# Patient Record
Sex: FEMALE | Race: WHITE | NOT HISPANIC OR LATINO | Employment: UNEMPLOYED | URBAN - METROPOLITAN AREA
[De-identification: names, ages, dates, MRNs, and addresses within clinical notes are randomized per-mention and may not be internally consistent; named-entity substitution may affect disease eponyms.]

---

## 2024-09-12 ENCOUNTER — CONSULT (OUTPATIENT)
Age: 24
End: 2024-09-12
Payer: MEDICARE

## 2024-09-12 VITALS
HEIGHT: 69 IN | WEIGHT: 228.8 LBS | BODY MASS INDEX: 33.89 KG/M2 | SYSTOLIC BLOOD PRESSURE: 136 MMHG | DIASTOLIC BLOOD PRESSURE: 84 MMHG

## 2024-09-12 DIAGNOSIS — N80.9 ENDOMETRIOSIS DETERMINED BY LAPAROSCOPY: Primary | ICD-10-CM

## 2024-09-12 DIAGNOSIS — Z30.09 STERILIZATION CONSULT: ICD-10-CM

## 2024-09-12 PROCEDURE — 99203 OFFICE O/P NEW LOW 30 MIN: CPT | Performed by: OBSTETRICS & GYNECOLOGY

## 2024-09-12 RX ORDER — NORTRIPTYLINE HYDROCHLORIDE 75 MG/1
75 CAPSULE ORAL DAILY
COMMUNITY
Start: 2024-07-31

## 2024-09-12 RX ORDER — ACETAMINOPHEN AND CODEINE PHOSPHATE 120; 12 MG/5ML; MG/5ML
0.35 SOLUTION ORAL DAILY
COMMUNITY
Start: 2024-06-05 | End: 2025-06-05

## 2024-09-12 RX ORDER — UBROGEPANT 100 MG/1
100 TABLET ORAL DAILY PRN
COMMUNITY

## 2024-09-12 RX ORDER — ONDANSETRON 4 MG/1
4 TABLET, ORALLY DISINTEGRATING ORAL
COMMUNITY

## 2024-09-13 PROBLEM — F41.9 ANXIETY: Status: ACTIVE | Noted: 2017-10-06

## 2024-09-13 PROBLEM — F42.9 OCD (OBSESSIVE COMPULSIVE DISORDER): Status: ACTIVE | Noted: 2022-10-04

## 2024-09-13 PROBLEM — G43.009 MIGRAINE WITHOUT AURA AND WITHOUT STATUS MIGRAINOSUS, NOT INTRACTABLE: Status: ACTIVE | Noted: 2020-10-28

## 2024-09-13 PROBLEM — G40.89 OTHER SEIZURES (HCC): Status: ACTIVE | Noted: 2020-10-28

## 2024-09-13 PROBLEM — F32.9 MAJOR DEPRESSIVE DISORDER: Status: ACTIVE | Noted: 2021-12-05

## 2024-09-13 PROBLEM — F43.10 POSTTRAUMATIC STRESS DISORDER: Status: ACTIVE | Noted: 2021-12-05

## 2024-09-13 PROBLEM — F95.2 GILLES DE LA TOURETTE'S SYNDROME: Status: ACTIVE | Noted: 2017-10-06

## 2024-09-13 PROBLEM — N80.9 ENDOMETRIOSIS DETERMINED BY LAPAROSCOPY: Status: ACTIVE | Noted: 2024-06-05

## 2024-09-13 PROBLEM — F44.5 PSYCHOGENIC NONEPILEPTIC SEIZURE: Status: ACTIVE | Noted: 2022-10-04
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
## 2024-09-17 ENCOUNTER — HOSPITAL ENCOUNTER (OUTPATIENT)
Dept: ULTRASOUND IMAGING | Facility: HOSPITAL | Age: 24
Discharge: HOME | End: 2024-09-17
Attending: OBSTETRICS & GYNECOLOGY
Payer: MEDICARE

## 2024-09-17 DIAGNOSIS — N80.9 ENDOMETRIOSIS DETERMINED BY LAPAROSCOPY: ICD-10-CM

## 2024-09-17 PROCEDURE — 76830 TRANSVAGINAL US NON-OB: CPT

## 2024-09-17 PROCEDURE — 76856 US EXAM PELVIC COMPLETE: CPT

## 2024-09-26 ENCOUNTER — TELEPHONE (OUTPATIENT)
Age: 24
End: 2024-09-26

## 2024-09-26 NOTE — TELEPHONE ENCOUNTER
----- Message from Pallavi Yun MD sent at 2024 11:42 AM EDT -----  Regarding: Surgery  [facility] GYN Department  Surgery Scheduling Sheet    Patient Name: Alexis Jules  : 2000    Provider: Pallavi Yun MD     Needed: no; Language: N/A    Procedure: exam under anesthesia, total laparoscopic hysterectomy, bilateral salpingectomy, and possible bilateral oophorectomy    Diagnosis: Endometriosis, desire for sterility, pelvic pain    Special Needs or Equipment: none    Anesthesia: General anesthesia    Length of stay: outpatient  Does patient have comorbid conditions that will require close perioperative monitoring prior to safe discharge: no    The patient has comorbid conditions that will require close perioperative monitoring prior to safe discharge, including N/A.   This may require acute care beyond the usual and routine recovery period. As such, inpatient admission post-operatively is expected and appropriate, and anticipated hospital length of stay will be >2 midnights.    Pre-Admission Testing Needed: no   Labs that should be ordered: cbc, type and screen, and urine pregnancy test    Order PAT that is recommended in prep for procedure?: Yes, need preop anesthesia consult due to h/o substance abuse disorder    Medical Clearance Needed: no; Provider: N/A    MA Form Signed (tubals/hysterectomy): Yes    Surgical Drink Given: no; returning for full preop physical    How many days out of work: 6 week(s)     How many days no drivin day(s)       Is pre op appt needed?  scheduled  Interval for post op appt: 2 week(s)     For Surgical Scheduler:     Surgery Scheduled On:  [place]:     Pre-op Appt:   Post op Appt:  Consult/Medical clearance appt:

## 2024-10-01 ENCOUNTER — ANNUAL EXAM (OUTPATIENT)
Age: 24
End: 2024-10-01
Payer: MEDICARE

## 2024-10-01 VITALS
HEIGHT: 69 IN | SYSTOLIC BLOOD PRESSURE: 112 MMHG | WEIGHT: 234.6 LBS | DIASTOLIC BLOOD PRESSURE: 62 MMHG | BODY MASS INDEX: 34.75 KG/M2

## 2024-10-01 DIAGNOSIS — N80.9 ENDOMETRIOSIS DETERMINED BY LAPAROSCOPY: Primary | ICD-10-CM

## 2024-10-01 DIAGNOSIS — Z30.2 REQUEST FOR STERILIZATION: ICD-10-CM

## 2024-10-01 PROCEDURE — 99213 OFFICE O/P EST LOW 20 MIN: CPT | Performed by: OBSTETRICS & GYNECOLOGY

## 2024-10-01 RX ORDER — ONDANSETRON 4 MG/1
TABLET, FILM COATED ORAL EVERY 24 HOURS
COMMUNITY

## 2024-10-02 NOTE — PROGRESS NOTES
GYN Follow Up Visit    HPI:  Patient presents with her significant other to discuss TLH/BS.  Pelvic pain continues.  No VB.    PMH, PSH, Meds, Allergies, SocHx, FamHx reviewed and no changes noted.    ROS:  pertinent findings in HPI    Vitals:    10/01/24 1535   BP: 112/62       Physical Exam  Constitutional:       Appearance: Normal appearance.   HENT:      Head: Normocephalic.   Cardiovascular:      Rate and Rhythm: Normal rate and regular rhythm.   Pulmonary:      Effort: Pulmonary effort is normal.   Abdominal:      General: There is no distension.   Musculoskeletal:         General: No swelling.   Neurological:      General: No focal deficit present.      Mental Status: She is alert and oriented to person, place, and time.   Skin:     General: Skin is warm and dry.   Psychiatric:         Mood and Affect: Mood normal.         Behavior: Behavior normal.   Vitals reviewed.      :  patient refuses exam until under anesthesia    Impression/Plan:    1. Endometriosis determined by laparoscopy  2. Request for sterilization    - patient wishes to proceed with surgery  - consents previously signed  - preop wash and drink given  - reviewed risks of bleeding, infection, trauma to surrounding organs/vessels, anesthesia, VTE  - all questions answered

## 2024-10-21 ENCOUNTER — TELEPHONE (OUTPATIENT)
Age: 24
End: 2024-10-21

## 2024-10-21 NOTE — TELEPHONE ENCOUNTER
Patient called and asked if she could schedule surgery, patient is in a lot of pain and would like to schedule as soon as she can. Please advise.

## 2024-12-10 ENCOUNTER — APPOINTMENT (OUTPATIENT)
Age: 24
End: 2024-12-10
Payer: MEDICARE

## 2024-12-10 DIAGNOSIS — R10.2 PELVIC PAIN IN FEMALE: ICD-10-CM

## 2024-12-10 DIAGNOSIS — Z30.2 ENCOUNTER FOR STERILIZATION: ICD-10-CM

## 2024-12-10 DIAGNOSIS — N80.9 ENDOMETRIOSIS: ICD-10-CM

## 2024-12-10 LAB
ABO GROUP BLD: NORMAL
BASOPHILS # BLD AUTO: 0.09 THOUSANDS/ÂΜL (ref 0–0.1)
BASOPHILS NFR BLD AUTO: 1 % (ref 0–1)
BLD GP AB SCN SERPL QL: NEGATIVE
EOSINOPHIL # BLD AUTO: 0.3 THOUSAND/ÂΜL (ref 0–0.61)
EOSINOPHIL NFR BLD AUTO: 2 % (ref 0–6)
ERYTHROCYTE [DISTWIDTH] IN BLOOD BY AUTOMATED COUNT: 13.2 % (ref 11.6–15.1)
HCT VFR BLD AUTO: 46.7 % (ref 34.8–46.1)
HGB BLD-MCNC: 15.2 G/DL (ref 11.5–15.4)
IMM GRANULOCYTES # BLD AUTO: 0.05 THOUSAND/UL (ref 0–0.2)
IMM GRANULOCYTES NFR BLD AUTO: 0 % (ref 0–2)
LYMPHOCYTES # BLD AUTO: 3.31 THOUSANDS/ÂΜL (ref 0.6–4.47)
LYMPHOCYTES NFR BLD AUTO: 26 % (ref 14–44)
MCH RBC QN AUTO: 28.3 PG (ref 26.8–34.3)
MCHC RBC AUTO-ENTMCNC: 32.5 G/DL (ref 31.4–37.4)
MCV RBC AUTO: 87 FL (ref 82–98)
MONOCYTES # BLD AUTO: 0.46 THOUSAND/ÂΜL (ref 0.17–1.22)
MONOCYTES NFR BLD AUTO: 4 % (ref 4–12)
NEUTROPHILS # BLD AUTO: 8.42 THOUSANDS/ÂΜL (ref 1.85–7.62)
NEUTS SEG NFR BLD AUTO: 67 % (ref 43–75)
NRBC BLD AUTO-RTO: 0 /100 WBCS
PLATELET # BLD AUTO: 335 THOUSANDS/UL (ref 149–390)
PMV BLD AUTO: 9.9 FL (ref 8.9–12.7)
RBC # BLD AUTO: 5.38 MILLION/UL (ref 3.81–5.12)
RH BLD: POSITIVE
SPECIMEN EXPIRATION DATE: NORMAL
WBC # BLD AUTO: 12.63 THOUSAND/UL (ref 4.31–10.16)

## 2024-12-10 PROCEDURE — 86900 BLOOD TYPING SEROLOGIC ABO: CPT

## 2024-12-10 PROCEDURE — 36415 COLL VENOUS BLD VENIPUNCTURE: CPT

## 2024-12-10 PROCEDURE — 86901 BLOOD TYPING SEROLOGIC RH(D): CPT

## 2024-12-10 PROCEDURE — 86850 RBC ANTIBODY SCREEN: CPT

## 2024-12-10 PROCEDURE — 85025 COMPLETE CBC W/AUTO DIFF WBC: CPT

## 2024-12-12 DIAGNOSIS — Z00.6 ENCOUNTER FOR EXAMINATION FOR NORMAL COMPARISON OR CONTROL IN CLINICAL RESEARCH PROGRAM: ICD-10-CM

## 2024-12-19 LAB
ABO GROUP BLD: NORMAL
BLD GP AB SCN SERPL QL: NEGATIVE
RH BLD: POSITIVE
SPECIMEN EXPIRATION DATE: NORMAL

## 2025-01-02 ENCOUNTER — ANESTHESIA EVENT (OUTPATIENT)
Dept: PERIOP | Facility: HOSPITAL | Age: 25
End: 2025-01-02
Payer: MEDICARE

## 2025-01-02 PROCEDURE — NC001 PR NO CHARGE: Performed by: OBSTETRICS & GYNECOLOGY

## 2025-01-02 NOTE — ANESTHESIA PREPROCEDURE EVALUATION
Procedure:  TOTAL LAPAROSCOPIC HYSTERECTOMY, BILATERAL SALPINGECTOMY POSSIBLE BILATERAL OOPHORECTOMY, EXAM UNDER ANESTHESIA (Abdomen)    Relevant Problems   ANESTHESIA (within normal limits)      CARDIO (within normal limits)   (+) Migraine without aura and without status migrainosus, not intractable      ENDO (within normal limits)      GI/HEPATIC   (+) Liver cirrhosis (HCC)      /RENAL (within normal limits)      GYN (within normal limits)      HEMATOLOGY (within normal limits)      MUSCULOSKELETAL (within normal limits)      NEURO/PSYCH   (+) Anxiety   (+) Major depressive disorder   (+) Migraine without aura and without status migrainosus, not intractable   (+) Other seizures (HCC)   (+) Posttraumatic stress disorder   (+) Psychogenic nonepileptic seizure      PULMONARY (within normal limits)      Behavioral Health   (+) Seymour de la Tourette's syndrome      Patient reported emergence delirium previously  Non-epileptic epilepsy, 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   Hx of benzo abuse in remission, and pt requested to stay away from benzo if possible.       Physical Exam    Airway    Mallampati score: II  TM Distance: >3 FB  Neck ROM: full     Dental        Cardiovascular  Rhythm: regular, Rate: normal, Cardiovascular exam normal    Pulmonary  Pulmonary exam normal Breath sounds clear to auscultation    Other Findings  post-pubertal.      Anesthesia Plan  ASA Score- 2     Anesthesia Type- general with ASA Monitors.         Additional Monitors:     Airway Plan: ETT.           Plan Factors-Exercise tolerance (METS): >4 METS.    Chart reviewed.  Imaging results reviewed. Existing labs reviewed. Patient summary reviewed.                  Induction- intravenous.    Postoperative Plan- Plan for postoperative opioid use. Planned trial extubation        Informed Consent- Anesthetic plan and risks discussed with patient and sibling.  I personally reviewed this patient with the CRNA. Discussed and agreed on the Anesthesia  Plan with the CRNA..
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
## 2025-01-03 ENCOUNTER — HOSPITAL ENCOUNTER (OUTPATIENT)
Facility: HOSPITAL | Age: 25
Setting detail: OUTPATIENT SURGERY
Discharge: HOME | End: 2025-01-03
Attending: OBSTETRICS & GYNECOLOGY | Admitting: OBSTETRICS & GYNECOLOGY
Payer: MEDICARE

## 2025-01-03 ENCOUNTER — ANESTHESIA (OUTPATIENT)
Dept: PERIOP | Facility: HOSPITAL | Age: 25
End: 2025-01-03
Payer: MEDICARE

## 2025-01-03 ENCOUNTER — DOCUMENTATION (OUTPATIENT)
Age: 25
End: 2025-01-03

## 2025-01-03 VITALS
TEMPERATURE: 97.7 F | OXYGEN SATURATION: 98 % | DIASTOLIC BLOOD PRESSURE: 61 MMHG | BODY MASS INDEX: 34.21 KG/M2 | RESPIRATION RATE: 17 BRPM | HEART RATE: 48 BPM | SYSTOLIC BLOOD PRESSURE: 99 MMHG | WEIGHT: 230 LBS

## 2025-01-03 DIAGNOSIS — R10.2 PELVIC PAIN IN FEMALE: ICD-10-CM

## 2025-01-03 DIAGNOSIS — G89.18 POST-OP PAIN: ICD-10-CM

## 2025-01-03 DIAGNOSIS — R11.0 POSTOPERATIVE NAUSEA: Primary | ICD-10-CM

## 2025-01-03 DIAGNOSIS — Z98.890 POSTOPERATIVE NAUSEA: Primary | ICD-10-CM

## 2025-01-03 DIAGNOSIS — Z30.2 ENCOUNTER FOR STERILIZATION: ICD-10-CM

## 2025-01-03 DIAGNOSIS — Z90.710 STATUS POST LAPAROSCOPIC HYSTERECTOMY: Primary | ICD-10-CM

## 2025-01-03 DIAGNOSIS — N80.9 ENDOMETRIOSIS: ICD-10-CM

## 2025-01-03 PROBLEM — K74.60 LIVER CIRRHOSIS (HCC): Status: ACTIVE | Noted: 2025-01-03

## 2025-01-03 LAB
EXT PREGNANCY TEST URINE: NEGATIVE
EXT. CONTROL: NORMAL

## 2025-01-03 PROCEDURE — 88307 TISSUE EXAM BY PATHOLOGIST: CPT | Performed by: SPECIALIST

## 2025-01-03 PROCEDURE — NC001 PR NO CHARGE: Performed by: OBSTETRICS & GYNECOLOGY

## 2025-01-03 PROCEDURE — 81025 URINE PREGNANCY TEST: CPT | Performed by: OBSTETRICS & GYNECOLOGY

## 2025-01-03 PROCEDURE — 58571 TLH W/T/O 250 G OR LESS: CPT | Performed by: OBSTETRICS & GYNECOLOGY

## 2025-01-03 RX ORDER — ONDANSETRON 4 MG/1
4 TABLET, ORALLY DISINTEGRATING ORAL EVERY 8 HOURS PRN
Qty: 20 TABLET | Refills: 0 | Status: SHIPPED | OUTPATIENT
Start: 2025-01-03

## 2025-01-03 RX ORDER — LIDOCAINE HYDROCHLORIDE 10 MG/ML
INJECTION, SOLUTION EPIDURAL; INFILTRATION; INTRACAUDAL; PERINEURAL AS NEEDED
Status: DISCONTINUED | OUTPATIENT
Start: 2025-01-03 | End: 2025-01-03

## 2025-01-03 RX ORDER — DEXAMETHASONE SODIUM PHOSPHATE 10 MG/ML
INJECTION, SOLUTION INTRAMUSCULAR; INTRAVENOUS AS NEEDED
Status: DISCONTINUED | OUTPATIENT
Start: 2025-01-03 | End: 2025-01-03

## 2025-01-03 RX ORDER — FENTANYL CITRATE/PF 50 MCG/ML
25 SYRINGE (ML) INJECTION
Status: DISCONTINUED | OUTPATIENT
Start: 2025-01-03 | End: 2025-01-03 | Stop reason: HOSPADM

## 2025-01-03 RX ORDER — CEFAZOLIN SODIUM 2 G/50ML
2000 SOLUTION INTRAVENOUS ONCE
Status: COMPLETED | OUTPATIENT
Start: 2025-01-03 | End: 2025-01-03

## 2025-01-03 RX ORDER — GLYCOPYRROLATE 0.2 MG/ML
INJECTION INTRAMUSCULAR; INTRAVENOUS AS NEEDED
Status: DISCONTINUED | OUTPATIENT
Start: 2025-01-03 | End: 2025-01-03

## 2025-01-03 RX ORDER — ONDANSETRON 2 MG/ML
4 INJECTION INTRAMUSCULAR; INTRAVENOUS EVERY 6 HOURS PRN
Status: DISCONTINUED | OUTPATIENT
Start: 2025-01-03 | End: 2025-01-03 | Stop reason: HOSPADM

## 2025-01-03 RX ORDER — FENTANYL CITRATE 50 UG/ML
INJECTION, SOLUTION INTRAMUSCULAR; INTRAVENOUS AS NEEDED
Status: DISCONTINUED | OUTPATIENT
Start: 2025-01-03 | End: 2025-01-03

## 2025-01-03 RX ORDER — KETAMINE HCL IN NACL, ISO-OSM 100MG/10ML
SYRINGE (ML) INJECTION AS NEEDED
Status: DISCONTINUED | OUTPATIENT
Start: 2025-01-03 | End: 2025-01-03

## 2025-01-03 RX ORDER — ACETAMINOPHEN 325 MG/1
975 TABLET ORAL EVERY 6 HOURS PRN
Status: DISCONTINUED | OUTPATIENT
Start: 2025-01-03 | End: 2025-01-03 | Stop reason: HOSPADM

## 2025-01-03 RX ORDER — LIDOCAINE HYDROCHLORIDE 10 MG/ML
0.5 INJECTION, SOLUTION EPIDURAL; INFILTRATION; INTRACAUDAL; PERINEURAL ONCE
Status: COMPLETED | OUTPATIENT
Start: 2025-01-03 | End: 2025-01-03

## 2025-01-03 RX ORDER — TRAMADOL HYDROCHLORIDE 50 MG/1
50 TABLET ORAL EVERY 6 HOURS PRN
Qty: 20 TABLET | Refills: 0 | Status: SHIPPED | OUTPATIENT
Start: 2025-01-03 | End: 2025-01-13

## 2025-01-03 RX ORDER — ONDANSETRON 2 MG/ML
4 INJECTION INTRAMUSCULAR; INTRAVENOUS ONCE AS NEEDED
Status: COMPLETED | OUTPATIENT
Start: 2025-01-03 | End: 2025-01-03

## 2025-01-03 RX ORDER — ONDANSETRON 2 MG/ML
INJECTION INTRAMUSCULAR; INTRAVENOUS AS NEEDED
Status: DISCONTINUED | OUTPATIENT
Start: 2025-01-03 | End: 2025-01-03

## 2025-01-03 RX ORDER — BUPIVACAINE HYDROCHLORIDE 5 MG/ML
INJECTION, SOLUTION EPIDURAL; INTRACAUDAL AS NEEDED
Status: DISCONTINUED | OUTPATIENT
Start: 2025-01-03 | End: 2025-01-03 | Stop reason: HOSPADM

## 2025-01-03 RX ORDER — OXYCODONE HYDROCHLORIDE 5 MG/1
5 TABLET ORAL EVERY 4 HOURS PRN
Status: DISCONTINUED | OUTPATIENT
Start: 2025-01-03 | End: 2025-01-03 | Stop reason: HOSPADM

## 2025-01-03 RX ORDER — SENNOSIDES 8.6 MG
650 CAPSULE ORAL EVERY 8 HOURS PRN
Qty: 60 TABLET | Refills: 0 | Status: SHIPPED | OUTPATIENT
Start: 2025-01-03

## 2025-01-03 RX ORDER — KETOROLAC TROMETHAMINE 30 MG/ML
15 INJECTION, SOLUTION INTRAMUSCULAR; INTRAVENOUS ONCE
Status: COMPLETED | OUTPATIENT
Start: 2025-01-03 | End: 2025-01-03

## 2025-01-03 RX ORDER — PHENAZOPYRIDINE HYDROCHLORIDE 100 MG/1
200 TABLET, FILM COATED ORAL ONCE
Status: COMPLETED | OUTPATIENT
Start: 2025-01-03 | End: 2025-01-03

## 2025-01-03 RX ORDER — PROPOFOL 10 MG/ML
INJECTION, EMULSION INTRAVENOUS AS NEEDED
Status: DISCONTINUED | OUTPATIENT
Start: 2025-01-03 | End: 2025-01-03

## 2025-01-03 RX ORDER — HYDROMORPHONE HCL/PF 1 MG/ML
0.5 SYRINGE (ML) INJECTION
Status: DISCONTINUED | OUTPATIENT
Start: 2025-01-03 | End: 2025-01-03 | Stop reason: HOSPADM

## 2025-01-03 RX ORDER — PROPOFOL 10 MG/ML
INJECTION, EMULSION INTRAVENOUS CONTINUOUS PRN
Status: DISCONTINUED | OUTPATIENT
Start: 2025-01-03 | End: 2025-01-03

## 2025-01-03 RX ORDER — IBUPROFEN 600 MG/1
600 TABLET, FILM COATED ORAL EVERY 6 HOURS PRN
Qty: 60 TABLET | Refills: 0 | Status: SHIPPED | OUTPATIENT
Start: 2025-01-03

## 2025-01-03 RX ORDER — ROCURONIUM BROMIDE 10 MG/ML
INJECTION, SOLUTION INTRAVENOUS AS NEEDED
Status: DISCONTINUED | OUTPATIENT
Start: 2025-01-03 | End: 2025-01-03

## 2025-01-03 RX ORDER — HYDROMORPHONE HCL/PF 1 MG/ML
SYRINGE (ML) INJECTION AS NEEDED
Status: DISCONTINUED | OUTPATIENT
Start: 2025-01-03 | End: 2025-01-03

## 2025-01-03 RX ORDER — METRONIDAZOLE 500 MG/100ML
500 INJECTION, SOLUTION INTRAVENOUS ONCE
Status: COMPLETED | OUTPATIENT
Start: 2025-01-03 | End: 2025-01-03

## 2025-01-03 RX ORDER — SODIUM CHLORIDE, SODIUM LACTATE, POTASSIUM CHLORIDE, CALCIUM CHLORIDE 600; 310; 30; 20 MG/100ML; MG/100ML; MG/100ML; MG/100ML
INJECTION, SOLUTION INTRAVENOUS CONTINUOUS PRN
Status: DISCONTINUED | OUTPATIENT
Start: 2025-01-03 | End: 2025-01-03

## 2025-01-03 RX ORDER — KETOROLAC TROMETHAMINE 30 MG/ML
INJECTION, SOLUTION INTRAMUSCULAR; INTRAVENOUS AS NEEDED
Status: DISCONTINUED | OUTPATIENT
Start: 2025-01-03 | End: 2025-01-03

## 2025-01-03 RX ORDER — SCOLOPAMINE TRANSDERMAL SYSTEM 1 MG/1
1 PATCH, EXTENDED RELEASE TRANSDERMAL ONCE
Status: DISCONTINUED | OUTPATIENT
Start: 2025-01-03 | End: 2025-01-03

## 2025-01-03 RX ORDER — SODIUM CHLORIDE, SODIUM LACTATE, POTASSIUM CHLORIDE, CALCIUM CHLORIDE 600; 310; 30; 20 MG/100ML; MG/100ML; MG/100ML; MG/100ML
125 INJECTION, SOLUTION INTRAVENOUS CONTINUOUS
Status: DISCONTINUED | OUTPATIENT
Start: 2025-01-03 | End: 2025-01-03

## 2025-01-03 RX ORDER — IBUPROFEN 600 MG/1
600 TABLET, FILM COATED ORAL EVERY 6 HOURS PRN
Status: DISCONTINUED | OUTPATIENT
Start: 2025-01-03 | End: 2025-01-03 | Stop reason: HOSPADM

## 2025-01-03 RX ADMIN — DEXMEDETOMIDINE HYDROCHLORIDE 20 MCG: 100 INJECTION, SOLUTION INTRAVENOUS at 14:16

## 2025-01-03 RX ADMIN — CEFAZOLIN SODIUM 2000 MG: 2 SOLUTION INTRAVENOUS at 12:39

## 2025-01-03 RX ADMIN — DEXMEDETOMIDINE HYDROCHLORIDE 4 MCG: 100 INJECTION, SOLUTION INTRAVENOUS at 12:33

## 2025-01-03 RX ADMIN — FENTANYL CITRATE 25 MCG: 50 INJECTION INTRAMUSCULAR; INTRAVENOUS at 15:10

## 2025-01-03 RX ADMIN — ONDANSETRON 4 MG: 2 INJECTION INTRAMUSCULAR; INTRAVENOUS at 12:22

## 2025-01-03 RX ADMIN — Medication 50 MG: at 12:33

## 2025-01-03 RX ADMIN — PROPOFOL 200 MG: 10 INJECTION, EMULSION INTRAVENOUS at 12:33

## 2025-01-03 RX ADMIN — SUGAMMADEX 200 MG: 100 INJECTION, SOLUTION INTRAVENOUS at 14:10

## 2025-01-03 RX ADMIN — KETOROLAC TROMETHAMINE 15 MG: 30 INJECTION, SOLUTION INTRAMUSCULAR; INTRAVENOUS at 15:16

## 2025-01-03 RX ADMIN — DEXMEDETOMIDINE HYDROCHLORIDE 8 MCG: 100 INJECTION, SOLUTION INTRAVENOUS at 12:16

## 2025-01-03 RX ADMIN — DEXMEDETOMIDINE HYDROCHLORIDE 4 MCG: 100 INJECTION, SOLUTION INTRAVENOUS at 12:26

## 2025-01-03 RX ADMIN — METRONIDAZOLE: 500 SOLUTION INTRAVENOUS at 12:44

## 2025-01-03 RX ADMIN — PROPOFOL 90 MCG/KG/MIN: 10 INJECTION, EMULSION INTRAVENOUS at 13:15

## 2025-01-03 RX ADMIN — HYDROMORPHONE HYDROCHLORIDE 0.5 MG: 1 INJECTION, SOLUTION INTRAMUSCULAR; INTRAVENOUS; SUBCUTANEOUS at 14:38

## 2025-01-03 RX ADMIN — HYDROMORPHONE HYDROCHLORIDE 0.5 MG: 1 INJECTION, SOLUTION INTRAMUSCULAR; INTRAVENOUS; SUBCUTANEOUS at 14:55

## 2025-01-03 RX ADMIN — LIDOCAINE HYDROCHLORIDE 0.5 ML: 10 INJECTION, SOLUTION EPIDURAL; INFILTRATION; INTRACAUDAL; PERINEURAL at 11:50

## 2025-01-03 RX ADMIN — SODIUM CHLORIDE, SODIUM LACTATE, POTASSIUM CHLORIDE, AND CALCIUM CHLORIDE: .6; .31; .03; .02 INJECTION, SOLUTION INTRAVENOUS at 11:55

## 2025-01-03 RX ADMIN — DEXAMETHASONE SODIUM PHOSPHATE 10 MG: 10 INJECTION, SOLUTION INTRAMUSCULAR; INTRAVENOUS at 13:17

## 2025-01-03 RX ADMIN — SODIUM CHLORIDE, SODIUM LACTATE, POTASSIUM CHLORIDE, AND CALCIUM CHLORIDE 125 ML/HR: .6; .31; .03; .02 INJECTION, SOLUTION INTRAVENOUS at 11:49

## 2025-01-03 RX ADMIN — SCOPALAMINE 1 PATCH: 1 PATCH, EXTENDED RELEASE TRANSDERMAL at 11:47

## 2025-01-03 RX ADMIN — FENTANYL CITRATE 50 MCG: 50 INJECTION INTRAMUSCULAR; INTRAVENOUS at 14:25

## 2025-01-03 RX ADMIN — DEXMEDETOMIDINE HYDROCHLORIDE 4 MCG: 100 INJECTION, SOLUTION INTRAVENOUS at 12:29

## 2025-01-03 RX ADMIN — PHENAZOPYRIDINE HYDROCHLORIDE 200 MG: 100 TABLET ORAL at 11:47

## 2025-01-03 RX ADMIN — DEXMEDETOMIDINE HYDROCHLORIDE 8 MCG: 100 INJECTION, SOLUTION INTRAVENOUS at 12:19

## 2025-01-03 RX ADMIN — ROCURONIUM BROMIDE 60 MG: 10 INJECTION, SOLUTION INTRAVENOUS at 12:33

## 2025-01-03 RX ADMIN — NOREPINEPHRINE BITARTRATE 5 MCG/MIN: 1 INJECTION, SOLUTION, CONCENTRATE INTRAVENOUS at 12:47

## 2025-01-03 RX ADMIN — DEXMEDETOMIDINE HYDROCHLORIDE 8 MCG: 100 INJECTION, SOLUTION INTRAVENOUS at 12:22

## 2025-01-03 RX ADMIN — KETOROLAC TROMETHAMINE 15 MG: 30 INJECTION, SOLUTION INTRAMUSCULAR; INTRAVENOUS at 14:35

## 2025-01-03 RX ADMIN — FENTANYL CITRATE 100 MCG: 50 INJECTION INTRAMUSCULAR; INTRAVENOUS at 12:33

## 2025-01-03 RX ADMIN — GLYCOPYRROLATE 0.1 MG: 0.2 INJECTION, SOLUTION INTRAMUSCULAR; INTRAVENOUS at 12:22

## 2025-01-03 RX ADMIN — ACETAMINOPHEN 975 MG: 325 TABLET, FILM COATED ORAL at 16:29

## 2025-01-03 RX ADMIN — LIDOCAINE HYDROCHLORIDE 50 MG: 10 INJECTION, SOLUTION EPIDURAL; INFILTRATION; INTRACAUDAL; PERINEURAL at 12:33

## 2025-01-03 RX ADMIN — FENTANYL CITRATE 50 MCG: 50 INJECTION INTRAMUSCULAR; INTRAVENOUS at 14:18

## 2025-01-03 RX ADMIN — SODIUM CHLORIDE, SODIUM LACTATE, POTASSIUM CHLORIDE, AND CALCIUM CHLORIDE: .6; .31; .03; .02 INJECTION, SOLUTION INTRAVENOUS at 12:52

## 2025-01-03 RX ADMIN — ONDANSETRON 4 MG: 2 INJECTION INTRAMUSCULAR; INTRAVENOUS at 14:30

## 2025-01-03 RX ADMIN — DEXMEDETOMIDINE HYDROCHLORIDE 4 MCG: 100 INJECTION, SOLUTION INTRAVENOUS at 12:31

## 2025-01-03 NOTE — ANESTHESIA POSTPROCEDURE EVALUATION
Post-Op Assessment Note    CV Status:  Stable    Pain management: adequate       Mental Status:  Alert and awake   Hydration Status:  Euvolemic   PONV Controlled:  Controlled   Airway Patency:  Patent     Post Op Vitals Reviewed: Yes    No anethesia notable event occurred.    Staff: Anesthesiologist, CRNA             Last Filed PACU Vitals:  Vitals Value Taken Time   Temp 97.4 °F (36.3 °C) 01/03/25 1530   Pulse 79 01/03/25 1537   /60 01/03/25 1530   Resp 37 01/03/25 1537   SpO2 96 % 01/03/25 1536   Vitals shown include unfiled device data.    Modified Tay:     Vitals Value Taken Time   Activity 2 01/03/25 1530   Respiration 2 01/03/25 1530   Circulation 2 01/03/25 1530   Consciousness 2 01/03/25 1530   Oxygen Saturation 2 01/03/25 1530     Modified Tay Score: 10      Patient was constantly moving right arm after waking up. Right 18 G IV infiltration noted in PACU and was immediately removed and replaced with new IV (LFA 22G). Right arm elevated and warm compress applied.   Of note, the 18G IV was working well during the case for induction and maintenance.     After 30 mins, IV infiltration site (Right Forearm 18G) significantly improved, redness resolved.

## 2025-01-03 NOTE — ANESTHESIA POSTPROCEDURE EVALUATION
Post-Op Assessment Note    CV Status:  Stable  Pain Score: 5    Pain management: adequate       Mental Status:  Awake (Crying/ emotional.)   Hydration Status:  Stable   PONV Controlled:  None   Airway Patency:  Patent     Post Op Vitals Reviewed: Yes    No anethesia notable event occurred.    Staff: CRNA, Anesthesiologist       Last Filed PACU Vitals:  Vitals Value Taken Time   Temp 97.3 °F (36.3 °C) 01/03/25 1429   Pulse 74 01/03/25 1440   /54 01/03/25 1437   Resp 26 01/03/25 1440   SpO2 74 % 01/03/25 1440   Vitals shown include unfiled device data.    Modified Tay:     Vitals Value Taken Time   Activity 2 01/03/25 1425   Respiration 2 01/03/25 1425   Circulation 2 01/03/25 1425   Consciousness 2 01/03/25 1425   Oxygen Saturation 2 01/03/25 1425     Modified Tya Score: 10

## 2025-01-03 NOTE — OP NOTE
OPERATIVE REPORT  PATIENT NAME: Alexis Jules    :  2000  MRN: [id number]  Pt Location: [facility] OR ROOM 07    SURGERY DATE: 1/3/2025    Surgeons and Role:     * Pallavi Yun MD - Primary     * Javi Castaneda MD - Assisting     * Lashawn Ramos MD - Assisting     * Twila Minaya MD - Assisting    Preop Diagnosis:  Endometriosis [N80.9]  Pelvic pain in female [R10.2]  Encounter for sterilization [Z30.2]    Post-Op Diagnosis Codes:     * Endometriosis [N80.9]     * Pelvic pain in female [R10.2]     * Encounter for sterilization [Z30.2]    Procedure(s):  TOTAL LAPAROSCOPIC HYSTERECTOMY. BILATERAL SALPINGECTOMY POSSIBLE BILATERAL OOPHORECTOMY. EXAM UNDER ANESTHESIA    Specimen(s):  ID Type Source Tests Collected by Time Destination   1 : with cervix and bilateral fallopian tubes Tissue Uterus TISSUE EXAM Pallavi Yun MD 1/3/2025 1335        Estimated Blood Loss:   50 mL    Operative Findings:  Grossly normal appearing external female genitalia, vaginal mucosa and cervix  Small, nulliparous, mobile uterus which sounded to 6 cm  On laparoscopic evaluation, normal abdominal survey without evidence of injury or gross pathology   Grossly normal uterus, bilateral fallopian tubes and ovaries  Grossly normal appendix  Ureteral vermiculation appreciated bilaterally  On cystoscopic evaluation, normal appearing, intact bladder lumen without evidence of injury or foreign body  Bilateral ureteral efflux observed      DESCRIPTION OF PROCEDURE: The patient was taken to the operating room where she was properly identified and general anesthesia was obtained without difficulty. The patient was given prophylactic intravenous Ancef and Flagyl.      The patient was prepped and draped in the usual sterile manner in the dorsal lithotomy position using the stirrups. Care was taken to avoid excessive flexion or extension of the patient's hips and knees and pressure on her extremities. A time out was  performed and the above information confirmed.    A zhou catheter was inserted into the bladder and a weighted speculum was placed into the vagina. A miki was placed into the vagina, and the anterior portion of the cervix was grasped with a single tooth tenaculum.  The uterus sounded to 6 cm. A suture was placed through the anterior lip of the cervix which was then dilated for insertion of a Brandy manipulator.  The single tooth tenaculum and the weighted speculum were then removed from the vagina.  The Brandy tip and vaginal cuff occluder were inflated.      Surgeons gloves were then changed, and attention was then turned to the abdomen. Local was infiltrated below the umbilicus and then a 10 mm incision was made below the umbilicus. Under direct visualization, a 10mm trocar was inserted through the incision into the peritoneum without difficulty. Laparoscopic visualization confirmed the intraperitoneal insertion of the port.  Pneumoperitoneum was established and maintained at 15 mmHg using carbon dioxide.      Subsequently, after infiltrating the areas with local, two additional small incisions were made in both the right and left lower quadrants, approximately 3 cm above and 3 cm medial to the anterior superior iliac spine. Two ports (5mm, 5mm) were introduced under direct visualization.      The patient was placed in trendelenburg, and attention was then turned to the pelvis. The procedure was completed bilaterally unless otherwise stated. The fimbriae of the fallopian tube were grasped and the Enseal dissecting instrument was then used to coagulate and cut along the mesosalpinx. The ureter was identified to be well out of the surgical field. The fallopian tube was then amputated 1 cm from the cornua with the Enseal and removed through the lateral trocar. We continued using the Enseal to coagulate and cut the utero-ovarian ligament with subsequent coagulation and cutting of the round ligament. The anterior and  posterior leaves of the broad ligament were then dissected and taken down to the level of the uterine arteries. A bladder flap was then created and dissected approximately half of the way across the uterus at the level of the lower uterine segment and completed from the contralateral side. The bladder was pushed down below the level of the Koh ring. The Enseal was then used to coagulate and transect both uterine arteries.    Bilateral cardinal ligaments were then cauterized and cut used the Enseal dissecting instrument.  At this point, the uterus was visualized and noted to be blanched.  The colpotomy was performed with the monopolar spatula.      Once the colpotomy was completed, the vaginal cuff occluder was deflated and the uterus and cervix were removed vaginally. The entire cuff was then visualized with a weighted speculum and miki retractor. The lateral aspects were grasped with Allis clamps and figure of eight stitches were placed on the corners bilaterally using 0-Vicryl. The remainder of the cuff was reapproximated in a running locked fashion with 0-Vicryl. Excellent hemostasis was obtained.    Attention was then turned back to the abdomen where sterile gloves were exchanged and pneumoperitoneum was reestablished. The abdomen and pelvis were visualized, irrigated, and good hemostasis of the vaginal cuff was noted. Pneumoperitoneum was then evacuated and the trocars were removed. The trocar incisions were closed using 4-0 Monocryl. The zhou was removed and cystoscopy was performed.  Bilateral ureteral jets were visualized, as was the bladder dome.  No damage to the bladder was visualized on cystoscopy. The bladder was drained and the cystoscope was removed. All instruments were then removed from the vagina.    Dr. Yun was present and participated in all key portions of the case. The patient tolerated the procedure well and was transferred to the recovery room in stable condition.  All needle,  sponge, and instrument counts were noted to be correct x 2 at the end of the procedure.            SIGNATURE: Lashawn Ramos MD  DATE: January 3, 2025  TIME: 2:08 PM

## 2025-01-03 NOTE — PROGRESS NOTES
Pt. To PACU.Report given. Course uneventful. VSS. Pt. Emotional and c/o pain. Airway patent. DAVID's x4.

## 2025-01-03 NOTE — H&P
H&P reviewed. After examining the patient I find no changes in the patients condition since the H&P had been written.    Vitals:    01/03/25 0945   BP: 143/94   Pulse: (!) 118   Temp: (!) 97.3 °F (36.3 °C)   SpO2: 97%

## 2025-01-07 ENCOUNTER — RESULTS FOLLOW-UP (OUTPATIENT)
Age: 25
End: 2025-01-07

## 2025-01-07 PROCEDURE — 88307 TISSUE EXAM BY PATHOLOGIST: CPT | Performed by: SPECIALIST

## 2025-01-21 ENCOUNTER — OFFICE VISIT (OUTPATIENT)
Age: 25
End: 2025-01-21

## 2025-01-21 VITALS
DIASTOLIC BLOOD PRESSURE: 66 MMHG | HEIGHT: 69 IN | SYSTOLIC BLOOD PRESSURE: 122 MMHG | BODY MASS INDEX: 34.9 KG/M2 | WEIGHT: 235.6 LBS

## 2025-01-21 DIAGNOSIS — Z09 POSTOPERATIVE EXAMINATION: Primary | ICD-10-CM

## 2025-01-21 PROCEDURE — 99024 POSTOP FOLLOW-UP VISIT: CPT | Performed by: OBSTETRICS & GYNECOLOGY

## 2025-01-22 NOTE — PROGRESS NOTES
"Assessment:    S/p TLH/BS/cysto  Doing well postoperatively  Operative findings again reviewed  Pathology report discussed.      Plan:    1. Continue any current medications.  2. Wound care discussed.  3. Activity restrictions: no lifting more than 25 pounds and nothing per vagina  4. Anticipated return to work: not applicable.  5. Follow up: 4 weeks for vaginal cuff recheck.       Subjective     Alexis Jules is a 24 y.o. female who presents to the clinic 2 weeks status post  EUA, TLH, BS, cysto  for pelvic pain. Eating a regular diet without difficulty. Bowel movements are  still a bit painful . Pain is controlled without any medications.    The following portions of the patient's history were reviewed and updated as appropriate: allergies, current medications, past family history, past medical history, past social history, past surgical history, and problem list.    Review of Systems  Pertinent items are noted in HPI.      Objective     /66 (BP Location: Left arm, Patient Position: Standing, Cuff Size: Large)   Ht 5' 8.75\" (1.746 m)   Wt 107 kg (235 lb 9.6 oz)   BMI 35.05 kg/m²   General:  alert and oriented, in no acute distress   Abdomen: soft, non-tender   Incision:   healing well, no drainage, no erythema, no hernia, no seroma, no swelling, no dehiscence, incision well approximated           "

## 2025-02-07 ENCOUNTER — TELEPHONE (OUTPATIENT)
Age: 25
End: 2025-02-07

## 2025-02-07 NOTE — TELEPHONE ENCOUNTER
Called patient twice and I couldn't hear anyone on the other end. [employer] message sent regarding Tues appt. 2/11/25 needs to be R/S.

## 2025-02-17 ENCOUNTER — OFFICE VISIT (OUTPATIENT)
Age: 25
End: 2025-02-17

## 2025-02-17 VITALS — WEIGHT: 237.4 LBS | SYSTOLIC BLOOD PRESSURE: 118 MMHG | DIASTOLIC BLOOD PRESSURE: 72 MMHG | BODY MASS INDEX: 35.31 KG/M2

## 2025-02-17 DIAGNOSIS — Z09 POSTOP CHECK: Primary | ICD-10-CM

## 2025-02-17 PROCEDURE — 99024 POSTOP FOLLOW-UP VISIT: CPT | Performed by: OBSTETRICS & GYNECOLOGY

## 2025-02-18 NOTE — PROGRESS NOTES
Assessment:    S/p TLH/BS  Doing well postoperatively.       Plan:    1. Continue any current medications.  2. Activity restrictions: none  4. Anticipated return to work: now.  5. Follow up: 1  year  for annual exam.       Subjective     Alexis Jules is a 24 y.o. female who presents to the clinic 6 weeks status post  TLH/BS  for abnormal uterine bleeding, pelvic pain, and requested sterilization. Eating a regular diet without difficulty. Bowel movements are  improving; some pain continues . Pain is controlled without any medications.    The following portions of the patient's history were reviewed and updated as appropriate: allergies, current medications, past family history, past medical history, past social history, past surgical history, and problem list.    Review of Systems  Pertinent items are noted in HPI.      Objective     /72 (BP Location: Right arm, Patient Position: Sitting, Cuff Size: Large)   Wt 108 kg (237 lb 6.4 oz)   BMI 35.31 kg/m²   General:  alert and oriented, in no acute distress   Abdomen: soft, non-tender   Vagina:   Cuff intact, no bleeding

## 2025-02-19 ENCOUNTER — TELEPHONE (OUTPATIENT)
Age: 25
End: 2025-02-19

## 2025-02-19 NOTE — TELEPHONE ENCOUNTER
I was doing pre-registration and the patient's [facility] had another office as her primary care. I asked the patient to please give them a call and change the Primary care to Nah[facility]EMPH[facility]. I told the patient if she has any issue to give me a call and I will contact them for her.

## 2025-02-25 ENCOUNTER — OFFICE VISIT (OUTPATIENT)
Age: 25
End: 2025-02-25
Payer: MEDICARE

## 2025-02-25 VITALS
BODY MASS INDEX: 35.58 KG/M2 | SYSTOLIC BLOOD PRESSURE: 117 MMHG | WEIGHT: 239.2 LBS | HEART RATE: 107 BPM | DIASTOLIC BLOOD PRESSURE: 98 MMHG | OXYGEN SATURATION: 98 %

## 2025-02-25 DIAGNOSIS — Z13.1 SCREENING FOR DIABETES MELLITUS: ICD-10-CM

## 2025-02-25 DIAGNOSIS — G90.A POTS (POSTURAL ORTHOSTATIC TACHYCARDIA SYNDROME): ICD-10-CM

## 2025-02-25 DIAGNOSIS — R79.89 ELEVATED LFTS: ICD-10-CM

## 2025-02-25 DIAGNOSIS — Z13.29 SCREENING FOR THYROID DISORDER: ICD-10-CM

## 2025-02-25 DIAGNOSIS — F41.9 ANXIETY: ICD-10-CM

## 2025-02-25 DIAGNOSIS — Z76.89 ENCOUNTER TO ESTABLISH CARE: Primary | ICD-10-CM

## 2025-02-25 DIAGNOSIS — F43.10 POSTTRAUMATIC STRESS DISORDER: ICD-10-CM

## 2025-02-25 DIAGNOSIS — N80.9 ENDOMETRIOSIS DETERMINED BY LAPAROSCOPY: ICD-10-CM

## 2025-02-25 DIAGNOSIS — F42.9 OBSESSIVE-COMPULSIVE DISORDER, UNSPECIFIED TYPE: ICD-10-CM

## 2025-02-25 DIAGNOSIS — F44.5 PSYCHOGENIC NONEPILEPTIC SEIZURE: ICD-10-CM

## 2025-02-25 DIAGNOSIS — D22.9 ATYPICAL MOLE: ICD-10-CM

## 2025-02-25 DIAGNOSIS — F32.4 MAJOR DEPRESSIVE DISORDER IN PARTIAL REMISSION, UNSPECIFIED WHETHER RECURRENT (HCC): ICD-10-CM

## 2025-02-25 DIAGNOSIS — F95.2 GILLES DE LA TOURETTE'S SYNDROME: ICD-10-CM

## 2025-02-25 DIAGNOSIS — G43.009 MIGRAINE WITHOUT AURA AND WITHOUT STATUS MIGRAINOSUS, NOT INTRACTABLE: ICD-10-CM

## 2025-02-25 DIAGNOSIS — Z13.0 SCREENING FOR DEFICIENCY ANEMIA: ICD-10-CM

## 2025-02-25 DIAGNOSIS — Z13.220 SCREENING FOR LIPID DISORDERS: ICD-10-CM

## 2025-02-25 DIAGNOSIS — R19.4 CHANGE IN BOWEL HABIT: ICD-10-CM

## 2025-02-25 PROCEDURE — 99204 OFFICE O/P NEW MOD 45 MIN: CPT

## 2025-02-25 PROCEDURE — 93000 ELECTROCARDIOGRAM COMPLETE: CPT

## 2025-02-25 NOTE — ASSESSMENT & PLAN NOTE
Pt recently went through hysterotomy; denies any abdominal pain or concerns    Follows with GYN

## 2025-02-25 NOTE — ASSESSMENT & PLAN NOTE
Pt reports that she was follows with a cardiologist for POTS symptoms but would like to establish was [facility]     ECG-showed NSR;   Orders:    Ambulatory Referral to Cardiology; Future

## 2025-02-25 NOTE — PROGRESS NOTES
Name: Alexis Jules      : 2000      MRN: [id number]  Encounter Provider: Nahomi Hair  Encounter Date: 2025   Encounter department: [facility]  :  Assessment & Plan  Encounter to establish care  Pt seen in the office today to establish care; pt reports that she was seeing a neurologist for migraines and seizures and would like to establish with  neurology; pt also reports that she was following with cardiology for POTS symptoms- referral placed     UTD with vision exams  Patient not UTD with dental exams- education on importance of regular dental visits;     Declines vaccinations   F/u in 3 months for recheck and annual physical   Orders:    Comprehensive metabolic panel; Future    Migraine without aura and without status migrainosus, not intractable  Stable- referral placed for neurology     Psychogenic nonepileptic seizure  Stable- referral placed for neurology   Orders:    Ambulatory Referral to Neurology; Future    Seymour de la Tourette's syndrome  Stable     Hepatic cirrhosis, unspecified hepatic cirrhosis type, unspecified whether ascites present (HCC)  Reports abnormal liver function tests in the past- repeat labs ordered- denies any s/s   Orders:    Hepatic function panel; Future    Ambulatory Referral to Gastroenterology; Future    Anxiety  Pt reports symptoms are well controlled; follows with psych services     Major depressive disorder in partial remission, unspecified whether recurrent (HCC)  Depression Screening Follow-up Plan: Patient's depression screening was positive with a PHQ-9 score of 6. Patient with underlying depression and was advised to continue current medications as prescribed.    Pt reports symptoms are well controlled; follows with psych services     PHQ-2/9 Depression Screening    Little interest or pleasure in doing things: 0 - not at all  Feeling down, depressed, or hopeless: 1 - several days  Trouble falling or staying asleep, or  sleeping too much: 1 - several days  Feeling tired or having little energy: 1 - several days  Poor appetite or overeatin - more than half the days  Feeling bad about yourself - or that you are a failure or have let yourself or your family down: 1 - several days  Trouble concentrating on things, such as reading the newspaper or watching television: 0 - not at all  Moving or speaking so slowly that other people could have noticed. Or the opposite - being so fidgety or restless that you have been moving around a lot more than usual: 0 - not at all  Thoughts that you would be better off dead, or of hurting yourself in some way: 0 - not at all  PHQ-9 Score: 6  PHQ-9 Interpretation: Mild depression         Obsessive-compulsive disorder, unspecified type    Pt reports symptoms are well controlled; follows with psych services     Posttraumatic stress disorder    Pt reports symptoms are well controlled; follows with psych services     Endometriosis determined by laparoscopy  Pt recently went through hysterotomy; denies any abdominal pain or concerns    Follows with GYN     Atypical mole  Atypical moles noted on right side of abdomen; mole noted to dark brown with abnormal boarders   Orders:    Ambulatory Referral to Dermatology; Future    Change in bowel habit  Pt reports on and off changes in bowel habits; pt denies blood in stool;   Orders:    Ambulatory Referral to Gastroenterology; Future    POTS (postural orthostatic tachycardia syndrome)  Pt reports that she was follows with a cardiologist for POTS symptoms but would like to establish was [facility]     ECG-showed NSR;   Orders:    Ambulatory Referral to Cardiology; Future    Screening for deficiency anemia    Orders:    CBC and differential; Future    Screening for thyroid disorder    Orders:    TSH, 3rd generation with Free T4 reflex; Future    Screening for diabetes mellitus    Orders:    Hemoglobin A1C; Future    Screening for lipid disorders    Orders:     Lipid panel; Future          Depression Screening and Follow-up Plan:   Patient with underlying depression and was advised to continue current medications as prescribed.       History of Present Illness   HPI  Alexis Jules is a 24 year old female  seen in the office today to establish care; pt reports that she was seeing a neurologist for migraines and seizures and would like to establish with  neurology; pt also reports that she was following with cardiology for POTS symptoms- referral placed     UTD with vision exams  Patient not UTD with dental exams- education on importance of regular dental visits;     Declines vaccinations   F/u in 3 months for recheck and annual physical   Routine labs ordered     Review of Systems   Constitutional:  Negative for chills and fever.   HENT:  Negative for ear pain and sore throat.    Eyes:  Negative for pain and visual disturbance.   Respiratory:  Negative for cough and shortness of breath.    Cardiovascular:  Negative for chest pain and palpitations.   Gastrointestinal:  Negative for abdominal pain and vomiting.   Genitourinary:  Negative for dysuria and hematuria.   Musculoskeletal:  Negative for arthralgias and back pain.   Skin:  Negative for color change and rash.   Neurological:  Negative for dizziness, syncope and headaches.   All other systems reviewed and are negative.      Objective   /90 (BP Location: Right arm, Patient Position: Sitting, Cuff Size: Large)   Pulse (!) 107   Wt 109 kg (239 lb 3.2 oz)   SpO2 98%   BMI 35.58 kg/m²      Physical Exam  Vitals and nursing note reviewed.   Constitutional:       General: She is not in acute distress.     Appearance: She is well-developed.   HENT:      Head: Normocephalic and atraumatic.   Eyes:      Conjunctiva/sclera: Conjunctivae normal.   Cardiovascular:      Rate and Rhythm: Normal rate and regular rhythm.      Pulses: Normal pulses.      Heart sounds: Normal heart sounds. No murmur heard.  Pulmonary:       Effort: Pulmonary effort is normal. No respiratory distress.      Breath sounds: Normal breath sounds.   Musculoskeletal:         General: No swelling.      Cervical back: Neck supple.   Skin:     General: Skin is warm and dry.      Capillary Refill: Capillary refill takes less than 2 seconds.   Neurological:      Mental Status: She is alert.   Psychiatric:         Mood and Affect: Mood normal.

## 2025-02-25 NOTE — ASSESSMENT & PLAN NOTE
Depression Screening Follow-up Plan: Patient's depression screening was positive with a PHQ-9 score of 6. Patient with underlying depression and was advised to continue current medications as prescribed.    Pt reports symptoms are well controlled; follows with psych services     PHQ-2/9 Depression Screening    Little interest or pleasure in doing things: 0 - not at all  Feeling down, depressed, or hopeless: 1 - several days  Trouble falling or staying asleep, or sleeping too much: 1 - several days  Feeling tired or having little energy: 1 - several days  Poor appetite or overeatin - more than half the days  Feeling bad about yourself - or that you are a failure or have let yourself or your family down: 1 - several days  Trouble concentrating on things, such as reading the newspaper or watching television: 0 - not at all  Moving or speaking so slowly that other people could have noticed. Or the opposite - being so fidgety or restless that you have been moving around a lot more than usual: 0 - not at all  Thoughts that you would be better off dead, or of hurting yourself in some way: 0 - not at all  PHQ-9 Score: 6  PHQ-9 Interpretation: Mild depression

## 2025-02-25 NOTE — ASSESSMENT & PLAN NOTE
Reports abnormal liver function tests in the past- repeat labs ordered- denies any s/s   Orders:    Hepatic function panel; Future    Ambulatory Referral to Gastroenterology; Future

## 2025-02-28 ENCOUNTER — APPOINTMENT (OUTPATIENT)
Age: 25
End: 2025-02-28
Payer: MEDICARE

## 2025-02-28 DIAGNOSIS — Z13.220 SCREENING FOR LIPID DISORDERS: ICD-10-CM

## 2025-02-28 DIAGNOSIS — R79.89 ELEVATED LFTS: ICD-10-CM

## 2025-02-28 DIAGNOSIS — Z13.29 SCREENING FOR THYROID DISORDER: ICD-10-CM

## 2025-02-28 DIAGNOSIS — Z00.6 ENCOUNTER FOR EXAMINATION FOR NORMAL COMPARISON OR CONTROL IN CLINICAL RESEARCH PROGRAM: ICD-10-CM

## 2025-02-28 DIAGNOSIS — Z13.1 SCREENING FOR DIABETES MELLITUS: ICD-10-CM

## 2025-02-28 DIAGNOSIS — Z76.89 ENCOUNTER TO ESTABLISH CARE: ICD-10-CM

## 2025-02-28 DIAGNOSIS — Z13.0 SCREENING FOR DEFICIENCY ANEMIA: ICD-10-CM

## 2025-02-28 LAB
ALBUMIN SERPL BCG-MCNC: 4.3 G/DL (ref 3.5–5)
ALP SERPL-CCNC: 121 U/L (ref 34–104)
ALT SERPL W P-5'-P-CCNC: 64 U/L (ref 7–52)
ANION GAP SERPL CALCULATED.3IONS-SCNC: 10 MMOL/L (ref 4–13)
AST SERPL W P-5'-P-CCNC: 31 U/L (ref 13–39)
BASOPHILS # BLD AUTO: 0.05 THOUSANDS/ÂΜL (ref 0–0.1)
BASOPHILS NFR BLD AUTO: 0 % (ref 0–1)
BILIRUB DIRECT SERPL-MCNC: 0.09 MG/DL (ref 0–0.2)
BILIRUB SERPL-MCNC: 0.49 MG/DL (ref 0.2–1)
BUN SERPL-MCNC: 8 MG/DL (ref 5–25)
CALCIUM SERPL-MCNC: 9.9 MG/DL (ref 8.4–10.2)
CHLORIDE SERPL-SCNC: 102 MMOL/L (ref 96–108)
CHOLEST SERPL-MCNC: 204 MG/DL (ref ?–200)
CO2 SERPL-SCNC: 27 MMOL/L (ref 21–32)
CREAT SERPL-MCNC: 0.65 MG/DL (ref 0.6–1.3)
EOSINOPHIL # BLD AUTO: 0.27 THOUSAND/ÂΜL (ref 0–0.61)
EOSINOPHIL NFR BLD AUTO: 2 % (ref 0–6)
ERYTHROCYTE [DISTWIDTH] IN BLOOD BY AUTOMATED COUNT: 13.4 % (ref 11.6–15.1)
EST. AVERAGE GLUCOSE BLD GHB EST-MCNC: 100 MG/DL
GFR SERPL CREATININE-BSD FRML MDRD: 125 ML/MIN/1.73SQ M
GLUCOSE P FAST SERPL-MCNC: 80 MG/DL (ref 65–99)
HBA1C MFR BLD: 5.1 %
HCT VFR BLD AUTO: 41.4 % (ref 34.8–46.1)
HDLC SERPL-MCNC: 48 MG/DL
HGB BLD-MCNC: 13.6 G/DL (ref 11.5–15.4)
IMM GRANULOCYTES # BLD AUTO: 0.04 THOUSAND/UL (ref 0–0.2)
IMM GRANULOCYTES NFR BLD AUTO: 0 % (ref 0–2)
LDLC SERPL CALC-MCNC: 132 MG/DL (ref 0–100)
LYMPHOCYTES # BLD AUTO: 3.2 THOUSANDS/ÂΜL (ref 0.6–4.47)
LYMPHOCYTES NFR BLD AUTO: 28 % (ref 14–44)
MCH RBC QN AUTO: 28.2 PG (ref 26.8–34.3)
MCHC RBC AUTO-ENTMCNC: 32.9 G/DL (ref 31.4–37.4)
MCV RBC AUTO: 86 FL (ref 82–98)
MONOCYTES # BLD AUTO: 0.52 THOUSAND/ÂΜL (ref 0.17–1.22)
MONOCYTES NFR BLD AUTO: 5 % (ref 4–12)
NEUTROPHILS # BLD AUTO: 7.25 THOUSANDS/ÂΜL (ref 1.85–7.62)
NEUTS SEG NFR BLD AUTO: 65 % (ref 43–75)
NONHDLC SERPL-MCNC: 156 MG/DL
NRBC BLD AUTO-RTO: 0 /100 WBCS
PLATELET # BLD AUTO: 321 THOUSANDS/UL (ref 149–390)
PMV BLD AUTO: 9.8 FL (ref 8.9–12.7)
POTASSIUM SERPL-SCNC: 4.1 MMOL/L (ref 3.5–5.3)
PROT SERPL-MCNC: 7.5 G/DL (ref 6.4–8.4)
RBC # BLD AUTO: 4.83 MILLION/UL (ref 3.81–5.12)
SODIUM SERPL-SCNC: 139 MMOL/L (ref 135–147)
TRIGL SERPL-MCNC: 120 MG/DL (ref ?–150)
TSH SERPL DL<=0.05 MIU/L-ACNC: 1.55 UIU/ML (ref 0.45–4.5)
WBC # BLD AUTO: 11.33 THOUSAND/UL (ref 4.31–10.16)

## 2025-02-28 PROCEDURE — 80053 COMPREHEN METABOLIC PANEL: CPT

## 2025-02-28 PROCEDURE — 85025 COMPLETE CBC W/AUTO DIFF WBC: CPT

## 2025-02-28 PROCEDURE — 84443 ASSAY THYROID STIM HORMONE: CPT

## 2025-02-28 PROCEDURE — 36415 COLL VENOUS BLD VENIPUNCTURE: CPT

## 2025-02-28 PROCEDURE — 82248 BILIRUBIN DIRECT: CPT

## 2025-02-28 PROCEDURE — 83036 HEMOGLOBIN GLYCOSYLATED A1C: CPT

## 2025-02-28 PROCEDURE — 80061 LIPID PANEL: CPT

## 2025-03-03 ENCOUNTER — RESULTS FOLLOW-UP (OUTPATIENT)
Age: 25
End: 2025-03-03

## 2025-03-04 DIAGNOSIS — R79.89 ELEVATED LFTS: Primary | ICD-10-CM

## 2025-03-04 NOTE — TELEPHONE ENCOUNTER
----- Message from Nahomi Hair sent at 3/4/2025  8:23 AM EST -----  Please call patient and let him know that his CBC was normal TSH was normal, Bilirubin was normal, A1c was normal, lipid panel showed slightly elevated cholesterol, and LDL- educated on importance of eating heart healthy diet and daily exercise     CMP showed elevated liver enzymes, any recent alcohol use? Medication use?

## 2025-03-04 NOTE — TELEPHONE ENCOUNTER
A.  Relayed results to (patient/patient representative as listed on communication consent form) as per provider message. Patient/Patient Representative expressed understanding and had the following question(s): Patient returned call stating no recent alcohol use, and she only uses medications that are in chart, nothing else .  Please advise.     B. Route to OFFICE CLINICAL POOL for follow-up with provider.

## 2025-03-12 ENCOUNTER — TELEPHONE (OUTPATIENT)
Age: 25
End: 2025-03-12

## 2025-03-12 NOTE — TELEPHONE ENCOUNTER
I spoke with Homar and she explained that depending what type of Holter is ordered Cardiology may need to see her first. I told the patient I will call Cardiology tomorrow for clarification. The soonest Cardiology said they could see her was in Aug 25, 2025. I will call the patient back with the next steps (after speaking to the provider).

## 2025-03-12 NOTE — TELEPHONE ENCOUNTER
Patient called and was wondering what the status of the HOLTER MONITOR was. He said it was ordered and supposed to arrive at his house, but still hasn't received it yet.    Please advise and follow up with patient @[phone]

## 2025-03-12 NOTE — TELEPHONE ENCOUNTER
Called patient and provided central scheduling phone number. Patient is asked to message me back and I will find out exactly what she should do to get this scheduled.

## 2025-03-13 NOTE — TELEPHONE ENCOUNTER
I called [facility] and spoke to Lucio in regards to Holter Monitor and patient stating she can't have this done prior to Cardiology appt. I was given more details from Lucio that An Extended Holter Monitor is what was ordered by Nahomi HEMPHILL. Lucio said those are more involved Biotel patches are shipped to the patient to work with a Zio monitor. Patient would schedult a nurse visit and a Cardiology MA would put them on for the patient. However patient has never been seen at [facility] and needs to be seen first. Patient said she is scheduled for 8/28 at Cardiology.A message will be sent to the provider asking if she wanted to order an extended Holter or was this just a regular holter? Please advise?

## 2025-03-13 NOTE — TELEPHONE ENCOUNTER
She needs the Cardiology appt. Prior to getting this. The reason her appt. Is so far out 8/25/25 is because she requested Dr. Damon. I called Homar and asked if she needed to see Dr. Damon or would she consider seeing another Cardiologist? Homar said she wants a woman provider and Dr. Damon is. I am not sure if there is another woman in the practice. The only other thing is the urgency if you feel the Zio monitor  should be sooner than later Cardiology might consider seeing the patient sooner. Demetri was very knowledgeable and explained in detail the process of this monitor and she said the Cardiology Medical assistants place the Biotel patches at a nurse appt. After she receives them in the mail. Homar has never been seen by the practice and would need to do that prior to Zio Monitor. Please advise? Thank you

## 2025-03-14 LAB
APOB+LDLR+PCSK9 GENE MUT ANL BLD/T: NOT DETECTED
BRCA1+BRCA2 DEL+DUP + FULL MUT ANL BLD/T: NOT DETECTED
MLH1+MSH2+MSH6+PMS2 GN DEL+DUP+FUL M: NOT DETECTED

## 2025-03-14 NOTE — TELEPHONE ENCOUNTER
She can wait for her cardiology appointment; unless she is having symptoms of chest pain or shortness of breath, then she needs to go to the ED

## 2025-04-01 ENCOUNTER — APPOINTMENT (OUTPATIENT)
Age: 25
End: 2025-04-01
Payer: MEDICARE

## 2025-04-01 ENCOUNTER — OFFICE VISIT (OUTPATIENT)
Age: 25
End: 2025-04-01
Payer: MEDICARE

## 2025-04-01 VITALS
BODY MASS INDEX: 34.97 KG/M2 | SYSTOLIC BLOOD PRESSURE: 108 MMHG | WEIGHT: 236.11 LBS | OXYGEN SATURATION: 97 % | HEART RATE: 106 BPM | DIASTOLIC BLOOD PRESSURE: 86 MMHG | HEIGHT: 69 IN

## 2025-04-01 DIAGNOSIS — F95.2 GILLES DE LA TOURETTE'S SYNDROME: ICD-10-CM

## 2025-04-01 DIAGNOSIS — H92.03 CHRONIC PAIN OF BOTH EARS: ICD-10-CM

## 2025-04-01 DIAGNOSIS — F44.5 PSYCHOGENIC NONEPILEPTIC SEIZURE: ICD-10-CM

## 2025-04-01 DIAGNOSIS — G90.A POTS (POSTURAL ORTHOSTATIC TACHYCARDIA SYNDROME): ICD-10-CM

## 2025-04-01 DIAGNOSIS — F41.9 ANXIETY: ICD-10-CM

## 2025-04-01 DIAGNOSIS — R19.4 CHANGE IN BOWEL HABIT: ICD-10-CM

## 2025-04-01 DIAGNOSIS — F42.9 OBSESSIVE-COMPULSIVE DISORDER, UNSPECIFIED TYPE: ICD-10-CM

## 2025-04-01 DIAGNOSIS — Z00.00 ANNUAL PHYSICAL EXAM: Primary | ICD-10-CM

## 2025-04-01 DIAGNOSIS — F43.10 POSTTRAUMATIC STRESS DISORDER: ICD-10-CM

## 2025-04-01 DIAGNOSIS — R11.0 CHRONIC NAUSEA: ICD-10-CM

## 2025-04-01 DIAGNOSIS — M25.561 ACUTE PAIN OF RIGHT KNEE: ICD-10-CM

## 2025-04-01 DIAGNOSIS — G40.89 OTHER SEIZURES (HCC): ICD-10-CM

## 2025-04-01 DIAGNOSIS — G89.29 CHRONIC PAIN OF BOTH EARS: ICD-10-CM

## 2025-04-01 DIAGNOSIS — F32.4 MAJOR DEPRESSIVE DISORDER IN PARTIAL REMISSION, UNSPECIFIED WHETHER RECURRENT (HCC): ICD-10-CM

## 2025-04-01 DIAGNOSIS — G43.009 MIGRAINE WITHOUT AURA AND WITHOUT STATUS MIGRAINOSUS, NOT INTRACTABLE: ICD-10-CM

## 2025-04-01 DIAGNOSIS — R79.89 ELEVATED LFTS: ICD-10-CM

## 2025-04-01 PROCEDURE — 73562 X-RAY EXAM OF KNEE 3: CPT

## 2025-04-01 PROCEDURE — 99395 PREV VISIT EST AGE 18-39: CPT

## 2025-04-01 RX ORDER — ONDANSETRON 4 MG/1
4 TABLET, ORALLY DISINTEGRATING ORAL EVERY 8 HOURS PRN
Qty: 20 TABLET | Refills: 0 | Status: SHIPPED | OUTPATIENT
Start: 2025-04-01 | End: 2025-04-01

## 2025-04-01 RX ORDER — ONDANSETRON 4 MG/1
4 TABLET, FILM COATED ORAL EVERY 8 HOURS PRN
Qty: 20 TABLET | Refills: 0 | Status: SHIPPED | OUTPATIENT
Start: 2025-04-01

## 2025-04-01 NOTE — PATIENT INSTRUCTIONS
"Patient Education     Routine physical for adults   The Basics   Written by the doctors and editors at [facility]   What is a physical? -- A physical is a routine visit, or \"check-up,\" with your doctor. You might also hear it called a \"wellness visit\" or \"preventive visit.\"  During each visit, the doctor will:   Ask about your physical and mental health   Ask about your habits, behaviors, and lifestyle   Do an exam   Give you vaccines if needed   Talk to you about any medicines you take   Give advice about your health   Answer your questions  Getting regular check-ups is an important part of taking care of your health. It can help your doctor find and treat any problems you have. But it's also important for preventing health problems.  A routine physical is different from a \"sick visit.\" A sick visit is when you see a doctor because of a health concern or problem. Since physicals are scheduled ahead of time, you can think about what you want to ask the doctor.  How often should I get a physical? -- It depends on your age and health. In general, for people age 21 years and older:   If you are younger than 50 years, you might be able to get a physical every 3 years.   If you are 50 years or older, your doctor might recommend a physical every year.  If you have an ongoing health condition, like diabetes or high blood pressure, your doctor will probably want to see you more often.  What happens during a physical? -- In general, each visit will include:   Physical exam - The doctor or nurse will check your height, weight, heart rate, and blood pressure. They will also look at your eyes and ears. They will ask about how you are feeling and whether you have any symptoms that bother you.   Medicines - It's a good idea to bring a list of all the medicines you take to each doctor visit. Your doctor will talk to you about your medicines and answer any questions. Tell them if you are having any side effects that bother you. You " "should also tell them if you are having trouble paying for any of your medicines.   Habits and behaviors - This includes:   Your diet   Your exercise habits   Whether you smoke, drink alcohol, or use drugs   Whether you are sexually active   Whether you feel safe at home  Your doctor will talk to you about things you can do to improve your health and lower your risk of health problems. They will also offer help and support. For example, if you want to quit smoking, they can give you advice and might prescribe medicines. If you want to improve your diet or get more physical activity, they can help you with this, too.   Lab tests, if needed - The tests you get will depend on your age and situation. For example, your doctor might want to check your:   Cholesterol   Blood sugar   Iron level   Vaccines - The recommended vaccines will depend on your age, health, and what vaccines you already had. Vaccines are very important because they can prevent certain serious or deadly infections.   Discussion of screening - \"Screening\" means checking for diseases or other health problems before they cause symptoms. Your doctor can recommend screening based on your age, risk, and preferences. This might include tests to check for:   Cancer, such as breast, prostate, cervical, ovarian, colorectal, prostate, lung, or skin cancer   Sexually transmitted infections, such as chlamydia and gonorrhea   Mental health conditions like depression and anxiety  Your doctor will talk to you about the different types of screening tests. They can help you decide which screenings to have. They can also explain what the results might mean.   Answering questions - The physical is a good time to ask the doctor or nurse questions about your health. If needed, they can refer you to other doctors or specialists, too.  Adults older than 65 years often need other care, too. As you get older, your doctor will talk to you about:   How to prevent falling at " home   Hearing or vision tests   Memory testing   How to take your medicines safely   Making sure that you have the help and support you need at home  All topics are updated as new evidence becomes available and our peer review process is complete.  This topic retrieved from [employer] on: May 02, 2024.  Topic 867195 Version 1.0  Release: 32.4.3 - C32.122  © 2024 UpToDate, Inc. and/or its affiliates. All rights reserved.  Consumer Information Use and Disclaimer   Disclaimer: This generalized information is a limited summary of diagnosis, treatment, and/or medication information. It is not meant to be comprehensive and should be used as a tool to help the user understand and/or assess potential diagnostic and treatment options. It does NOT include all information about conditions, treatments, medications, side effects, or risks that may apply to a specific patient. It is not intended to be medical advice or a substitute for the medical advice, diagnosis, or treatment of a health care provider based on the health care provider's examination and assessment of a patient's specific and unique circumstances. Patients must speak with a health care provider for complete information about their health, medical questions, and treatment options, including any risks or benefits regarding use of medications. This information does not endorse any treatments or medications as safe, effective, or approved for treating a specific patient. UpToDate, Inc. and its affiliates disclaim any warranty or liability relating to this information or the use thereof.The use of this information is governed by the Terms of Use, available at https://www.[employer]uwer.com/en/know/clinical-effectiveness-terms. 2024© UpToDate, Inc. and its affiliates and/or licensors. All rights reserved.  Copyright   © 2024 UpToDate, Inc. and/or its affiliates. All rights reserved.

## 2025-04-01 NOTE — ASSESSMENT & PLAN NOTE
Pt reports that she has been having increase seizures over the past couple of months; patient reports having a seizure; declines medication at this time; patient would like to see neurology first; educated on s/s on when to go to the ED    Patient does not drive;

## 2025-04-01 NOTE — ASSESSMENT & PLAN NOTE
Pt reports that she has been having increase seizures over the past couple of months; patient reports having a seizure; declines medication at this time; patient would like to see neurology first; educated on s/s on when to go to the ED    Patient does not drive;   Orders:    Ambulatory Referral to Neurology; Future

## 2025-04-01 NOTE — ASSESSMENT & PLAN NOTE
Provider ordered cardiac monitor; pt needs to be seen by cardiologist first- reports chronic chest pain and shortness of breath;     EKG at last appointment showed NSR     Patient has a cardiology appointment in August 2025    Educated on s/s of when to go to the ED; verbalized understanding

## 2025-04-01 NOTE — PROGRESS NOTES
Name: Alexis Jules      : 2000      MRN: [id number]  Encounter Provider: Nahomi Hair  Encounter Date: 2025   Encounter department: [facility]  :  Assessment & Plan  Annual physical exam  Pt seen in the office today for annual physical; pt reports that she has follow up appointment with GI and cardiology scheduled; pt reports increase in seizure activity at home; pt reports that she was seen by a neurologist in the past and was told that she needed to go to therapy; pt is currently in therapy; educated on s/s of when to go to the ED- pt verbalized understanding     UTD on dental exam   UTD on vision exams    F/u in 1 month    Migraine without aura and without status migrainosus, not intractable  Referral placed for neurology; stable- pt reports that medication     POTS (postural orthostatic tachycardia syndrome)  Provider ordered cardiac monitor; pt needs to be seen by cardiologist first- reports chronic chest pain and shortness of breath;     EKG at last appointment showed NSR     Patient has a cardiology appointment in 2025    Educated on s/s of when to go to the ED; verbalized understanding     Seymour de la Tourette's syndrome  Stable      Anxiety  Stable; pt follows with psych services     Major depressive disorder in partial remission, unspecified whether recurrent (HCC)  Stable; pt follows with psych services     Obsessive-compulsive disorder, unspecified type  Stable; pt follows with psych services       Posttraumatic stress disorder  Stable; pt follows with psych services       Psychogenic nonepileptic seizure  Pt reports that she has been having increase seizures over the past couple of months; patient reports having a seizure; declines medication at this time; patient would like to see neurology first; educated on s/s on when to go to the ED    Patient does not drive;     Other seizures (HCC)  Pt reports that she has been having increase seizures over the  past couple of months; patient reports having a seizure; declines medication at this time; patient would like to see neurology first; educated on s/s on when to go to the ED    Patient does not drive;   Orders:    Ambulatory Referral to Neurology; Future    Change in bowel habit  Pt reports that she has a GI appointment tomorrow;    Elevated LFTs  Pt reports that she has a GI appointment tomorrow; repeat LFTs ordered     Chronic pain of both ears  Pt reports chronic pain in both ears; denies fevers or redness;     Exam was normal     Patient would like to go to see ENT for treatment of chronic pain   Orders:    Ambulatory Referral to Otolaryngology; Future    Acute pain of right knee  Pt reports right knee pain after falling- three small abrasions noted on knee     Pt reports pain with movement- normal ROM- patient would like a XR   Orders:    XR knee 3 vw right non injury; Future    Chronic nausea  Pt reports on and off chronic nausea; reports mild generalized abdominal pain with nausea; patient is seeing GI tomorrow     Orders:    ondansetron (Zofran) 4 mg tablet; Take 1 tablet (4 mg total) by mouth every 8 (eight) hours as needed for nausea or vomiting           History of Present Illness   HPI  Alexis Jules is a 24 year old female seen in the office today for annual physical; pt reports that she has follow up appointment with GI and cardiology scheduled; pt reports increase in seizure activity at home; pt reports that she was seen by a neurologist in the past and was told that she needed to go to therapy; pt is currently in therapy; educated on s/s of when to go to the ED- pt verbalized understanding     UTD on dental exam   UTD on vision exams    F/u in 1 month    Review of Systems   Constitutional:  Negative for chills and fever.   HENT:  Positive for ear pain. Negative for ear discharge, sore throat and tinnitus.    Eyes:  Negative for pain and visual disturbance.   Respiratory:  Negative for cough and  "shortness of breath.    Cardiovascular:  Negative for chest pain and palpitations.   Gastrointestinal:  Positive for nausea. Negative for abdominal pain and vomiting.   Genitourinary:  Negative for difficulty urinating, dysuria and hematuria.   Musculoskeletal:  Negative for arthralgias and back pain.   Skin:  Positive for wound. Negative for color change and rash.        Three small abrasions noted on right knee     Neurological:  Positive for seizures and headaches. Negative for dizziness and syncope.   Psychiatric/Behavioral:  Negative for confusion.    All other systems reviewed and are negative.      Objective   /86 (BP Location: Left arm, Patient Position: Sitting, Cuff Size: Large)   Pulse (!) 106   Ht 5' 8.75\" (1.746 m)   Wt 107 kg (236 lb 1.8 oz)   SpO2 97%   BMI 35.12 kg/m²      Physical Exam  Vitals and nursing note reviewed.   Constitutional:       General: She is not in acute distress.     Appearance: She is well-developed.   HENT:      Head: Normocephalic and atraumatic.      Right Ear: Tympanic membrane, ear canal and external ear normal.      Left Ear: Tympanic membrane, ear canal and external ear normal.      Nose: Nose normal.      Mouth/Throat:      Mouth: Mucous membranes are moist.   Eyes:      Conjunctiva/sclera: Conjunctivae normal.   Cardiovascular:      Rate and Rhythm: Normal rate and regular rhythm.      Pulses: Normal pulses.      Heart sounds: Normal heart sounds. No murmur heard.  Pulmonary:      Effort: Pulmonary effort is normal. No respiratory distress.      Breath sounds: Normal breath sounds.   Abdominal:      General: Bowel sounds are normal.      Palpations: Abdomen is soft.      Tenderness: There is abdominal tenderness.      Comments: Generalized tenderness     Musculoskeletal:         General: No swelling. Normal range of motion.      Cervical back: Normal range of motion and neck supple.      Right lower leg: No edema.      Left lower leg: No edema.   Skin:     " General: Skin is warm and dry.   Neurological:      Mental Status: She is alert. Mental status is at baseline.      Motor: No weakness.      Gait: Gait normal.   Psychiatric:         Mood and Affect: Mood normal.

## 2025-04-02 ENCOUNTER — OFFICE VISIT (OUTPATIENT)
Dept: GASTROENTEROLOGY | Facility: CLINIC | Age: 25
End: 2025-04-02
Payer: MEDICARE

## 2025-04-02 VITALS
SYSTOLIC BLOOD PRESSURE: 122 MMHG | HEART RATE: 100 BPM | WEIGHT: 236 LBS | BODY MASS INDEX: 34.96 KG/M2 | HEIGHT: 69 IN | DIASTOLIC BLOOD PRESSURE: 78 MMHG

## 2025-04-02 DIAGNOSIS — Z11.59 NEED FOR HEPATITIS C SCREENING TEST: ICD-10-CM

## 2025-04-02 DIAGNOSIS — R11.0 NAUSEA: ICD-10-CM

## 2025-04-02 DIAGNOSIS — R19.4 CHANGE IN BOWEL HABIT: Primary | ICD-10-CM

## 2025-04-02 DIAGNOSIS — R79.89 ELEVATED LFTS: ICD-10-CM

## 2025-04-02 DIAGNOSIS — Z11.59 NEED FOR HEPATITIS B SCREENING TEST: ICD-10-CM

## 2025-04-02 DIAGNOSIS — K21.9 GASTROESOPHAGEAL REFLUX DISEASE, UNSPECIFIED WHETHER ESOPHAGITIS PRESENT: ICD-10-CM

## 2025-04-02 DIAGNOSIS — R10.9 RIGHT SIDED ABDOMINAL PAIN: ICD-10-CM

## 2025-04-02 DIAGNOSIS — R19.7 DIARRHEA, UNSPECIFIED TYPE: ICD-10-CM

## 2025-04-02 DIAGNOSIS — Z90.49 HISTORY OF CHOLECYSTECTOMY: ICD-10-CM

## 2025-04-02 PROBLEM — K74.60 LIVER CIRRHOSIS (HCC): Status: RESOLVED | Noted: 2025-01-03 | Resolved: 2025-04-02

## 2025-04-02 PROCEDURE — 99204 OFFICE O/P NEW MOD 45 MIN: CPT | Performed by: PHYSICIAN ASSISTANT

## 2025-04-02 RX ORDER — OMEPRAZOLE 40 MG/1
40 CAPSULE, DELAYED RELEASE ORAL
Qty: 30 CAPSULE | Refills: 4 | Status: SHIPPED | OUTPATIENT
Start: 2025-04-02

## 2025-04-02 RX ORDER — SODIUM CHLORIDE, SODIUM LACTATE, POTASSIUM CHLORIDE, CALCIUM CHLORIDE 600; 310; 30; 20 MG/100ML; MG/100ML; MG/100ML; MG/100ML
125 INJECTION, SOLUTION INTRAVENOUS CONTINUOUS
OUTPATIENT
Start: 2025-04-02

## 2025-04-02 NOTE — PROGRESS NOTES
Name: Alexis Jules      : 2000      MRN: [id number]  Encounter Provider: Anay Bojorquez PA-C  Encounter Date: 2025   Encounter department: [facility] [place]  :  Assessment & Plan  Change in bowel habit  Patient with multiple GI complaints including change in bowel habits with both diarrhea and constipation, abdominal pain, nausea, significant reflux symptoms.  She is status post cholecystectomy.  She has had rectal bleeding as well.  No prior colonoscopy.    I had a long discussion with the patient today regarding differential diagnosis.  I do suspect that she may have bile salt induced diarrhea/postcholecystectomy irritable bowel  Recommended patient begin using Metamucil or Benefiber powder fiber supplement once daily (over-the-counter) dissolved in water at dinnertime to help bulk and regulate stools.  Recommend checking CRP and celiac panel  Will plan for both EGD with biopsy and colonoscopy with TI intubation and pancolonic biopsies to rule out possible underlying microscopic colitis and inflammatory bowel disease to be completed in the hospital setting    Orders:    Ambulatory Referral to Gastroenterology    Colonoscopy; Future    Tissue Transglutaminase, IgA; Future    IgA; Future    C-reactive protein; Future    polyethylene glycol (GOLYTELY) 4000 mL solution; Take 4,000 mL by mouth once for 1 dose Take as directed by office instructions prior to colonoscopy.    Diarrhea, unspecified type  As above.  Will check stool Giardia, ova and parasite, fecal calprotectin for completeness  Orders:    Calprotectin,Fecal; Future    Giardia antigen; Future    Ova and parasite examination; Future    Right sided abdominal pain  As above.   Orders:    Colonoscopy; Future    EGD; Future    Nausea  As above.   Orders:    Colonoscopy; Future    EGD; Future    omeprazole (PriLOSEC) 40 MG capsule; Take 1 capsule (40 mg total) by mouth daily before breakfast    History of  cholecystectomy  As above.        Gastroesophageal reflux disease, unspecified whether esophagitis present  Patient has significant GERD symptoms worsening over the last several weeks despite trial of over-the-counter antacid  Will start patient on omeprazole 40 mg once daily before breakfast and monitor symptoms.  She will undergo EGD at time of colonoscopy in the hospital setting  I otherwise educated patient on GERD diet and lifestyle including avoidance of trigger foods including fatty, greasy, spicy foods, chocolate, caffeine, alcohol, citrus foods, tomato sauces. We discussed  the importance of eating smaller frequent meals, not eating close to bedtime. I also advised to limit NSAIDs if able.    Orders:    EGD; Future    omeprazole (PriLOSEC) 40 MG capsule; Take 1 capsule (40 mg total) by mouth daily before breakfast    Elevated LFTs  Noted on labs.  Will order repeat hepatic function panel and routine hepatitis testing.  Will also check right upper quadrant ultrasound.  Orders:    US right upper quadrant; Future    Hepatic function panel; Future    Need for hepatitis C screening test  Will order  Orders:    Hepatitis C antibody; Future    Need for hepatitis B screening test  Will order  Orders:    Hepatitis B surface antigen; Future    Hepatitis B core antibody, total; Future    Hepatitis B surface antibody; Future      Patient was instructed to call the office with any questions, concerns, new/ worsening/ persisting GI symptoms. Advised patient go to the ER with any severe or worsening abdominal pain, fevers/ chills, intractable N/V, chest pain, SOB, dizziness, lightheadedness, feeling something stuck in esophagus that will not go down. Patient expressed understanding and is in agreement with treatment plan.     Will plan to follow up after diagnostic tests in a few months     History of Present Illness   HPI  Alexis Jules is a 24 y.o. female with a past medical history of anxiety, OCD, PTSD, POTS, migraines,  "history of depression, history of endometriosis status post hysterectomy in January 2025, history of gallstone disease status post ERCP and cholecystectomy at outside hospital in 2023 who presents to the office today as a referral to discuss multiple GI complaints.    Patient notes change in bowels x few years. Bms alternate between diarrhea and constipation. Her bowels are \"all over the place\". She can have urgency with Bms. These changes have worsened since gallbladder removal in 2023. She has seen bright red blood in stool at times.   There is associated right sided abdominal pain that has been occurring x years. This pain is constant.   She complains of heartburn and acid reflux several days a week. She has been dealing with these symptoms for years. Heartburn worsening the last several weeks. Triggers include pizza and oranges.  She has taken antacids and Prilosec OTC for several weeks in the past without relief.  Currently she is taking Tums very regularly.  There is constant nausea, no vomiting.   She denies difficulty swallowing.   She notes recent weight gain, perhaps due to Depo shot. She is recently trying to lose weight.     Tobacco use- Patient vapes and smokes cigarettes   Alcohol use- None currently   NSAID use- Occasionally     Patient denies first-degree relative with colon cancer, inflammatory bowel disease, celiac disease     She denies personal history of liver disease       Review of Systems   Constitutional:  Negative for chills and fever.   HENT:  Negative for ear pain and sore throat.    Eyes:  Negative for pain and visual disturbance.   Respiratory:  Negative for cough and shortness of breath.    Cardiovascular:  Negative for chest pain and palpitations.   Gastrointestinal:  Positive for abdominal pain, blood in stool, constipation, diarrhea and nausea. Negative for vomiting.   Genitourinary:  Negative for dysuria and hematuria.   Musculoskeletal:  Negative for arthralgias and back pain. " "  Skin:  Negative for color change and rash.   Neurological:  Negative for seizures and syncope.   All other systems reviewed and are negative.      Objective   /78   Pulse 100   Ht 5' 8.7\" (1.745 m)   Wt 107 kg (236 lb)   BMI 35.16 kg/m²      Physical Exam  Vitals reviewed.   Constitutional:       General: She is not in acute distress.     Appearance: She is obese. She is not toxic-appearing.   HENT:      Head: Normocephalic and atraumatic.   Eyes:      Extraocular Movements: Extraocular movements intact.      Conjunctiva/sclera: Conjunctivae normal.   Cardiovascular:      Rate and Rhythm: Normal rate and regular rhythm.   Pulmonary:      Effort: Pulmonary effort is normal. No respiratory distress.      Breath sounds: Normal breath sounds.   Abdominal:      General: Bowel sounds are normal.      Palpations: Abdomen is soft.      Tenderness: There is abdominal tenderness.   Musculoskeletal:         General: No swelling or tenderness.      Cervical back: Normal range of motion and neck supple.   Skin:     General: Skin is warm and dry.      Coloration: Skin is not jaundiced.   Neurological:      General: No focal deficit present.      Mental Status: She is alert and oriented to person, place, and time. Mental status is at baseline.   Psychiatric:         Mood and Affect: Mood normal.         Behavior: Behavior normal.         Thought Content: Thought content normal.       Lab Results   Component Value Date    WBC 11.33 (H) 02/28/2025    HGB 13.6 02/28/2025    HCT 41.4 02/28/2025    MCV 86 02/28/2025     02/28/2025     Lab Results   Component Value Date    SODIUM 139 02/28/2025    K 4.1 02/28/2025     02/28/2025    CO2 27 02/28/2025    AGAP 10 02/28/2025    BUN 8 02/28/2025    CREATININE 0.65 02/28/2025    GLUC 79 12/19/2023    GLUF 80 02/28/2025    CALCIUM 9.9 02/28/2025    AST 31 02/28/2025    ALT 64 (H) 02/28/2025    ALKPHOS 121 (H) 02/28/2025    TP 7.5 02/28/2025    TBILI 0.49 02/28/2025    " EGFR 125 02/28/2025     Lab Results   Component Value Date    JFL6PNRFVLQG 1.548 02/28/2025

## 2025-04-02 NOTE — PATIENT INSTRUCTIONS
Recommended patient begin using Metamucil or Benefiber powder fiber supplement once daily (over-the-counter) dissolved in water at dinnertime to help bulk and regulate stools.    Scheduled date of colonoscopy/EGD (as of today): 05/08/2025  Physician performing colonoscopy: Dr. Oma Orourke   Location of colonoscopy: [place]  Bowel prep reviewed with patient: Golytely   Instructions reviewed with patient by: Barbie NGUYEN   Clearances:  N/A

## 2025-04-02 NOTE — ASSESSMENT & PLAN NOTE
Noted on labs.  Will order repeat hepatic function panel and routine hepatitis testing.  Will also check right upper quadrant ultrasound.  Orders:    US right upper quadrant; Future    Hepatic function panel; Future

## 2025-04-02 NOTE — H&P (VIEW-ONLY)
Name: Alexis Jules      : 2000      MRN: [id number]  Encounter Provider: Anay Bojorquez PA-C  Encounter Date: 2025   Encounter department: [facility] [place]  :  Assessment & Plan  Change in bowel habit  Patient with multiple GI complaints including change in bowel habits with both diarrhea and constipation, abdominal pain, nausea, significant reflux symptoms.  She is status post cholecystectomy.  She has had rectal bleeding as well.  No prior colonoscopy.    I had a long discussion with the patient today regarding differential diagnosis.  I do suspect that she may have bile salt induced diarrhea/postcholecystectomy irritable bowel  Recommended patient begin using Metamucil or Benefiber powder fiber supplement once daily (over-the-counter) dissolved in water at dinnertime to help bulk and regulate stools.  Recommend checking CRP and celiac panel  Will plan for both EGD with biopsy and colonoscopy with TI intubation and pancolonic biopsies to rule out possible underlying microscopic colitis and inflammatory bowel disease to be completed in the hospital setting    Orders:    Ambulatory Referral to Gastroenterology    Colonoscopy; Future    Tissue Transglutaminase, IgA; Future    IgA; Future    C-reactive protein; Future    polyethylene glycol (GOLYTELY) 4000 mL solution; Take 4,000 mL by mouth once for 1 dose Take as directed by office instructions prior to colonoscopy.    Diarrhea, unspecified type  As above.  Will check stool Giardia, ova and parasite, fecal calprotectin for completeness  Orders:    Calprotectin,Fecal; Future    Giardia antigen; Future    Ova and parasite examination; Future    Right sided abdominal pain  As above.   Orders:    Colonoscopy; Future    EGD; Future    Nausea  As above.   Orders:    Colonoscopy; Future    EGD; Future    omeprazole (PriLOSEC) 40 MG capsule; Take 1 capsule (40 mg total) by mouth daily before breakfast    History of  cholecystectomy  As above.        Gastroesophageal reflux disease, unspecified whether esophagitis present  Patient has significant GERD symptoms worsening over the last several weeks despite trial of over-the-counter antacid  Will start patient on omeprazole 40 mg once daily before breakfast and monitor symptoms.  She will undergo EGD at time of colonoscopy in the hospital setting  I otherwise educated patient on GERD diet and lifestyle including avoidance of trigger foods including fatty, greasy, spicy foods, chocolate, caffeine, alcohol, citrus foods, tomato sauces. We discussed  the importance of eating smaller frequent meals, not eating close to bedtime. I also advised to limit NSAIDs if able.    Orders:    EGD; Future    omeprazole (PriLOSEC) 40 MG capsule; Take 1 capsule (40 mg total) by mouth daily before breakfast    Elevated LFTs  Noted on labs.  Will order repeat hepatic function panel and routine hepatitis testing.  Will also check right upper quadrant ultrasound.  Orders:    US right upper quadrant; Future    Hepatic function panel; Future    Need for hepatitis C screening test  Will order  Orders:    Hepatitis C antibody; Future    Need for hepatitis B screening test  Will order  Orders:    Hepatitis B surface antigen; Future    Hepatitis B core antibody, total; Future    Hepatitis B surface antibody; Future      Patient was instructed to call the office with any questions, concerns, new/ worsening/ persisting GI symptoms. Advised patient go to the ER with any severe or worsening abdominal pain, fevers/ chills, intractable N/V, chest pain, SOB, dizziness, lightheadedness, feeling something stuck in esophagus that will not go down. Patient expressed understanding and is in agreement with treatment plan.     Will plan to follow up after diagnostic tests in a few months     History of Present Illness   HPI  Alexis Jules is a 24 y.o. female with a past medical history of anxiety, OCD, PTSD, POTS, migraines,  "history of depression, history of endometriosis status post hysterectomy in January 2025, history of gallstone disease status post ERCP and cholecystectomy at outside hospital in 2023 who presents to the office today as a referral to discuss multiple GI complaints.    Patient notes change in bowels x few years. Bms alternate between diarrhea and constipation. Her bowels are \"all over the place\". She can have urgency with Bms. These changes have worsened since gallbladder removal in 2023. She has seen bright red blood in stool at times.   There is associated right sided abdominal pain that has been occurring x years. This pain is constant.   She complains of heartburn and acid reflux several days a week. She has been dealing with these symptoms for years. Heartburn worsening the last several weeks. Triggers include pizza and oranges.  She has taken antacids and Prilosec OTC for several weeks in the past without relief.  Currently she is taking Tums very regularly.  There is constant nausea, no vomiting.   She denies difficulty swallowing.   She notes recent weight gain, perhaps due to Depo shot. She is recently trying to lose weight.     Tobacco use- Patient vapes and smokes cigarettes   Alcohol use- None currently   NSAID use- Occasionally     Patient denies first-degree relative with colon cancer, inflammatory bowel disease, celiac disease     She denies personal history of liver disease       Review of Systems   Constitutional:  Negative for chills and fever.   HENT:  Negative for ear pain and sore throat.    Eyes:  Negative for pain and visual disturbance.   Respiratory:  Negative for cough and shortness of breath.    Cardiovascular:  Negative for chest pain and palpitations.   Gastrointestinal:  Positive for abdominal pain, blood in stool, constipation, diarrhea and nausea. Negative for vomiting.   Genitourinary:  Negative for dysuria and hematuria.   Musculoskeletal:  Negative for arthralgias and back pain. " "  Skin:  Negative for color change and rash.   Neurological:  Negative for seizures and syncope.   All other systems reviewed and are negative.      Objective   /78   Pulse 100   Ht 5' 8.7\" (1.745 m)   Wt 107 kg (236 lb)   BMI 35.16 kg/m²      Physical Exam  Vitals reviewed.   Constitutional:       General: She is not in acute distress.     Appearance: She is obese. She is not toxic-appearing.   HENT:      Head: Normocephalic and atraumatic.   Eyes:      Extraocular Movements: Extraocular movements intact.      Conjunctiva/sclera: Conjunctivae normal.   Cardiovascular:      Rate and Rhythm: Normal rate and regular rhythm.   Pulmonary:      Effort: Pulmonary effort is normal. No respiratory distress.      Breath sounds: Normal breath sounds.   Abdominal:      General: Bowel sounds are normal.      Palpations: Abdomen is soft.      Tenderness: There is abdominal tenderness.   Musculoskeletal:         General: No swelling or tenderness.      Cervical back: Normal range of motion and neck supple.   Skin:     General: Skin is warm and dry.      Coloration: Skin is not jaundiced.   Neurological:      General: No focal deficit present.      Mental Status: She is alert and oriented to person, place, and time. Mental status is at baseline.   Psychiatric:         Mood and Affect: Mood normal.         Behavior: Behavior normal.         Thought Content: Thought content normal.       Lab Results   Component Value Date    WBC 11.33 (H) 02/28/2025    HGB 13.6 02/28/2025    HCT 41.4 02/28/2025    MCV 86 02/28/2025     02/28/2025     Lab Results   Component Value Date    SODIUM 139 02/28/2025    K 4.1 02/28/2025     02/28/2025    CO2 27 02/28/2025    AGAP 10 02/28/2025    BUN 8 02/28/2025    CREATININE 0.65 02/28/2025    GLUC 79 12/19/2023    GLUF 80 02/28/2025    CALCIUM 9.9 02/28/2025    AST 31 02/28/2025    ALT 64 (H) 02/28/2025    ALKPHOS 121 (H) 02/28/2025    TP 7.5 02/28/2025    TBILI 0.49 02/28/2025    " EGFR 125 02/28/2025     Lab Results   Component Value Date    RES8NVXVIVER 1.548 02/28/2025

## 2025-04-02 NOTE — ASSESSMENT & PLAN NOTE
As above.   Orders:    Colonoscopy; Future    EGD; Future    omeprazole (PriLOSEC) 40 MG capsule; Take 1 capsule (40 mg total) by mouth daily before breakfast

## 2025-04-02 NOTE — ASSESSMENT & PLAN NOTE
Patient has significant GERD symptoms worsening over the last several weeks despite trial of over-the-counter antacid  Will start patient on omeprazole 40 mg once daily before breakfast and monitor symptoms.  She will undergo EGD at time of colonoscopy in the hospital setting  I otherwise educated patient on GERD diet and lifestyle including avoidance of trigger foods including fatty, greasy, spicy foods, chocolate, caffeine, alcohol, citrus foods, tomato sauces. We discussed  the importance of eating smaller frequent meals, not eating close to bedtime. I also advised to limit NSAIDs if able.    Orders:    EGD; Future    omeprazole (PriLOSEC) 40 MG capsule; Take 1 capsule (40 mg total) by mouth daily before breakfast

## 2025-04-02 NOTE — ASSESSMENT & PLAN NOTE
Patient with multiple GI complaints including change in bowel habits with both diarrhea and constipation, abdominal pain, nausea, significant reflux symptoms.  She is status post cholecystectomy.  She has had rectal bleeding as well.  No prior colonoscopy.    I had a long discussion with the patient today regarding differential diagnosis.  I do suspect that she may have bile salt induced diarrhea/postcholecystectomy irritable bowel  Recommended patient begin using Metamucil or Benefiber powder fiber supplement once daily (over-the-counter) dissolved in water at dinnertime to help bulk and regulate stools.  Recommend checking CRP and celiac panel  Will plan for both EGD with biopsy and colonoscopy with TI intubation and pancolonic biopsies to rule out possible underlying microscopic colitis and inflammatory bowel disease to be completed in the hospital setting    Orders:    Ambulatory Referral to Gastroenterology    Colonoscopy; Future    Tissue Transglutaminase, IgA; Future    IgA; Future    C-reactive protein; Future    polyethylene glycol (GOLYTELY) 4000 mL solution; Take 4,000 mL by mouth once for 1 dose Take as directed by office instructions prior to colonoscopy.

## 2025-04-05 ENCOUNTER — APPOINTMENT (OUTPATIENT)
Dept: LAB | Facility: CLINIC | Age: 25
End: 2025-04-05
Payer: MEDICARE

## 2025-04-05 DIAGNOSIS — R79.89 ELEVATED LFTS: ICD-10-CM

## 2025-04-05 DIAGNOSIS — Z11.59 NEED FOR HEPATITIS C SCREENING TEST: ICD-10-CM

## 2025-04-05 DIAGNOSIS — Z11.59 NEED FOR HEPATITIS B SCREENING TEST: ICD-10-CM

## 2025-04-05 DIAGNOSIS — R19.4 CHANGE IN BOWEL HABIT: ICD-10-CM

## 2025-04-05 LAB
ALBUMIN SERPL BCG-MCNC: 4.4 G/DL (ref 3.5–5)
ALP SERPL-CCNC: 108 U/L (ref 34–104)
ALT SERPL W P-5'-P-CCNC: 93 U/L (ref 7–52)
AST SERPL W P-5'-P-CCNC: 40 U/L (ref 13–39)
BILIRUB DIRECT SERPL-MCNC: 0.11 MG/DL (ref 0–0.2)
BILIRUB SERPL-MCNC: 0.39 MG/DL (ref 0.2–1)
CRP SERPL QL: 9.1 MG/L
IGA SERPL-MCNC: 165 MG/DL (ref 66–433)
PROT SERPL-MCNC: 7.5 G/DL (ref 6.4–8.4)

## 2025-04-05 PROCEDURE — 86140 C-REACTIVE PROTEIN: CPT

## 2025-04-05 PROCEDURE — 86364 TISS TRNSGLTMNASE EA IG CLAS: CPT

## 2025-04-05 PROCEDURE — 86704 HEP B CORE ANTIBODY TOTAL: CPT

## 2025-04-05 PROCEDURE — 86706 HEP B SURFACE ANTIBODY: CPT

## 2025-04-05 PROCEDURE — 86803 HEPATITIS C AB TEST: CPT

## 2025-04-05 PROCEDURE — 80076 HEPATIC FUNCTION PANEL: CPT

## 2025-04-05 PROCEDURE — 36415 COLL VENOUS BLD VENIPUNCTURE: CPT

## 2025-04-05 PROCEDURE — 87340 HEPATITIS B SURFACE AG IA: CPT

## 2025-04-05 PROCEDURE — 82784 ASSAY IGA/IGD/IGG/IGM EACH: CPT

## 2025-04-06 LAB
HBV CORE AB SER QL: NORMAL
HBV SURFACE AB SER-ACNC: <3 MIU/ML
HBV SURFACE AG SER QL: NORMAL
HCV AB SER QL: NORMAL

## 2025-04-07 ENCOUNTER — RESULTS FOLLOW-UP (OUTPATIENT)
Dept: GASTROENTEROLOGY | Facility: CLINIC | Age: 25
End: 2025-04-07

## 2025-04-07 ENCOUNTER — RESULTS FOLLOW-UP (OUTPATIENT)
Age: 25
End: 2025-04-07

## 2025-04-07 DIAGNOSIS — R79.89 ELEVATED LFTS: Primary | ICD-10-CM

## 2025-04-08 LAB — TTG IGA SER IA-ACNC: <0.4 U/ML (ref ?–10)

## 2025-04-11 ENCOUNTER — HOSPITAL ENCOUNTER (OUTPATIENT)
Dept: ULTRASOUND IMAGING | Facility: HOSPITAL | Age: 25
Discharge: HOME | End: 2025-04-11
Payer: MEDICARE

## 2025-04-11 DIAGNOSIS — R79.89 ELEVATED LFTS: ICD-10-CM

## 2025-04-11 PROCEDURE — 76705 ECHO EXAM OF ABDOMEN: CPT

## 2025-04-14 ENCOUNTER — TELEPHONE (OUTPATIENT)
Dept: GASTROENTEROLOGY | Facility: CLINIC | Age: 25
End: 2025-04-14

## 2025-04-14 NOTE — TELEPHONE ENCOUNTER
Scheduled date of EGD/colonoscopy (as of today): 4/25/25  Physician performing EGD/colonoscopy: Dr. Oma Orourke   Location of EGD/colonoscopy: [facility]   Desired bowel prep reviewed with patient: GOLYTELY  Instructions reviewed with patient by: WINDY  Clearances:  N/A

## 2025-04-24 RX ORDER — ONDANSETRON 2 MG/ML
4 INJECTION INTRAMUSCULAR; INTRAVENOUS EVERY 6 HOURS PRN
Status: CANCELLED | OUTPATIENT
Start: 2025-04-24

## 2025-04-25 ENCOUNTER — HOSPITAL ENCOUNTER (OUTPATIENT)
Dept: GASTROENTEROLOGY | Facility: HOSPITAL | Age: 25
Setting detail: OUTPATIENT SURGERY
End: 2025-04-25
Attending: PHYSICIAN ASSISTANT
Payer: MEDICARE

## 2025-04-25 ENCOUNTER — ANESTHESIA EVENT (OUTPATIENT)
Dept: GASTROENTEROLOGY | Facility: HOSPITAL | Age: 25
End: 2025-04-25
Payer: MEDICARE

## 2025-04-25 ENCOUNTER — ANESTHESIA (OUTPATIENT)
Dept: GASTROENTEROLOGY | Facility: HOSPITAL | Age: 25
End: 2025-04-25
Payer: MEDICARE

## 2025-04-25 VITALS
WEIGHT: 236 LBS | BODY MASS INDEX: 35.16 KG/M2 | TEMPERATURE: 97.5 F | OXYGEN SATURATION: 95 % | RESPIRATION RATE: 20 BRPM | DIASTOLIC BLOOD PRESSURE: 69 MMHG | SYSTOLIC BLOOD PRESSURE: 122 MMHG | HEART RATE: 70 BPM

## 2025-04-25 DIAGNOSIS — R11.0 NAUSEA: ICD-10-CM

## 2025-04-25 DIAGNOSIS — K21.9 GASTROESOPHAGEAL REFLUX DISEASE, UNSPECIFIED WHETHER ESOPHAGITIS PRESENT: ICD-10-CM

## 2025-04-25 DIAGNOSIS — R10.9 RIGHT SIDED ABDOMINAL PAIN: ICD-10-CM

## 2025-04-25 DIAGNOSIS — R19.4 CHANGE IN BOWEL HABIT: ICD-10-CM

## 2025-04-25 PROCEDURE — 45380 COLONOSCOPY AND BIOPSY: CPT | Performed by: INTERNAL MEDICINE

## 2025-04-25 PROCEDURE — 88305 TISSUE EXAM BY PATHOLOGIST: CPT | Performed by: PATHOLOGY

## 2025-04-25 PROCEDURE — 45385 COLONOSCOPY W/LESION REMOVAL: CPT | Performed by: INTERNAL MEDICINE

## 2025-04-25 PROCEDURE — 43239 EGD BIOPSY SINGLE/MULTIPLE: CPT | Performed by: INTERNAL MEDICINE

## 2025-04-25 RX ORDER — MIDAZOLAM HYDROCHLORIDE 2 MG/2ML
INJECTION, SOLUTION INTRAMUSCULAR; INTRAVENOUS AS NEEDED
Status: DISCONTINUED | OUTPATIENT
Start: 2025-04-25 | End: 2025-04-25

## 2025-04-25 RX ORDER — SODIUM CHLORIDE, SODIUM LACTATE, POTASSIUM CHLORIDE, CALCIUM CHLORIDE 600; 310; 30; 20 MG/100ML; MG/100ML; MG/100ML; MG/100ML
INJECTION, SOLUTION INTRAVENOUS CONTINUOUS PRN
Status: DISCONTINUED | OUTPATIENT
Start: 2025-04-25 | End: 2025-04-25

## 2025-04-25 RX ORDER — PROPOFOL 10 MG/ML
INJECTION, EMULSION INTRAVENOUS AS NEEDED
Status: DISCONTINUED | OUTPATIENT
Start: 2025-04-25 | End: 2025-04-25

## 2025-04-25 RX ORDER — SODIUM CHLORIDE, SODIUM LACTATE, POTASSIUM CHLORIDE, CALCIUM CHLORIDE 600; 310; 30; 20 MG/100ML; MG/100ML; MG/100ML; MG/100ML
125 INJECTION, SOLUTION INTRAVENOUS CONTINUOUS
Status: DISCONTINUED | OUTPATIENT
Start: 2025-04-25 | End: 2025-04-29 | Stop reason: HOSPADM

## 2025-04-25 RX ORDER — PROPOFOL 10 MG/ML
INJECTION, EMULSION INTRAVENOUS CONTINUOUS PRN
Status: DISCONTINUED | OUTPATIENT
Start: 2025-04-25 | End: 2025-04-25

## 2025-04-25 RX ADMIN — SODIUM CHLORIDE, SODIUM LACTATE, POTASSIUM CHLORIDE, AND CALCIUM CHLORIDE: .6; .31; .03; .02 INJECTION, SOLUTION INTRAVENOUS at 11:50

## 2025-04-25 RX ADMIN — PROPOFOL 150 MG: 10 INJECTION, EMULSION INTRAVENOUS at 11:55

## 2025-04-25 RX ADMIN — PROPOFOL 140 MCG/KG/MIN: 10 INJECTION, EMULSION INTRAVENOUS at 11:56

## 2025-04-25 RX ADMIN — MIDAZOLAM 2 MG: 1 INJECTION INTRAMUSCULAR; INTRAVENOUS at 11:54

## 2025-04-25 NOTE — INTERVAL H&P NOTE
H&P reviewed. After examining the patient I find no changes in the patients condition since the H&P had been written.    Vitals:    04/25/25 1119   BP: 112/70   Pulse: 95   Resp: 20   Temp: 97.5 °F (36.4 °C)   SpO2: 99%

## 2025-04-25 NOTE — ANESTHESIA PREPROCEDURE EVALUATION
Procedure:  COLONOSCOPY  EGD    Relevant Problems   ANESTHESIA  Wakes up rough      CARDIO (within normal limits)   (+) Migraine without aura and without status migrainosus, not intractable      GI/HEPATIC   (+) Gastroesophageal reflux disease      NEURO/PSYCH   (+) Anxiety   (+) Major depressive disorder   (+) Migraine without aura and without status migrainosus, not intractable   (+) Other seizures (HCC)   (+) Posttraumatic stress disorder      PULMONARY (within normal limits)        Physical Exam    Airway    Mallampati score: I  TM Distance: >3 FB  Neck ROM: full     Dental   No notable dental hx     Cardiovascular  Cardiovascular exam normal    Pulmonary  Pulmonary exam normal     Other Findings  post-pubertal.      Anesthesia Plan  ASA Score- 2     Anesthesia Type- IV sedation with anesthesia with ASA Monitors.         Additional Monitors:     Airway Plan:            Plan Factors-    Chart reviewed.    Patient summary reviewed.                  Induction- intravenous.    Postoperative Plan-         Informed Consent- Anesthetic plan and risks discussed with patient.        NPO Status:  No vitals data found for the desired time range.

## 2025-04-25 NOTE — ANESTHESIA POSTPROCEDURE EVALUATION
Post-Op Assessment Note    CV Status:  Stable  Pain Score: 0    Pain management: adequate       Mental Status:  Alert   Hydration Status:  Stable   PONV Controlled:  None   Airway Patency:  Patent     Post Op Vitals Reviewed: Yes    No anethesia notable event occurred.    Staff: CRNA           Last Filed PACU Vitals:  Vitals Value Taken Time   Temp     Pulse     BP     Resp     SpO2

## 2025-04-28 ENCOUNTER — NURSE TRIAGE (OUTPATIENT)
Age: 25
End: 2025-04-28

## 2025-04-28 DIAGNOSIS — R10.84 GENERALIZED ABDOMINAL PAIN: Primary | ICD-10-CM

## 2025-04-28 RX ORDER — DICYCLOMINE HYDROCHLORIDE 10 MG/1
10 CAPSULE ORAL
Qty: 40 CAPSULE | Refills: 1 | Status: SHIPPED | OUTPATIENT
Start: 2025-04-28 | End: 2025-04-30

## 2025-04-28 NOTE — TELEPHONE ENCOUNTER
"FOLLOW UP: please advise     REASON FOR CONVERSATION: EGD    SYMPTOMS: abdominal spams constant but worse after eating. Started after procedures on 4/25. Nausea and a little bloating   Passing gas   OTHER: does not think she has a fever but did have some chills yesterday   ED precautions provided     DISPOSITION: Send Message to Task Provider Pool      Reason for Disposition   [1] Caller has URGENT question or concern AND [2] triager unable to answer question    Answer Assessment - Initial Assessment Questions  1. DATE/TIME: \"When did you have your endoscopy?\"       4/25  2. MAIN CONCERN: \"What is your main concern right now?\" \"What questions do you have?\"      Upper abdominal spasms- constant but worse after eating   3. ADOMINAL PAIN: \"Are you having any abdominal (belly or stomach) pain?\" If Yes, ask: \"How bad is it?\" (e.g., Scale 1-10; mild, moderate, severe).     - MILD (1-3): doesn't interfere with normal activities, abdomen soft and not tender to touch      - MODERATE (4-7): interferes with normal activities or awakens from sleep, tender to touch      - SEVERE (8-10): excruciating pain, doubled over, unable to do any normal activities        Varies constant at 5/10 but at its worse can but 8/10   4. OTHER SYMPTOMS: \"What other symptoms are you having?\" (e.g., breathing or swallowing problems, chest pain, throat pain, hoarseness, vomiting, stomach pain, bloating, fever, dizziness)      Nausea  A little bloating   5. ONSET: \"When did your symptoms start?\"      Started after EGD/ colonoscopy   6. PATTERN: \"Is the symptom(s) constant or does it come and go?\" \"Is your symptom(s) getting worse, better, or staying the same?\"      Same    Protocols used: GI-Endoscopy (Upper GI) Symptoms and Questions-ADULT-[place]    "

## 2025-04-28 NOTE — TELEPHONE ENCOUNTER
Spoke with patient, reviewed provider recommendations. I reviewed SE of dicyclomine as well.   Patient verbalized understanding, no further questions.

## 2025-04-29 ENCOUNTER — APPOINTMENT (OUTPATIENT)
Age: 25
End: 2025-04-29
Payer: MEDICARE

## 2025-04-29 DIAGNOSIS — R19.7 DIARRHEA, UNSPECIFIED TYPE: ICD-10-CM

## 2025-04-29 PROCEDURE — 87329 GIARDIA AG IA: CPT

## 2025-04-29 PROCEDURE — 88305 TISSUE EXAM BY PATHOLOGIST: CPT | Performed by: PATHOLOGY

## 2025-04-29 PROCEDURE — 83993 ASSAY FOR CALPROTECTIN FECAL: CPT

## 2025-04-29 PROCEDURE — 87209 SMEAR COMPLEX STAIN: CPT

## 2025-04-29 PROCEDURE — 87177 OVA AND PARASITES SMEARS: CPT

## 2025-04-30 ENCOUNTER — HOSPITAL ENCOUNTER (INPATIENT)
Facility: HOSPITAL | Age: 25
LOS: 2 days | Discharge: HOME | End: 2025-05-03
Attending: EMERGENCY MEDICINE | Admitting: STUDENT IN AN ORGANIZED HEALTH CARE EDUCATION/TRAINING PROGRAM
Payer: MEDICARE

## 2025-04-30 ENCOUNTER — APPOINTMENT (EMERGENCY)
Dept: CT IMAGING | Facility: HOSPITAL | Age: 25
End: 2025-04-30
Payer: MEDICARE

## 2025-04-30 DIAGNOSIS — R79.89 ELEVATED LFTS: ICD-10-CM

## 2025-04-30 DIAGNOSIS — R10.84 GENERALIZED ABDOMINAL PAIN: ICD-10-CM

## 2025-04-30 DIAGNOSIS — R11.0 NAUSEA: ICD-10-CM

## 2025-04-30 DIAGNOSIS — R10.11 RIGHT UPPER QUADRANT ABDOMINAL PAIN: Primary | ICD-10-CM

## 2025-04-30 LAB
ALBUMIN SERPL BCG-MCNC: 4.3 G/DL (ref 3.5–5)
ALP SERPL-CCNC: 103 U/L (ref 34–104)
ALT SERPL W P-5'-P-CCNC: 130 U/L (ref 7–52)
ANION GAP SERPL CALCULATED.3IONS-SCNC: 8 MMOL/L (ref 4–13)
APTT PPP: 21 SECONDS (ref 23–34)
AST SERPL W P-5'-P-CCNC: 66 U/L (ref 13–39)
ATRIAL RATE: 99 BPM
BASOPHILS # BLD AUTO: 0.05 THOUSANDS/ÂΜL (ref 0–0.1)
BASOPHILS NFR BLD AUTO: 0 % (ref 0–1)
BILIRUB DIRECT SERPL-MCNC: 0.05 MG/DL (ref 0–0.2)
BILIRUB SERPL-MCNC: 0.36 MG/DL (ref 0.2–1)
BUN SERPL-MCNC: 8 MG/DL (ref 5–25)
CALCIUM SERPL-MCNC: 9.8 MG/DL (ref 8.4–10.2)
CALPROTECTIN STL-MCNC: 66.9 ÂΜG/G
CARDIAC TROPONIN I PNL SERPL HS: <2 NG/L (ref ?–50)
CHLORIDE SERPL-SCNC: 105 MMOL/L (ref 96–108)
CO2 SERPL-SCNC: 25 MMOL/L (ref 21–32)
CREAT SERPL-MCNC: 0.69 MG/DL (ref 0.6–1.3)
EOSINOPHIL # BLD AUTO: 0.16 THOUSAND/ÂΜL (ref 0–0.61)
EOSINOPHIL NFR BLD AUTO: 1 % (ref 0–6)
ERYTHROCYTE [DISTWIDTH] IN BLOOD BY AUTOMATED COUNT: 13.2 % (ref 11.6–15.1)
G LAMBLIA AG STL QL IA: NEGATIVE
GFR SERPL CREATININE-BSD FRML MDRD: 123 ML/MIN/1.73SQ M
GGT SERPL-CCNC: 114 U/L (ref 9–64)
GLUCOSE SERPL-MCNC: 85 MG/DL (ref 65–140)
HCT VFR BLD AUTO: 40.4 % (ref 34.8–46.1)
HGB BLD-MCNC: 13.8 G/DL (ref 11.5–15.4)
IMM GRANULOCYTES # BLD AUTO: 0.06 THOUSAND/UL (ref 0–0.2)
IMM GRANULOCYTES NFR BLD AUTO: 1 % (ref 0–2)
INR PPP: 1.02 (ref 0.85–1.19)
LACTATE SERPL-SCNC: 1 MMOL/L (ref 0.5–2)
LDH SERPL-CCNC: 219 U/L (ref 140–271)
LIPASE SERPL-CCNC: 11 U/L (ref 11–82)
LYMPHOCYTES # BLD AUTO: 2.15 THOUSANDS/ÂΜL (ref 0.6–4.47)
LYMPHOCYTES NFR BLD AUTO: 19 % (ref 14–44)
MAGNESIUM SERPL-MCNC: 1.8 MG/DL (ref 1.9–2.7)
MCH RBC QN AUTO: 28.4 PG (ref 26.8–34.3)
MCHC RBC AUTO-ENTMCNC: 34.2 G/DL (ref 31.4–37.4)
MCV RBC AUTO: 83 FL (ref 82–98)
MONOCYTES # BLD AUTO: 0.5 THOUSAND/ÂΜL (ref 0.17–1.22)
MONOCYTES NFR BLD AUTO: 5 % (ref 4–12)
NEUTROPHILS # BLD AUTO: 8.3 THOUSANDS/ÂΜL (ref 1.85–7.62)
NEUTS SEG NFR BLD AUTO: 74 % (ref 43–75)
NRBC BLD AUTO-RTO: 0 /100 WBCS
P AXIS: 66 DEGREES
PLATELET # BLD AUTO: 326 THOUSANDS/UL (ref 149–390)
PMV BLD AUTO: 9.5 FL (ref 8.9–12.7)
POTASSIUM SERPL-SCNC: 4 MMOL/L (ref 3.5–5.3)
PR INTERVAL: 146 MS
PROCALCITONIN SERPL-MCNC: <0.05 NG/ML
PROT SERPL-MCNC: 7.5 G/DL (ref 6.4–8.4)
PROTHROMBIN TIME: 13.6 SECONDS (ref 12.3–15)
QRS AXIS: 81 DEGREES
QRSD INTERVAL: 100 MS
QT INTERVAL: 334 MS
QTC INTERVAL: 428 MS
RBC # BLD AUTO: 4.86 MILLION/UL (ref 3.81–5.12)
SODIUM SERPL-SCNC: 138 MMOL/L (ref 135–147)
T WAVE AXIS: 46 DEGREES
VENTRICULAR RATE: 99 BPM
WBC # BLD AUTO: 11.22 THOUSAND/UL (ref 4.31–10.16)

## 2025-04-30 PROCEDURE — 71260 CT THORAX DX C+: CPT

## 2025-04-30 PROCEDURE — 99285 EMERGENCY DEPT VISIT HI MDM: CPT | Performed by: EMERGENCY MEDICINE

## 2025-04-30 PROCEDURE — 93010 ELECTROCARDIOGRAM REPORT: CPT | Performed by: INTERNAL MEDICINE

## 2025-04-30 PROCEDURE — 96375 TX/PRO/DX INJ NEW DRUG ADDON: CPT

## 2025-04-30 PROCEDURE — 84145 PROCALCITONIN (PCT): CPT | Performed by: EMERGENCY MEDICINE

## 2025-04-30 PROCEDURE — 96361 HYDRATE IV INFUSION ADD-ON: CPT

## 2025-04-30 PROCEDURE — 96374 THER/PROPH/DIAG INJ IV PUSH: CPT

## 2025-04-30 PROCEDURE — 84484 ASSAY OF TROPONIN QUANT: CPT | Performed by: EMERGENCY MEDICINE

## 2025-04-30 PROCEDURE — 99284 EMERGENCY DEPT VISIT MOD MDM: CPT

## 2025-04-30 PROCEDURE — 86663 EPSTEIN-BARR ANTIBODY: CPT | Performed by: EMERGENCY MEDICINE

## 2025-04-30 PROCEDURE — 85730 THROMBOPLASTIN TIME PARTIAL: CPT | Performed by: EMERGENCY MEDICINE

## 2025-04-30 PROCEDURE — 82248 BILIRUBIN DIRECT: CPT | Performed by: EMERGENCY MEDICINE

## 2025-04-30 PROCEDURE — 36415 COLL VENOUS BLD VENIPUNCTURE: CPT | Performed by: EMERGENCY MEDICINE

## 2025-04-30 PROCEDURE — 85610 PROTHROMBIN TIME: CPT | Performed by: EMERGENCY MEDICINE

## 2025-04-30 PROCEDURE — 99223 1ST HOSP IP/OBS HIGH 75: CPT | Performed by: HOSPITALIST

## 2025-04-30 PROCEDURE — 86665 EPSTEIN-BARR CAPSID VCA: CPT | Performed by: EMERGENCY MEDICINE

## 2025-04-30 PROCEDURE — 93005 ELECTROCARDIOGRAM TRACING: CPT

## 2025-04-30 PROCEDURE — 80053 COMPREHEN METABOLIC PANEL: CPT | Performed by: EMERGENCY MEDICINE

## 2025-04-30 PROCEDURE — 74177 CT ABD & PELVIS W/CONTRAST: CPT

## 2025-04-30 PROCEDURE — 82977 ASSAY OF GGT: CPT | Performed by: EMERGENCY MEDICINE

## 2025-04-30 PROCEDURE — 83615 LACTATE (LD) (LDH) ENZYME: CPT | Performed by: EMERGENCY MEDICINE

## 2025-04-30 PROCEDURE — 86664 EPSTEIN-BARR NUCLEAR ANTIGEN: CPT | Performed by: EMERGENCY MEDICINE

## 2025-04-30 PROCEDURE — 80074 ACUTE HEPATITIS PANEL: CPT | Performed by: EMERGENCY MEDICINE

## 2025-04-30 PROCEDURE — 83605 ASSAY OF LACTIC ACID: CPT | Performed by: EMERGENCY MEDICINE

## 2025-04-30 PROCEDURE — 83690 ASSAY OF LIPASE: CPT | Performed by: EMERGENCY MEDICINE

## 2025-04-30 PROCEDURE — 85025 COMPLETE CBC W/AUTO DIFF WBC: CPT | Performed by: EMERGENCY MEDICINE

## 2025-04-30 PROCEDURE — 83735 ASSAY OF MAGNESIUM: CPT | Performed by: EMERGENCY MEDICINE

## 2025-04-30 RX ORDER — PANTOPRAZOLE SODIUM 40 MG/1
40 TABLET, DELAYED RELEASE ORAL
Status: DISCONTINUED | OUTPATIENT
Start: 2025-05-01 | End: 2025-05-03 | Stop reason: HOSPADM

## 2025-04-30 RX ORDER — FENTANYL CITRATE 50 UG/ML
50 INJECTION, SOLUTION INTRAMUSCULAR; INTRAVENOUS
Refills: 0 | Status: DISCONTINUED | OUTPATIENT
Start: 2025-04-30 | End: 2025-04-30

## 2025-04-30 RX ORDER — HYDROCODONE BITARTRATE AND ACETAMINOPHEN 5; 325 MG/1; MG/1
1 TABLET ORAL EVERY 6 HOURS PRN
Refills: 0 | Status: DISCONTINUED | OUTPATIENT
Start: 2025-04-30 | End: 2025-05-03 | Stop reason: HOSPADM

## 2025-04-30 RX ORDER — DICYCLOMINE HYDROCHLORIDE 10 MG/1
10 CAPSULE ORAL
Status: DISCONTINUED | OUTPATIENT
Start: 2025-04-30 | End: 2025-05-01

## 2025-04-30 RX ORDER — NICOTINE 21 MG/24HR
1 PATCH, TRANSDERMAL 24 HOURS TRANSDERMAL DAILY
Status: DISCONTINUED | OUTPATIENT
Start: 2025-05-01 | End: 2025-05-03 | Stop reason: HOSPADM

## 2025-04-30 RX ORDER — OXYCODONE HYDROCHLORIDE 10 MG/1
10 TABLET ORAL EVERY 4 HOURS PRN
Refills: 0 | Status: DISCONTINUED | OUTPATIENT
Start: 2025-04-30 | End: 2025-05-03 | Stop reason: HOSPADM

## 2025-04-30 RX ORDER — ONDANSETRON 2 MG/ML
4 INJECTION INTRAMUSCULAR; INTRAVENOUS EVERY 4 HOURS PRN
Status: DISCONTINUED | OUTPATIENT
Start: 2025-04-30 | End: 2025-05-03 | Stop reason: HOSPADM

## 2025-04-30 RX ORDER — KETOROLAC TROMETHAMINE 30 MG/ML
30 INJECTION, SOLUTION INTRAMUSCULAR; INTRAVENOUS ONCE
Status: COMPLETED | OUTPATIENT
Start: 2025-04-30 | End: 2025-04-30

## 2025-04-30 RX ORDER — HYOSCYAMINE SULFATE 0.12 MG/1
0.12 TABLET SUBLINGUAL ONCE
Status: COMPLETED | OUTPATIENT
Start: 2025-04-30 | End: 2025-04-30

## 2025-04-30 RX ORDER — ONDANSETRON 2 MG/ML
4 INJECTION INTRAMUSCULAR; INTRAVENOUS ONCE
Status: COMPLETED | OUTPATIENT
Start: 2025-04-30 | End: 2025-04-30

## 2025-04-30 RX ORDER — SODIUM CHLORIDE 9 MG/ML
75 INJECTION, SOLUTION INTRAVENOUS CONTINUOUS
Status: DISCONTINUED | OUTPATIENT
Start: 2025-04-30 | End: 2025-05-03 | Stop reason: HOSPADM

## 2025-04-30 RX ORDER — DICYCLOMINE HCL 20 MG
20 TABLET ORAL ONCE
Status: COMPLETED | OUTPATIENT
Start: 2025-04-30 | End: 2025-04-30

## 2025-04-30 RX ORDER — DICYCLOMINE HYDROCHLORIDE 10 MG/1
CAPSULE ORAL
Qty: 360 CAPSULE | Refills: 1 | Status: SHIPPED | OUTPATIENT
Start: 2025-04-30

## 2025-04-30 RX ORDER — NORTRIPTYLINE HYDROCHLORIDE 50 MG/1
50 CAPSULE ORAL 2 TIMES DAILY
Status: DISCONTINUED | OUTPATIENT
Start: 2025-04-30 | End: 2025-05-03 | Stop reason: HOSPADM

## 2025-04-30 RX ADMIN — KETOROLAC TROMETHAMINE 30 MG: 30 INJECTION, SOLUTION INTRAMUSCULAR; INTRAVENOUS at 11:58

## 2025-04-30 RX ADMIN — HYDROCODONE BITARTRATE AND ACETAMINOPHEN 1 TABLET: 5; 325 TABLET ORAL at 21:10

## 2025-04-30 RX ADMIN — SODIUM CHLORIDE 1000 ML: 0.9 INJECTION, SOLUTION INTRAVENOUS at 11:52

## 2025-04-30 RX ADMIN — DICYCLOMINE HYDROCHLORIDE 10 MG: 10 CAPSULE ORAL at 21:26

## 2025-04-30 RX ADMIN — NORTRIPTYLINE HYDROCHLORIDE 50 MG: 50 CAPSULE ORAL at 21:26

## 2025-04-30 RX ADMIN — ONDANSETRON 4 MG: 2 INJECTION INTRAMUSCULAR; INTRAVENOUS at 11:58

## 2025-04-30 RX ADMIN — DICYCLOMINE HYDROCHLORIDE 20 MG: 20 TABLET ORAL at 14:58

## 2025-04-30 RX ADMIN — ONDANSETRON 4 MG: 2 INJECTION INTRAMUSCULAR; INTRAVENOUS at 21:10

## 2025-04-30 RX ADMIN — IOHEXOL 100 ML: 350 INJECTION, SOLUTION INTRAVENOUS at 12:55

## 2025-04-30 RX ADMIN — HYOSCYAMINE SULFATE 0.12 MG: 0.12 TABLET SUBLINGUAL at 14:59

## 2025-04-30 RX ADMIN — SODIUM CHLORIDE 75 ML/HR: 0.9 INJECTION, SOLUTION INTRAVENOUS at 21:16

## 2025-04-30 NOTE — CONSULTS
Consultation - Gastroenterology   Name: Alexis Jules 24 y.o. female I MRN: [id number]  Unit/Bed#: ED-26 I Date of Admission: 4/30/2025   Date of Service: 4/30/2025 I Hospital Day: 0     Inpatient consult to gastroenterology  Consult performed by: Sanchez Edge MD  Consult ordered by: Yu Crocker DO        Physician Requesting Evaluation: Yu Crocker DO   Reason for Evaluation / Principal Problem: RUQ pain    Assessment & Plan  Right upper quadrant abdominal pain   - etiology unclear. Labs, imaging and recent EGD unremarkable. DDx includes IBS, gastroparesis, hepatitis, viral enteritis, thrombosis, adhesive disease, post-CCY syndrome, abdominal migraine, etc.   - supportive care with antiemetics, non-opiate analgesia, bentyl, simethicone   - will expedite OV for 1-2 months after discharge   - consider gastric emptying study as outpatient.   - c/w home TCA and PPI  Elevated LFTs   - ALT > AST and mild ALP elevation since 02/28/25. Serologic workup previously negative for celiac, HCV, HBV (non-immune). 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 Negative acute EBV and hep panel   - serologic w/u sent   - hepatology office msged to arrange for f/u appt      History of Present Illness   HPI:  Alexis Jules is a 24 y.o. female w/ a PMH of MDD/VIPIN, OCD, PTSD, POTS, migraines, endometriosis s/p MARIANNE 01/2025, symptomatic cholelithiasis s/p ERCP and CCY 2023 who presents for abd pain and poor PO intake.    She reports her symptoms began after her procedures.   04/25/25 EGD/colon: eso wnl, gastritis (Bx neg), duo normal (Bx neg). 1x transverse TA cold snared. Normal TI and R colon Bx. L colon Bx showing mild focal acute colitis. Fecal manda neg    She reports that she gets significant epigastric squeezing sensation after eating or even taking pills. She reports water is okay however. She denies any fevers or chills. She does also have nausea intermittently. She mentioned these symptoms to the office and  they recommended for her to come to the ED for further eval.    She reports a FHx of alc/MASH cirrhosis in her uncle. He is s/p OLT. She also reports that the remainder of her family is morbidly obese.    She rarely drinks. She otherwise vapes. She also smokes marijuana at least daily for the past > 10y.    Labs here: neg lipase and lactate, mild leukocytosis to 11, elevated ALT > AST. CTAP unremarkable    In the ED was given bentyl, levsin, toradol and zofran. Only the zofran helped    03/18/23 [facility]   - ampullitis with superficial ulcerations, sphincterotomy, CBD swept, PD and CBD stent placed    03/20/23 uncomplicated lap CCY, findings c/f chronic cholecystitis. Path showed acute on chronic cholecystitis    03/21/23 EGD: PD stent removed    10/23/23 EGD: mild gastritis (Bx neg), CBD stent removed    Review of Systems   Constitutional:  Negative for appetite change, chills, fatigue and fever.   Eyes:  Negative for photophobia.   Respiratory:  Negative for cough and shortness of breath.    Cardiovascular:  Negative for chest pain.   Gastrointestinal:  Positive for nausea and vomiting. Negative for abdominal pain, constipation and diarrhea.   Endocrine: Negative.    Genitourinary:  Negative for dysuria and frequency.   Musculoskeletal:  Negative for myalgias.   Skin:  Negative for pallor.   Neurological:  Negative for weakness.   Hematological: Negative.    Psychiatric/Behavioral: Negative.       Medical History Review: I have reviewed the patient's PMH, PSH, Social History, Family History, Meds, and Allergies     Objective :  Temp:  [98.3 °F (36.8 °C)] 98.3 °F (36.8 °C)  HR:  [114] 114  BP: (140)/(82) 140/82  Resp:  [16] 16  SpO2:  [98 %] 98 %  O2 Device: None (Room air)    Physical Exam  Constitutional:       General: She is not in acute distress.     Appearance: Normal appearance.   HENT:      Head: Normocephalic and atraumatic.      Nose: Nose normal.      Mouth/Throat:      Mouth: Mucous membranes  are moist.   Eyes:      General: No scleral icterus.     Extraocular Movements: Extraocular movements intact.      Conjunctiva/sclera: Conjunctivae normal.      Pupils: Pupils are equal, round, and reactive to light.   Cardiovascular:      Rate and Rhythm: Normal rate and regular rhythm.   Pulmonary:      Effort: Pulmonary effort is normal.   Abdominal:      General: Abdomen is flat. There is no distension.      Palpations: There is no mass.      Tenderness: There is abdominal tenderness in the right upper quadrant.   Musculoskeletal:         General: No swelling. Normal range of motion.   Skin:     General: Skin is warm and dry.      Capillary Refill: Capillary refill takes less than 2 seconds.   Neurological:      General: No focal deficit present.      Mental Status: She is alert.   Psychiatric:         Mood and Affect: Mood normal.       Result Review: I have reviewed all relevant labs, imaging and procedure notes/images.

## 2025-04-30 NOTE — PLAN OF CARE
Problem: PAIN - ADULT  Goal: Verbalizes/displays adequate comfort level or baseline comfort level  Description: Interventions:- Encourage patient to monitor pain and request assistance- Assess pain using appropriate pain scale- Administer analgesics based on type and severity of pain and evaluate response- Implement non-pharmacological measures as appropriate and evaluate response- Consider cultural and social influences on pain and pain management- Notify physician/advanced practitioner if interventions unsuccessful or patient reports new pain  Outcome: Progressing     Problem: INFECTION - ADULT  Goal: Absence or prevention of progression during hospitalization  Description: INTERVENTIONS:- Assess and monitor for signs and symptoms of infection- Monitor lab/diagnostic results- Monitor all insertion sites, i.e. indwelling lines, tubes, and drains- Monitor endotracheal if appropriate and nasal secretions for changes in amount and color- [place] appropriate cooling/warming therapies per order- Administer medications as ordered- Instruct and encourage patient and family to use good hand hygiene technique- Identify and instruct in appropriate isolation precautions for identified infection/condition  Outcome: Progressing  Goal: Absence of fever/infection during neutropenic period  Description: INTERVENTIONS:- Monitor WBC  Outcome: Progressing     Problem: SAFETY ADULT  Goal: Patient will remain free of falls  Description: INTERVENTIONS:- Educate patient/family on patient safety including physical limitations- Instruct patient to call for assistance with activity - Consult OT/PT to assist with strengthening/mobility - Keep Call bell within reach- Keep bed low and locked with side rails adjusted as appropriate- Keep care items and personal belongings within reach- Initiate and maintain comfort rounds- Make Fall Risk Sign visible to staff- Offer Toileting every  Hours, in advance of need- Initiate/Maintain alarm- Obtain  necessary fall risk management equipment: - Apply yellow socks and bracelet for high fall risk patients- Consider moving patient to room near nurses station  Outcome: Progressing  Goal: Maintain or return to baseline ADL function  Description: INTERVENTIONS:-  Assess patient's ability to carry out ADLs; assess patient's baseline for ADL function and identify physical deficits which impact ability to perform ADLs (bathing, care of mouth/teeth, toileting, grooming, dressing, etc.)- Assess/evaluate cause of self-care deficits - Assess range of motion- Assess patient's mobility; develop plan if impaired- Assess patient's need for assistive devices and provide as appropriate- Encourage maximum independence but intervene and supervise when necessary- Involve family in performance of ADLs- Assess for home care needs following discharge - Consider OT consult to assist with ADL evaluation and planning for discharge- Provide patient education as appropriate  Outcome: Progressing  Goal: Maintains/Returns to pre admission functional level  Description: INTERVENTIONS:- Perform AM-PAC 6 Click Basic Mobility/ Daily Activity assessment daily.- Set and communicate daily mobility goal to care team and patient/family/caregiver. - Collaborate with rehabilitation services on mobility goals if consulted- Perform Range of Motion  times a day.- Reposition patient every  hours.- Dangle patient  times a day- Stand patient  times a day- Ambulate patient  times a day- Out of bed to chair  times a day - Out of bed for meals  times a day- Out of bed for toileting- Record patient progress and toleration of activity level   Outcome: Progressing     Problem: DISCHARGE PLANNING  Goal: Discharge to home or other facility with appropriate resources  Description: INTERVENTIONS:- Identify barriers to discharge w/patient and caregiver- Arrange for needed discharge resources and transportation as appropriate- Identify discharge learning needs (meds, wound  care, etc.)- Arrange for interpretive services to assist at discharge as needed- Refer to Case Management Department for coordinating discharge planning if the patient needs post-hospital services based on physician/advanced practitioner order or complex needs related to functional status, cognitive ability, or social support system  Outcome: Progressing     Problem: Knowledge Deficit  Goal: Patient/family/caregiver demonstrates understanding of disease process, treatment plan, medications, and discharge instructions  Description: Complete learning assessment and assess knowledge base.Interventions:- Provide teaching at level of understanding- Provide teaching via preferred learning methods  Outcome: Progressing

## 2025-04-30 NOTE — ASSESSMENT & PLAN NOTE
- ALT > AST and mild ALP elevation since 02/28/25. Serologic workup previously negative for celiac, HCV, HBV (non-immune). 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 Negative acute EBV and hep panel   - serologic w/u sent   - hepatology office msged to arrange for f/u appt

## 2025-04-30 NOTE — ED PROVIDER NOTES
Time reflects when diagnosis was documented in both MDM as applicable and the Disposition within this note       Time User Action Codes Description Comment    2025  3:31 PM BendYu ventura L Add [R10.11] Right upper quadrant abdominal pain     2025  3:31 PM Bendaudrey, Yu L Add [R11.0] Nausea     2025  3:31 PM Bendaudrey, Yu L Add [R79.89] Elevated LFTs           ED Disposition       ED Disposition   Admit    Condition   Stable    Date/Time     4:08 PM    Comment   Case was discussed with TAMMY and the patient's admission status was agreed to be Admission Status: observation status to the service of TAMMY .               Assessment & Plan       Medical Decision Making  Differential diagnosis includes but not limited to:  Appendicitis, viral syndrome, constipation, AMI, NSTEMI, pneumonia, pneuothorax, gerd, gastritis,  mesenteric ischemia, mesenteric adenitis, pancreatitis, cholecystitis, choledocholithiasis, hepatitis, bowel obstruction, ileus, gastroenteritis, colitis, malignancy, AAA, perforation, toxicologic poisoning, renal infarct, acute kidney injury, splenic infarct, splenic injury, nephrolithiasis, UTI, muscular strain, intra-abdominal hematoma, hernia, ovarian cyst, ovarian torsion, ectopic pregnancy, rectal prolapse, pain no rectal fistula, a no rectal fissures, hemorrhoids, perirectal abscess, threatened , PID, abruption, molar pregnancy, dislodged IUD, fibroid uterus, salpingitis, tubo-ovarian abscess, dysmenorrhea, cervicitis,    Will check labs to evaluate for electrolyte abnormality, KAYKAY, anemia, significant leukocytosis, pancreatitis, hepatitis and biliary etiology. Will also check urine for pregnancy and UTI     Amount and/or Complexity of Data Reviewed  Independent Historian:      Details:     Sister at bedside      External Data Reviewed: notes.     Details:   Chart review shows patient was initially seen by gastroenterology on .  Diagnosis of  "significant GERD with worsening symptoms.  Suspect \"bile salt induced diarrhea/postcholecystectomy irritable bowel\"    Patient did have a colonoscopy on April 25 which showed 1 polyp that was removed and otherwise unremarkable exam.  EGD also completed on same day which resulted erythematous mucosa consistent with gastritis as well as as biopsied      Labs: ordered. Decision-making details documented in ED Course.     Details:   Chronically elevated leukocytosis  No anemia or thrombocytopenia  No acute kidney injury electrolyte abnormalities  LFTs mildly elevated compared to prior  Lipase within normal  Lactic acid within normal  Procalcitonin within normal range  Troponin less than 2 with heart score less than 3      Radiology: ordered. Decision-making details documented in ED Course.     Details:   CT chest abdomen pelvis interpreted by radiologist as no acute findings  ECG/medicine tests: ordered and independent interpretation performed. Decision-making details documented in ED Course.     Details:     Sinus tachycardia however old show persistent tachycardia in the past at offices no ischemia or arrhythmia    Discussion of management or test interpretation with external provider(s): Dr Edge from GI    Risk  Prescription drug management.  Decision regarding hospitalization.        ED Course as of 04/30/25 1626   Wed Apr 30, 2025   1121 Patient is a very pleasant 24-year-old female coming in today with worsening abdominal pain and nausea since her EGD and colonoscopy 5 days ago.  On exam patient is resting in bed.  She does have diffuse abdominal tenderness without any guarding or rebound.  Will reach out to GI as well as start medical workup including CT    Disclosure: Voice to text software was used in the preparation of this document and could have resulted in translational errors.      Occasional wrong word or \"sound a like\" substitutions may have occurred due to the inherent limitations of voice recognition " "software.  Read the chart carefully and recognize, using context, where substitutions have occurred.       I have independently reviewed external records are available to me to the level of detail possible within the time constraints of my patient care responsibilities in the ED.       1137 Reached out to Dr Edge who is on call fro GI - no PO medications and agrees with work up       1207 CBC and differential(!)  WBC 11.22 - chronically elevated  Hemoglobin WNL       1207 Comprehensive metabolic panel(!)  WNL    LFT\"s mildly increased from prior       1208 Lipase  WNL       1208 Lactic acid, plasma (w/reflex if result > 2.0)  WNL       1224 Procalcitonin  WNL       1224 HS Troponin 0hr (reflex protocol)  <2 with HEART score <3       1301 CT chest abdomen pelvis w contrast  Pending results       1307 Patient resting in bed.  She states the nausea improved but overall she just does not feel well.  Updated on labs.  Well-appearing in no distress on my exam       1421 CT chest abdomen pelvis w contrast  WNL       1538 Patient, sister and I had lengthy conversation.  Patient still has symptoms despite medication.  I did review unfortunately that from an emergency room standpoint there is nothing emergent on her labs or CT findings.  I did discuss with her that regardless she still has underlying pathology that has not been identified and may need further evaluation with GI as well as hepatology.    Patient states that she is feeling frustrated that she know something is wrong to her right upper quadrant.  I did review her chart again and she reports that she recent has stool studies as well which I reviewed with her the only result was Giardia.  I will add on EBV, GGT, direct bili as well as hepatitis panel and as well as LD H.  I also discussed with gastroenterology who states that this is most likely functional.  He was in for evaluation and suggested admission.  Will place consult and reach out to " medicine                       Medications   fentaNYL injection 50 mcg (has no administration in time range)   sodium chloride 0.9 % bolus 1,000 mL (0 mL Intravenous Stopped 4/30/25 1248)   ketorolac (TORADOL) injection 30 mg (30 mg Intravenous Given 4/30/25 1158)   ondansetron (ZOFRAN) injection 4 mg (4 mg Intravenous Given 4/30/25 1158)   iohexol (OMNIPAQUE) 350 MG/ML injection (SINGLE-DOSE) 100 mL (100 mL Intravenous Given 4/30/25 1255)   hyoscyamine (LEVSIN/SL) SL tablet 0.125 mg (0.125 mg Sublingual Given 4/30/25 1459)   dicyclomine (BENTYL) tablet 20 mg (20 mg Oral Given 4/30/25 1458)       ED Risk Strat Scores   HEART Risk Score      Flowsheet Row Most Recent Value   Heart Score Risk Calculator    History 0 Filed at: 04/30/2025 1225   ECG 1 Filed at: 04/30/2025 1225   Age 0 Filed at: 04/30/2025 1225   Risk Factors 0 Filed at: 04/30/2025 1225   Troponin 0 Filed at: 04/30/2025 1225   HEART Score 1 Filed at: 04/30/2025 1225          HEART Risk Score      Flowsheet Row Most Recent Value   Heart Score Risk Calculator    History 0 Filed at: 04/30/2025 1225   ECG 1 Filed at: 04/30/2025 1225   Age 0 Filed at: 04/30/2025 1225   Risk Factors 0 Filed at: 04/30/2025 1225   Troponin 0 Filed at: 04/30/2025 1225   HEART Score 1 Filed at: 04/30/2025 1225                      No data recorded        SBIRT 20yo+      Flowsheet Row Most Recent Value   Initial Alcohol Screen: US AUDIT-C     1. How often do you have a drink containing alcohol? 0 Filed at: 04/30/2025 1056   2. How many drinks containing alcohol do you have on a typical day you are drinking?  0 Filed at: 04/30/2025 1056   3a. Male UNDER 65: How often do you have five or more drinks on one occasion? 0 Filed at: 04/30/2025 1056   3b. FEMALE Any Age, or MALE 65+: How often do you have 4 or more drinks on one occassion? 0 Filed at: 04/30/2025 1056   Audit-C Score 0 Filed at: 04/30/2025 1056   ANTHONY: How many times in the past year have you...    Used an illegal drug or  used a prescription medication for non-medical reasons? Never Filed at: 04/30/2025 1056                            History of Present Illness       Chief Complaint   Patient presents with    Abdominal Pain     Pt with mid abdominal pain radiating into her back with nausea x5 days. Pt had EGD and colonoscopy last Friday and symptoms began after. Pt states this morning she had low grade fever, 99.9. Pt contacted GI who advised she be seen in the ED.       Past Medical History:   Diagnosis Date    Endometriosis     Fibroids     Ovarian cyst     Seizures (HCC)     Non- epileptic    Tourette's       Past Surgical History:   Procedure Laterality Date    CHOLECYSTECTOMY  2022    CYSTOSCOPY N/A 1/3/2025    Procedure: CYSTOSCOPY;  Surgeon: Pallavi Yun MD;  Location: [facility];  Service: Gynecology    ENDOMETRIAL FULGURATION      HI LAPS TOTAL HYSTERECT 250 GM/< W/RMVL TUBE/OVARY N/A 1/3/2025    Procedure: TOTAL LAPAROSCOPIC HYSTERECTOMY, BILATERAL SALPINGECTOMY, EXAM UNDER ANESTHESIA;  Surgeon: Pallavi Yun MD;  Location: [facility];  Service: Gynecology    UPPER GASTROINTESTINAL ENDOSCOPY      WISDOM TOOTH EXTRACTION        Family History   Problem Relation Age of Onset    Thyroid disease Mother     Heart block Mother     Diabetes Mother     Heart block Father     Heart attack Father     Cancer Maternal Grandmother     Cirrhosis Maternal Uncle     Cancer Paternal Uncle       Social History     Tobacco Use    Smoking status: Some Days     Current packs/day: 1.00     Types: Cigarettes    Smokeless tobacco: Never   Vaping Use    Vaping status: Every Day    Substances: Nicotine, THC   Substance Use Topics    Drug use: Yes     Types: Marijuana     Comment: vapes and joint daily      E-Cigarette/Vaping    E-Cigarette Use Current Every Day User     Comments daily       E-Cigarette/Vaping Substances    Nicotine Yes     THC Yes     CBD No     Flavoring No     Other No     Unknown No       I have reviewed and  agree with the history as documented.     Patient is a 24-year-old female coming in today with abdominal pain and nausea for the past 5 days.  Patient states that she has a history of nonepileptic seizures, Tourette's, endometriosis, possible POTS where she is in the middle of the workup for this coming in for worsening symptoms  she her procedures.  Patient states her symptoms have been  progressively worsening over the past few days.  She is has diffuse abdominal cramping without any relief from medications and reports it is a squeezing epigastric region.  Tends to radiate up into her mid chest.  However it does not radiate into her neck, jaw, arm or back..  She does have pain that is worse with eating and/or drinking anything except water persistent nausea without any vomiting.  She states that the diarrhea she has had in the past but changed mildly after the colonoscopy where it is more frequent.  There is no melena or bright red blood per rectum.  She has low-grade temperatures as well 99.  She called GI referred her to the ER.  She states she has a dry cough but nothing productive.        Patient also reports that she just had a hysterectomy several months ago.  She reports that she has had elevated LFTs in the past and they tend to fluctuate but can no one figure out why.  She has also had several workups at [facility] as well where she also had a cholecystectomy after HIDA scan and MRCP.          History provided by:  Patient, medical records and relative   used: No    Abdominal Cramping  Pain location:  Epigastric  Pain quality: cramping    Pain radiates to:  Does not radiate  Pain severity:  Moderate  Onset quality:  Gradual  Duration:  5 days  Timing:  Constant  Progression:  Unchanged  Chronicity:  New  Context: not alcohol use, not awakening from sleep, not diet changes, not eating, not laxative use, not medication withdrawal, not previous surgeries, not recent illness, not recent  sexual activity, not recent travel, not retching, not sick contacts, not suspicious food intake and not trauma    Relieved by:  Nothing  Worsened by:  Nothing  Ineffective treatments:  None tried  Associated symptoms: anorexia, cough, diarrhea, fatigue, fever and nausea    Associated symptoms: no belching, no chest pain, no chills, no constipation, no dysuria, no flatus, no hematemesis, no hematochezia, no hematuria, no melena, no shortness of breath, no sore throat and no vomiting    Risk factors: no alcohol abuse, no aspirin use, not elderly, has not had multiple surgeries and no recent hospitalization        Review of Systems   Constitutional:  Positive for fatigue and fever. Negative for chills.   HENT: Negative.  Negative for ear pain and sore throat.    Eyes: Negative.  Negative for pain and visual disturbance.   Respiratory:  Positive for cough. Negative for shortness of breath.    Cardiovascular: Negative.  Negative for chest pain and palpitations.   Gastrointestinal:  Positive for anorexia, diarrhea and nausea. Negative for abdominal pain, constipation, flatus, hematemesis, hematochezia, melena and vomiting.   Endocrine: Negative.    Genitourinary: Negative.  Negative for dysuria and hematuria.   Musculoskeletal: Negative.  Negative for arthralgias and back pain.   Skin:  Negative for color change and rash.   Neurological: Negative.  Negative for seizures and syncope.   Hematological: Negative.    Psychiatric/Behavioral: Negative.     All other systems reviewed and are negative.          Objective       ED Triage Vitals [04/30/25 1059]   Temperature Pulse Blood Pressure Respirations SpO2 Patient Position - Orthostatic VS   98.3 °F (36.8 °C) (!) 114 140/82 16 98 % Sitting      Temp Source Heart Rate Source BP Location FiO2 (%) Pain Score    Oral Monitor Right arm -- 6      Vitals      Date and Time Temp Pulse SpO2 Resp BP Pain Score FACES Pain Rating User   04/30/25 1158 -- -- -- -- -- 6 -- AEN   04/30/25  1059 98.3 °F (36.8 °C) 114 98 % 16 140/82 6 -- AB            Physical Exam  Vitals and nursing note reviewed.   Constitutional:       General: She is awake. She is not in acute distress.     Appearance: Normal appearance. She is well-developed and overweight.   HENT:      Head: Normocephalic and atraumatic.      Mouth/Throat:      Lips: Pink.      Mouth: Mucous membranes are dry.   Eyes:      General: Lids are normal. Gaze aligned appropriately.      Extraocular Movements: Extraocular movements intact.      Conjunctiva/sclera: Conjunctivae normal.      Pupils: Pupils are equal, round, and reactive to light.   Neck:      Trachea: Trachea normal.   Cardiovascular:      Rate and Rhythm: Regular rhythm. Tachycardia present.      Pulses:           Radial pulses are 2+ on the right side and 2+ on the left side.        Dorsalis pedis pulses are 2+ on the right side and 2+ on the left side.      Heart sounds: Normal heart sounds, S1 normal and S2 normal. No murmur heard.  Pulmonary:      Effort: Pulmonary effort is normal. No respiratory distress.      Breath sounds: Normal breath sounds.   Abdominal:      General: Bowel sounds are normal.      Palpations: Abdomen is soft.      Tenderness: There is generalized abdominal tenderness. There is no guarding or rebound.   Musculoskeletal:         General: No swelling.      Cervical back: Normal range of motion and neck supple. No rigidity.      Right lower leg: No edema.      Left lower leg: No edema.   Lymphadenopathy:      Cervical: No cervical adenopathy.   Skin:     General: Skin is warm and dry.      Capillary Refill: Capillary refill takes less than 2 seconds.   Neurological:      General: No focal deficit present.      Mental Status: She is oriented to person, place, and time.      GCS: GCS eye subscore is 4. GCS verbal subscore is 5. GCS motor subscore is 6.      Cranial Nerves: Cranial nerves 2-12 are intact.      Sensory: Sensation is intact.      Motor: Motor function  is intact.      Coordination: Coordination is intact.   Psychiatric:         Mood and Affect: Mood normal.         Behavior: Behavior is cooperative.         Results Reviewed       Procedure Component Value Units Date/Time    EBV acute panel [[id number]] Collected: 04/30/25 1529    Lab Status: In process Specimen: Blood from Arm, Left Updated: 04/30/25 1533    Hepatitis panel, acute [[id number]] Collected: 04/30/25 1529    Lab Status: In process Specimen: Blood from Arm, Left Updated: 04/30/25 1533    Gamma GT [[id number]] Collected: 04/30/25 1529    Lab Status: In process Specimen: Blood from Arm, Left Updated: 04/30/25 1533    Bilirubin, direct [[id number]]     Lab Status: No result Specimen: Blood     LD,Blood [[id number]]     Lab Status: No result Specimen: Blood     Protime-INR [[id number]]  (Normal) Collected: 04/30/25 1401    Lab Status: Final result Specimen: Blood from Arm, Right Updated: 04/30/25 1420     Protime 13.6 seconds      INR 1.02    Narrative:      INR Therapeutic Range    Indication                                             INR Range      Atrial Fibrillation                                               2.0-3.0  Hypercoagulable State                                    2.0.2.3  Left Ventricular Asist Device                            2.0-3.0  Mechanical Heart Valve                                  -    Aortic(with afib, MI, embolism, HF, LA enlargement,    and/or coagulopathy)                                     2.0-3.0 (2.5-3.5)     Mitral                                                             2.5-3.5  Prosthetic/Bioprosthetic Heart Valve               2.0-3.0  Venous thromboembolism (VTE: VT, PE        2.0-3.0    APTT [[id number]]  (Abnormal) Collected: 04/30/25 1401    Lab Status: Final result Specimen: Blood from Arm, Right Updated: 04/30/25 1420     PTT 21 seconds     Procalcitonin [[id number]]  (Normal) Collected: 04/30/25 1142    Lab Status: Final result Specimen: Blood from Arm, Right  Updated: 04/30/25 1216     Procalcitonin <0.05 ng/ml     HS Troponin 0hr (reflex protocol) [[id number]]  (Normal) Collected: 04/30/25 1142    Lab Status: Final result Specimen: Blood from Arm, Right Updated: 04/30/25 1213     hs TnI 0hr <2 ng/L     Comprehensive metabolic panel [[id number]]  (Abnormal) Collected: 04/30/25 1142    Lab Status: Final result Specimen: Blood from Arm, Right Updated: 04/30/25 1207     Sodium 138 mmol/L      Potassium 4.0 mmol/L      Chloride 105 mmol/L      CO2 25 mmol/L      ANION GAP 8 mmol/L      BUN 8 mg/dL      Creatinine 0.69 mg/dL      Glucose 85 mg/dL      Calcium 9.8 mg/dL      AST 66 U/L       U/L      Alkaline Phosphatase 103 U/L      Total Protein 7.5 g/dL      Albumin 4.3 g/dL      Total Bilirubin 0.36 mg/dL      eGFR 123 ml/min/1.73sq m     Narrative:      National Kid[employer] Disease Foundation guidelines for Chronic Kidney Disease (CKD):     Stage 1 with normal or high GFR (GFR > 90 mL/min/1.73 square meters)    Stage 2 Mild CKD (GFR = 60-89 mL/min/1.73 square meters)    Stage 3A Moderate CKD (GFR = 45-59 mL/min/1.73 square meters)    Stage 3B Moderate CKD (GFR = 30-44 mL/min/1.73 square meters)    Stage 4 Severe CKD (GFR = 15-29 mL/min/1.73 square meters)    Stage 5 End Stage CKD (GFR <15 mL/min/1.73 square meters)  Note: GFR calculation is accurate only with a steady state creatinine    Lipase [[id number]]  (Normal) Collected: 04/30/25 1142    Lab Status: Final result Specimen: Blood from Arm, Right Updated: 04/30/25 1207     Lipase 11 u/L     Magnesium [[id number]]  (Abnormal) Collected: 04/30/25 1142    Lab Status: Final result Specimen: Blood from Arm, Right Updated: 04/30/25 1207     Magnesium 1.8 mg/dL     Lactic acid, plasma (w/reflex if result > 2.0) [[id number]]  (Normal) Collected: 04/30/25 1142    Lab Status: Final result Specimen: Blood from Arm, Right Updated: 04/30/25 1205     LACTIC ACID 1.0 mmol/L     Narrative:      Result may be elevated if tourniquet was  used during collection.    CBC and differential [[id number]]  (Abnormal) Collected: 04/30/25 1142    Lab Status: Final result Specimen: Blood from Arm, Right Updated: 04/30/25 1152     WBC 11.22 Thousand/uL      RBC 4.86 Million/uL      Hemoglobin 13.8 g/dL      Hematocrit 40.4 %      MCV 83 fL      MCH 28.4 pg      MCHC 34.2 g/dL      RDW 13.2 %      MPV 9.5 fL      Platelets 326 Thousands/uL      nRBC 0 /100 WBCs      Segmented % 74 %      Immature Grans % 1 %      Lymphocytes % 19 %      Monocytes % 5 %      Eosinophils Relative 1 %      Basophils Relative 0 %      Absolute Neutrophils 8.30 Thousands/µL      Absolute Immature Grans 0.06 Thousand/uL      Absolute Lymphocytes 2.15 Thousands/µL      Absolute Monocytes 0.50 Thousand/µL      Eosinophils Absolute 0.16 Thousand/µL      Basophils Absolute 0.05 Thousands/µL             CT chest abdomen pelvis w contrast   Final Interpretation by Sara Scott MD (04/30 1342)      No acute findings in the chest, abdomen or pelvis.               Workstation performed: [id number]             ECG 12 Lead Documentation Only    Date/Time: 4/30/2025 12:03 PM    Performed by: Yu Crocker DO  Authorized by: Yu Crocker DO    Indications / Diagnosis:  Epigastric pain  ECG reviewed by me, the ED Provider: yes    Patient location:  ED  Previous ECG:     Previous ECG comparison: unable to visualize old on 2/25.  Interpretation:     Interpretation: non-specific    Quality:     Tracing quality:  Limited by artifact  Rate:     ECG rate:  100    ECG rate assessment: tachycardic    Rhythm:     Rhythm: sinus tachycardia    Ectopy:     Ectopy: none    QRS:     QRS axis:  Normal    QRS intervals:  Normal  Conduction:     Conduction: normal    ST segments:     ST segments:  Non-specific  T waves:     T waves: non-specific    Comments:      QTC @ 428 ms  Normal axis          ED Medication and Procedure Management   Prior to Admission Medications   Prescriptions Last Dose Informant  Patient Reported? Taking?   Ubrelvy 100 MG tablet  Self Yes No   Sig: Take 100 mg by mouth daily as needed   dicyclomine (BENTYL) 10 mg capsule   No No   Sig: take 1 capsule by mouth four times a day before meals and at bedtime   nortriptyline (PAMELOR) 75 MG capsule  Self Yes No   Sig: Take 50 mg by mouth 2 (two) times a day   omeprazole (PriLOSEC) 40 MG capsule   No No   Sig: Take 1 capsule (40 mg total) by mouth daily before breakfast   ondansetron (Zofran) 4 mg tablet   No No   Sig: Take 1 tablet (4 mg total) by mouth every 8 (eight) hours as needed for nausea or vomiting   polyethylene glycol (GOLYTELY) 4000 mL solution   No No   Sig: Take 4,000 mL by mouth once for 1 dose Take as directed by office instructions prior to colonoscopy.      Facility-Administered Medications: None     Patient's Medications   Discharge Prescriptions    No medications on file     No discharge procedures on file.  ED SEPSIS DOCUMENTATION   Time reflects when diagnosis was documented in both MDM as applicable and the Disposition within this note       Time User Action Codes Description Comment    4/30/2025  3:31 PM Yu Crocker Add [R10.11] Right upper quadrant abdominal pain     4/30/2025  3:31 PM Yu Crocker Add [R11.0] Nausea     4/30/2025  3:31 PM Yu Crocker [R79.89] Elevated LFTs                  Yu Crocker DO  04/30/25 5553

## 2025-04-30 NOTE — TELEPHONE ENCOUNTER
Patient is still having stomach pain and running a low grade fever I advised her per message to go to ER patient is going to go to ED

## 2025-04-30 NOTE — ASSESSMENT & PLAN NOTE
- etiology unclear. Labs, imaging and recent EGD unremarkable. DDx includes IBS, gastroparesis, hepatitis, viral enteritis, thrombosis, adhesive disease, post-CCY syndrome, abdominal migraine, etc.   - supportive care with antiemetics, non-opiate analgesia, bentyl, simethicone   - will expedite OV for 1-2 months after discharge   - consider gastric emptying study as outpatient.   - c/w home TCA and PPI

## 2025-05-01 LAB
ALBUMIN SERPL BCG-MCNC: 3.5 G/DL (ref 3.5–5)
ALP SERPL-CCNC: 88 U/L (ref 34–104)
ALT SERPL W P-5'-P-CCNC: 97 U/L (ref 7–52)
ANION GAP SERPL CALCULATED.3IONS-SCNC: 7 MMOL/L (ref 4–13)
AST SERPL W P-5'-P-CCNC: 53 U/L (ref 13–39)
BASOPHILS # BLD AUTO: 0.04 THOUSANDS/ÂΜL (ref 0–0.1)
BASOPHILS NFR BLD AUTO: 0 % (ref 0–1)
BILIRUB SERPL-MCNC: 0.46 MG/DL (ref 0.2–1)
BUN SERPL-MCNC: 6 MG/DL (ref 5–25)
CALCIUM SERPL-MCNC: 8.7 MG/DL (ref 8.4–10.2)
CHLORIDE SERPL-SCNC: 107 MMOL/L (ref 96–108)
CO2 SERPL-SCNC: 24 MMOL/L (ref 21–32)
CREAT SERPL-MCNC: 0.63 MG/DL (ref 0.6–1.3)
EBV EA IGG SER QL IA: POSITIVE
EBV NA IGG SER QL IA: POSITIVE
EBV VCA IGG SER QL IA: POSITIVE
EBV VCA IGM SER QL IA: NEGATIVE
EOSINOPHIL # BLD AUTO: 0.22 THOUSAND/ÂΜL (ref 0–0.61)
EOSINOPHIL NFR BLD AUTO: 3 % (ref 0–6)
ERYTHROCYTE [DISTWIDTH] IN BLOOD BY AUTOMATED COUNT: 13.1 % (ref 11.6–15.1)
GFR SERPL CREATININE-BSD FRML MDRD: 126 ML/MIN/1.73SQ M
GLUCOSE SERPL-MCNC: 80 MG/DL (ref 65–140)
HAV IGM SER QL: NORMAL
HBV CORE IGM SER QL: NORMAL
HBV SURFACE AG SER QL: NORMAL
HCT VFR BLD AUTO: 38.5 % (ref 34.8–46.1)
HCV AB SER QL: NORMAL
HGB BLD-MCNC: 12.9 G/DL (ref 11.5–15.4)
IGA SERPL-MCNC: 144 MG/DL (ref 66–433)
IGG SERPL-MCNC: 805 MG/DL (ref 635–1741)
IGM SERPL-MCNC: 57 MG/DL (ref 45–281)
IMM GRANULOCYTES # BLD AUTO: 0.02 THOUSAND/UL (ref 0–0.2)
IMM GRANULOCYTES NFR BLD AUTO: 0 % (ref 0–2)
LYMPHOCYTES # BLD AUTO: 3.32 THOUSANDS/ÂΜL (ref 0.6–4.47)
LYMPHOCYTES NFR BLD AUTO: 37 % (ref 14–44)
MAGNESIUM SERPL-MCNC: 1.7 MG/DL (ref 1.9–2.7)
MCH RBC QN AUTO: 28.7 PG (ref 26.8–34.3)
MCHC RBC AUTO-ENTMCNC: 33.5 G/DL (ref 31.4–37.4)
MCV RBC AUTO: 86 FL (ref 82–98)
MONOCYTES # BLD AUTO: 0.6 THOUSAND/ÂΜL (ref 0.17–1.22)
MONOCYTES NFR BLD AUTO: 7 % (ref 4–12)
NEUTROPHILS # BLD AUTO: 4.72 THOUSANDS/ÂΜL (ref 1.85–7.62)
NEUTS SEG NFR BLD AUTO: 53 % (ref 43–75)
NRBC BLD AUTO-RTO: 0 /100 WBCS
PLATELET # BLD AUTO: 285 THOUSANDS/UL (ref 149–390)
PMV BLD AUTO: 9.6 FL (ref 8.9–12.7)
POTASSIUM SERPL-SCNC: 3.5 MMOL/L (ref 3.5–5.3)
PROT SERPL-MCNC: 6 G/DL (ref 6.4–8.4)
RBC # BLD AUTO: 4.49 MILLION/UL (ref 3.81–5.12)
SODIUM SERPL-SCNC: 138 MMOL/L (ref 135–147)
WBC # BLD AUTO: 8.92 THOUSAND/UL (ref 4.31–10.16)

## 2025-05-01 PROCEDURE — 82784 ASSAY IGA/IGD/IGG/IGM EACH: CPT | Performed by: STUDENT IN AN ORGANIZED HEALTH CARE EDUCATION/TRAINING PROGRAM

## 2025-05-01 PROCEDURE — 80053 COMPREHEN METABOLIC PANEL: CPT | Performed by: HOSPITALIST

## 2025-05-01 PROCEDURE — 99254 IP/OBS CNSLTJ NEW/EST MOD 60: CPT | Performed by: INTERNAL MEDICINE

## 2025-05-01 PROCEDURE — 85025 COMPLETE CBC W/AUTO DIFF WBC: CPT | Performed by: HOSPITALIST

## 2025-05-01 PROCEDURE — 99232 SBSQ HOSP IP/OBS MODERATE 35: CPT

## 2025-05-01 PROCEDURE — 83735 ASSAY OF MAGNESIUM: CPT | Performed by: HOSPITALIST

## 2025-05-01 RX ORDER — MAGNESIUM SULFATE HEPTAHYDRATE 40 MG/ML
4 INJECTION, SOLUTION INTRAVENOUS ONCE
Status: COMPLETED | OUTPATIENT
Start: 2025-05-01 | End: 2025-05-01

## 2025-05-01 RX ORDER — DICYCLOMINE HYDROCHLORIDE 10 MG/1
20 CAPSULE ORAL
Status: DISCONTINUED | OUTPATIENT
Start: 2025-05-01 | End: 2025-05-03 | Stop reason: HOSPADM

## 2025-05-01 RX ADMIN — OXYCODONE HYDROCHLORIDE 10 MG: 10 TABLET ORAL at 07:20

## 2025-05-01 RX ADMIN — DICYCLOMINE HYDROCHLORIDE 20 MG: 10 CAPSULE ORAL at 21:19

## 2025-05-01 RX ADMIN — DICYCLOMINE HYDROCHLORIDE 20 MG: 10 CAPSULE ORAL at 16:33

## 2025-05-01 RX ADMIN — NORTRIPTYLINE HYDROCHLORIDE 50 MG: 50 CAPSULE ORAL at 09:32

## 2025-05-01 RX ADMIN — PANTOPRAZOLE SODIUM 40 MG: 40 TABLET, DELAYED RELEASE ORAL at 05:05

## 2025-05-01 RX ADMIN — ONDANSETRON 4 MG: 2 INJECTION INTRAMUSCULAR; INTRAVENOUS at 19:01

## 2025-05-01 RX ADMIN — ONDANSETRON 4 MG: 2 INJECTION INTRAMUSCULAR; INTRAVENOUS at 07:20

## 2025-05-01 RX ADMIN — OXYCODONE HYDROCHLORIDE 10 MG: 10 TABLET ORAL at 21:19

## 2025-05-01 RX ADMIN — DICYCLOMINE HYDROCHLORIDE 20 MG: 10 CAPSULE ORAL at 11:15

## 2025-05-01 RX ADMIN — SODIUM CHLORIDE 75 ML/HR: 0.9 INJECTION, SOLUTION INTRAVENOUS at 16:33

## 2025-05-01 RX ADMIN — NICOTINE 1 PATCH: 14 PATCH, EXTENDED RELEASE TRANSDERMAL at 09:32

## 2025-05-01 RX ADMIN — MAGNESIUM SULFATE HEPTAHYDRATE 4 G: 40 INJECTION, SOLUTION INTRAVENOUS at 09:32

## 2025-05-01 RX ADMIN — NORTRIPTYLINE HYDROCHLORIDE 50 MG: 50 CAPSULE ORAL at 21:18

## 2025-05-01 RX ADMIN — DICYCLOMINE HYDROCHLORIDE 10 MG: 10 CAPSULE ORAL at 05:05

## 2025-05-01 RX ADMIN — PANTOPRAZOLE SODIUM 40 MG: 40 TABLET, DELAYED RELEASE ORAL at 16:33

## 2025-05-01 NOTE — H&P
H&P - Hospitalist   Name: Alexis Jules 24 y.o. female I MRN: [id number]  Unit/Bed#: E5 -01 I Date of Admission: 4/30/2025   Date of Service: 4/30/2025 I Hospital Day: 0     Assessment & Plan  Right upper quadrant abdominal pain  Patient has had right upper quadrant abdominal pain for the last 6 days since she had an EGD and colonoscopy.  Is relatively constant in the right upper quadrant.    She already had her gallbladder out.  Already had a hysterectomy for endometriosis.  It is not associated with eating.  She states they found gastritis on the EGD so possibly it is that    Her liver enzymes are mildly elevated but she states they always are    Given supportive care, IV fluids, antiemetics, pain medications and have GI consult on her  Endometriosis determined by laparoscopy  Patient had a hysterectomy in January 2025 in [place]  POTS (postural orthostatic tachycardia syndrome)  Sees a specialist  Elevated LFTs  This may be chronic.  Will have GI evaluate her.  CT abdomen pelvis was normal    She has already had her gallbladder out years ago            Chief Complaint:     Abdominal Pain (Pt with mid abdominal pain radiating into her back with nausea x5 days. Pt had EGD and colonoscopy last Friday and symptoms began after. Pt states this morning she had low grade fever, 99.9. Pt contacted GI who advised she be seen in the ED.)    History of Present Illness  Alexis Jules is a 24 y.o. female with a PMH of chronic abdominal pain, endometriosis status post hysterectomy, status post cholecystectomy who presents with ongoing abdominal pain.  She states she had a EGD colonoscopy last week.  This showed gastritis.  She states since then she has had right upper quadrant abdominal pain.  It comes and goes.  Not really associated with eating.  It is better right now after IV fentanyl in the emergency room.  She does follow with GI as an outpatient.  She has been evaluated at multiple facilities for this abdominal pain  and no one can figure out what is causing it..    Review of Systems   Constitutional:  Negative for chills and fever.   HENT:  Negative for ear pain and sore throat.    Eyes:  Negative for pain and visual disturbance.   Respiratory:  Negative for cough and shortness of breath.    Cardiovascular:  Negative for chest pain and palpitations.   Gastrointestinal:  Positive for abdominal pain. Negative for vomiting.   Genitourinary:  Negative for dysuria and hematuria.   Musculoskeletal:  Negative for arthralgias and back pain.   Skin:  Negative for color change and rash.   Neurological:  Negative for seizures and syncope.   All other systems reviewed and are negative.      Past Medical and Surgical History:   Past Medical History:   Diagnosis Date    Endometriosis     Fibroids     Ovarian cyst     Seizures (HCC)     Non- epileptic    Tourette's      Past Surgical History:   Procedure Laterality Date    CHOLECYSTECTOMY  2022    CYSTOSCOPY N/A 1/3/2025    Procedure: CYSTOSCOPY;  Surgeon: Pallavi Yun MD;  Location: [facility];  Service: Gynecology    ENDOMETRIAL FULGURATION      KS LAPS TOTAL HYSTERECT 250 GM/< W/RMVL TUBE/OVARY N/A 1/3/2025    Procedure: TOTAL LAPAROSCOPIC HYSTERECTOMY, BILATERAL SALPINGECTOMY, EXAM UNDER ANESTHESIA;  Surgeon: Pallavi Yun MD;  Location: [facility];  Service: Gynecology    UPPER GASTROINTESTINAL ENDOSCOPY      WISDOM TOOTH EXTRACTION       Meds/Allergies:  Allergies: No Known Allergies  Prior to Admission Medications   Prescriptions Last Dose Informant Patient Reported? Taking?   Ubrelvy 100 MG tablet Unknown Self Yes No   Sig: Take 100 mg by mouth daily as needed   dicyclomine (BENTYL) 10 mg capsule 4/30/2025  No Yes   Sig: take 1 capsule by mouth four times a day before meals and at bedtime   nortriptyline (PAMELOR) 75 MG capsule 4/30/2025 Morning Self Yes Yes   Sig: Take 50 mg by mouth 2 (two) times a day   omeprazole (PriLOSEC) 40 MG capsule 4/30/2025 Morning  No  Yes   Sig: Take 1 capsule (40 mg total) by mouth daily before breakfast   ondansetron (Zofran) 4 mg tablet Unknown  No No   Sig: Take 1 tablet (4 mg total) by mouth every 8 (eight) hours as needed for nausea or vomiting   polyethylene glycol (GOLYTELY) 4000 mL solution   No No   Sig: Take 4,000 mL by mouth once for 1 dose Take as directed by office instructions prior to colonoscopy.      Facility-Administered Medications: None     Social History:     Social History     Socioeconomic History    Marital status: Single     Spouse name: Not on file    Number of children: Not on file    Years of education: Not on file    Highest education level: Not on file   Occupational History    Not on file   Tobacco Use    Smoking status: Some Days     Current packs/day: 1.00     Types: Cigarettes    Smokeless tobacco: Never   Vaping Use    Vaping status: Every Day    Substances: Nicotine, THC   Substance and Sexual Activity    Alcohol use: Not on file     Comment: rare/quit constipation alcohol years ago    Drug use: Yes     Types: Marijuana     Comment: vapes and joint daily    Sexual activity: Yes     Partners: Male     Birth control/protection: OCP   Other Topics Concern    Not on file   Social History Narrative    Not on file     Social Drivers of Health     Financial Resource Strain: Unknown (10/4/2022)    Received from [facility]    Overall Financial Resource Strain (CARDIA)     Difficulty of Paying Living Expenses: Patient declined   Food Insecurity: No Food Insecurity (4/30/2025)    Nursing - Inadequate Food Risk Classification     Worried About Running Out of Food in the Last Year: Not on file     Ran Out of Food in the Last Year: Not on file     Ran Out of Food in the Last Year: Never true   Transportation Needs: No Transportation Needs (4/30/2025)    Nursing - Transportation Risk Classification     Lack of Transportation: Not on file     Lack of Transportation: No   Physical Activity: Not on file   Stress: Not on  file   Social Connections: Not on file   Intimate Partner Violence: Unknown (2025)    Nursing IPS     Feels Physically and Emotionally Safe: Not on file     Physically Hurt by Someone: Not on file     Humiliated or Emotionally Abused by Someone: Not on file     Physically Hurt by Someone: No     Hurt or Threatened by Someone: No   Housing Stability: Unknown (2025)    Nursing: Inadequate Housing Risk Classification     Has Housing: Not on file     Worried About Losing Housing: Not on file     Unable to Get Utilities: Not on file     Unable to Pay for Housing in the Last Year: No     Has Housin         Objective   Vitals:   Blood Pressure: 132/87 (25)  Pulse: (!) 54 (25)  Temperature: 99.7 °F (37.6 °C) (25)  Temp Source: Oral (25 1059)  Respirations: 16 (25)  Weight - Scale: 106 kg (234 lb 5.6 oz) (25 1700)  SpO2: 96 % (25)    Physical Exam  Vitals and nursing note reviewed.   Constitutional:       General: She is not in acute distress.     Appearance: She is well-developed.   HENT:      Head: Normocephalic and atraumatic.   Eyes:      Conjunctiva/sclera: Conjunctivae normal.   Cardiovascular:      Rate and Rhythm: Normal rate and regular rhythm.      Heart sounds: No murmur heard.  Pulmonary:      Effort: Pulmonary effort is normal. No respiratory distress.      Breath sounds: Normal breath sounds.   Abdominal:      Palpations: Abdomen is soft.      Tenderness: There is no abdominal tenderness.   Musculoskeletal:         General: No swelling.      Cervical back: Neck supple.      Right lower leg: No edema.      Left lower leg: No edema.   Skin:     General: Skin is warm and dry.      Capillary Refill: Capillary refill takes less than 2 seconds.   Neurological:      Mental Status: She is alert.   Psychiatric:         Mood and Affect: Mood normal.         Additional Data:   Lab Results: I have reviewed the following results:  Results from  last 7 days   Lab Units 04/30/25  1142   WBC Thousand/uL 11.22*   HEMOGLOBIN g/dL 13.8   HEMATOCRIT % 40.4   PLATELETS Thousands/uL 326   SEGS PCT % 74   LYMPHO PCT % 19   MONO PCT % 5   EOS PCT % 1     Results from last 7 days   Lab Units 04/30/25  1142   SODIUM mmol/L 138   POTASSIUM mmol/L 4.0   CHLORIDE mmol/L 105   CO2 mmol/L 25   ANION GAP mmol/L 8   BUN mg/dL 8   CREATININE mg/dL 0.69   CALCIUM mg/dL 9.8   ALBUMIN g/dL 4.3   TOTAL BILIRUBIN mg/dL 0.36   ALK PHOS U/L 103   ALT U/L 130*   AST U/L 66*   EGFR ml/min/1.73sq m 123   GLUCOSE RANDOM mg/dL 85     Results from last 7 days   Lab Units 04/30/25  1401   INR  1.02               EKG, Pathology, and Other Studies Reviewed on Admission:   EKG      ** Please Note: This note has been constructed using a voice recognition system. **

## 2025-05-01 NOTE — PROGRESS NOTES
Progress Note - Hospitalist   Name: Alexis Jules 24 y.o. female I MRN: [id number]  Unit/Bed#: E5 -01 I Date of Admission: 4/30/2025   Date of Service: 5/1/2025 I Hospital Day: 0    Assessment & Plan  Right upper quadrant abdominal pain  RUQ pain onset since EGD 4/25. Related to eating, able to tolerate small meals.  Also with diarrhea fluctuating with constipation.   With h/o endometriosis s/p hysterectomy, previously with lap excision for treatment/dx. consider adhesions as factor.  EGD did demonstrate gastritis, hiatal hernia.  Colonoscopy with polyp in transverse colon.  Her liver enzymes are mildly elevated but she states they always are  Continue supportive care, IV fluids, antiemetics, pain medications   GI consult appreciate recommendations  Autoimmune workup pending  Elevated LFTs  This may be chronic.  Will have GI evaluate her.    CT abdomen pelvis unremarkable liver/biliary tree, s/p cholecystectomy. No acute findings in CAP.  Monitor CMP.  GI consulted, appreciate recommendations.  Hepatitis panel without acute infection  EBV IgG positive  GGT elevated  Endometriosis determined by laparoscopy  Patient had a hysterectomy in January 2025 in [place]  POTS (postural orthostatic tachycardia syndrome)  Sees a specialist  Continue with home compression stockings.    VTE Pharmacologic Prophylaxis: VTE Score: 1 Low Risk (Score 0-2) - Encourage Ambulation.    Mobility:   Basic Mobility Inpatient Raw Score: 24  JH-HLM Goal: 8: Walk 250 feet or more  JH-HLM Achieved: 8: Walk 250 feet ot more  JH-HLM Goal achieved. Continue to encourage appropriate mobility.    Patient Centered Rounds: I performed bedside rounds with nursing staff today.   Discussions with Specialists or Other Care Team Provider: CM, GI    Education and Discussions with Family / Patient: Updated  (s/o) via phone.    Current Length of Stay: 0 day(s)  Current Patient Status: Inpatient   Certification Statement: The patient will  continue to require additional inpatient hospital stay due to continued symptoms  Discharge Plan: Anticipate discharge tomorrow to home.    Code Status: Level 1 - Full Code    Subjective   Remains with significant abdominal pain. Tolerating meals but only small portions at a time.    Objective :  Temp:  [98 °F (36.7 °C)-99.7 °F (37.6 °C)] 98 °F (36.7 °C)  HR:  [54-96] 83  BP: (121-143)/(70-87) 122/80  Resp:  [16-18] 16  SpO2:  [96 %-99 %] 97 %  O2 Device: None (Room air)    Body mass index is 34.91 kg/m².     Input and Output Summary (last 24 hours):     Intake/Output Summary (Last 24 hours) at 5/1/2025 1516  Last data filed at 5/1/2025 1300  Gross per 24 hour   Intake 600 ml   Output 600 ml   Net 0 ml       Physical Exam  Vitals reviewed.   Constitutional:       General: She is not in acute distress.     Appearance: Normal appearance. She is not toxic-appearing.   HENT:      Head: Normocephalic and atraumatic.   Eyes:      General: No scleral icterus.  Cardiovascular:      Rate and Rhythm: Normal rate and regular rhythm.      Heart sounds: Normal heart sounds.   Pulmonary:      Effort: Pulmonary effort is normal. No respiratory distress.      Breath sounds: Normal breath sounds. No stridor.   Abdominal:      General: Abdomen is flat. Bowel sounds are normal. There is no distension.      Palpations: Abdomen is soft.      Tenderness: There is abdominal tenderness (RUQ). There is no guarding or rebound.   Musculoskeletal:         General: Normal range of motion.      Cervical back: Normal range of motion.   Skin:     Coloration: Skin is not jaundiced or pale.   Neurological:      General: No focal deficit present.      Mental Status: She is alert and oriented to person, place, and time. Mental status is at baseline.             Lab Results: I have reviewed the following results:   Results from last 7 days   Lab Units 05/01/25  0432   WBC Thousand/uL 8.92   HEMOGLOBIN g/dL 12.9   HEMATOCRIT % 38.5   PLATELETS  Thousands/uL 285   SEGS PCT % 53   LYMPHO PCT % 37   MONO PCT % 7   EOS PCT % 3     Results from last 7 days   Lab Units 05/01/25  0432   SODIUM mmol/L 138   POTASSIUM mmol/L 3.5   CHLORIDE mmol/L 107   CO2 mmol/L 24   BUN mg/dL 6   CREATININE mg/dL 0.63   ANION GAP mmol/L 7   CALCIUM mg/dL 8.7   ALBUMIN g/dL 3.5   TOTAL BILIRUBIN mg/dL 0.46   ALK PHOS U/L 88   ALT U/L 97*   AST U/L 53*   GLUCOSE RANDOM mg/dL 80     Results from last 7 days   Lab Units 04/30/25  1401   INR  1.02             Results from last 7 days   Lab Units 04/30/25  1142   LACTIC ACID mmol/L 1.0   PROCALCITONIN ng/ml <0.05       Recent Cultures (last 7 days):         Imaging Results Review: I reviewed radiology reports from this admission including: CT chest and CT abdomen/pelvis.  Other Study Results Review: Pathology reports reviewed.    Last 24 Hours Medication List:     Current Facility-Administered Medications:     dicyclomine (BENTYL) capsule 20 mg, 4x Daily (AC & HS)    HYDROcodone-acetaminophen (NORCO) 5-325 mg per tablet 1 tablet, Q6H PRN    nicotine (NICODERM CQ) 14 mg/24hr TD 24 hr patch 1 patch, Daily    nortriptyline (PAMELOR) capsule 50 mg, BID    ondansetron (ZOFRAN) injection 4 mg, Q4H PRN    oxyCODONE (ROXICODONE) immediate release tablet 10 mg, Q4H PRN    pantoprazole (PROTONIX) EC tablet 40 mg, BID AC    sodium chloride 0.9 % infusion, Continuous, Last Rate: 75 mL/hr (04/30/25 2116)    Administrative Statements   Today, Patient Was Seen By: Marie Oliva PA-C    **Please Note: This note may have been constructed using a voice recognition system.**

## 2025-05-01 NOTE — PLAN OF CARE
Problem: PAIN - ADULT  Goal: Verbalizes/displays adequate comfort level or baseline comfort level  Description: Interventions:- Encourage patient to monitor pain and request assistance- Assess pain using appropriate pain scale- Administer analgesics based on type and severity of pain and evaluate response- Implement non-pharmacological measures as appropriate and evaluate response- Consider cultural and social influences on pain and pain management- Notify physician/advanced practitioner if interventions unsuccessful or patient reports new pain  Outcome: Progressing     Problem: INFECTION - ADULT  Goal: Absence or prevention of progression during hospitalization  Description: INTERVENTIONS:- Assess and monitor for signs and symptoms of infection- Monitor lab/diagnostic results- Monitor all insertion sites, i.e. indwelling lines, tubes, and drains- Monitor endotracheal if appropriate and nasal secretions for changes in amount and color- Bath appropriate cooling/warming therapies per order- Administer medications as ordered- Instruct and encourage patient and family to use good hand hygiene technique- Identify and instruct in appropriate isolation precautions for identified infection/condition  Outcome: Progressing  Goal: Absence of fever/infection during neutropenic period  Description: INTERVENTIONS:- Monitor WBC  Outcome: Progressing     Problem: SAFETY ADULT  Goal: Patient will remain free of falls  Description: INTERVENTIONS:- Educate patient/family on patient safety including physical limitations- Instruct patient to call for assistance with activity - Consult OT/PT to assist with strengthening/mobility - Keep Call bell within reach- Keep bed low and locked with side rails adjusted as appropriate- Keep care items and personal belongings within reach- Initiate and maintain comfort rounds- Make Fall Risk Sign visible to staff- Offer Toileting every 2 Hours, in advance of need- Initiate/Maintain  Obtain  necessary fall risk management equipment Apply yellow socks and bracelet for high fall risk patients- Consider moving patient to room near nurses station  Outcome: Progressing  Goal: Maintain or return to baseline ADL function  Description: INTERVENTIONS:-  Assess patient's ability to carry out ADLs; assess patient's baseline for ADL function and identify physical deficits which impact ability to perform ADLs (bathing, care of mouth/teeth, toileting, grooming, dressing, etc.)- Assess/evaluate cause of self-care deficits - Assess range of motion- Assess patient's mobility; develop plan if impaired- Assess patient's need for assistive devices and provide as appropriate- Encourage maximum independence but intervene and supervise when necessary- Involve family in performance of ADLs- Assess for home care needs following discharge - Consider OT consult to assist with ADL evaluation and planning for discharge- Provide patient education as appropriate  Outcome: Progressing  Goal: Maintains/Returns to pre admission functional level  Description: INTERVENTIONS:- Perform AM-PAC 6 Click Basic Mobility/ Daily Activity assessment daily.- Set and communicate daily mobility goal to care team and patient/family/caregiver. - Collaborate with rehabilitation services on mobility goals if consulted- Perform Range of Motion 3 times a day.- Reposition patient every 2 hours.- Dangle patient 3 times a day- Stand patient 3 times a day- Ambulate patient 3 times a day- Out of bed to chair 3 times a day - Out of bed for meals 3 times a day- Out of bed for toileting- Record patient progress and toleration of activity level   Outcome: Progressing     Problem: DISCHARGE PLANNING  Goal: Discharge to home or other facility with appropriate resources  Description: INTERVENTIONS:- Identify barriers to discharge w/patient and caregiver- Arrange for needed discharge resources and transportation as appropriate- Identify discharge learning needs (meds,  wound care, etc.)- Arrange for interpretive services to assist at discharge as needed- Refer to Case Management Department for coordinating discharge planning if the patient needs post-hospital services based on physician/advanced practitioner order or complex needs related to functional status, cognitive ability, or social support system  Outcome: Progressing     Problem: Knowledge Deficit  Goal: Patient/family/caregiver demonstrates understanding of disease process, treatment plan, medications, and discharge instructions  Description: Complete learning assessment and assess knowledge base.Interventions:- Provide teaching at level of understanding- Provide teaching via preferred learning methods  Outcome: Progressing

## 2025-05-01 NOTE — ASSESSMENT & PLAN NOTE
RUQ pain onset since EGD 4/25. Related to eating, able to tolerate small meals.  Also with diarrhea fluctuating with constipation.   With h/o endometriosis s/p hysterectomy, previously with lap excision for treatment/dx. consider adhesions as factor.  EGD did demonstrate gastritis, hiatal hernia.  Colonoscopy with polyp in transverse colon.  Her liver enzymes are mildly elevated but she states they always are  Continue supportive care, IV fluids, antiemetics, pain medications   GI consult appreciate recommendations  Autoimmune workup pending

## 2025-05-01 NOTE — UTILIZATION REVIEW
Initial Clinical Review    OBSERVATION   4./30  changed to IP ADMISSION  5/1  @   1505  The patient will continue to require additional inpatient hospital stay due to continued symptoms     Admission: Date/Time/Statement:   Admission Orders (From admission, onward)       Ordered        04/30/25 1607  Place in Observation  Once                          05/01/25 1505  INPATIENT ADMISSION  Once        Transfer Service: Hospitalist   Question Answer Comment   Level of Care Med Surg    Estimated length of stay More than 2 Midnights    Certification I certify that inpatient services are medically necessary for this patient for a duration of greater than two midnights. See H&P and MD Progress Notes for additional information about the patient's course of treatment.        05/01/25 1504       ED Arrival Information       Expected   -    Arrival   4/30/2025 10:50    Acuity   Urgent              Means of arrival   Walk-In    Escorted by   Self    Service   Hospitalist    Admission type   Emergency              Arrival complaint   Abdominal Pain,Dizziness             Chief Complaint   Patient presents with    Abdominal Pain     Pt with mid abdominal pain radiating into her back with nausea x5 days. Pt had EGD and colonoscopy last Friday and symptoms began after. Pt states this morning she had low grade fever, 99.9. Pt contacted GI who advised she be seen in the ED.       Initial Presentation: 24 y.o. female presents to ED from home with mid abdominal pain radiating to back with nausea for past 5  days.   Had an  EGD and colonoscopy 5 days  ago and symptoms started then.  Low grade temp the  day of admission,  99.9.   Called  Gi and referred to ED.  PMH is chronic abdominal pain, endometriosis, s/p hysterectomy and S/P  cholecystecxtomy.  GI  work up shoed gastritis.    CT scan normal.  Pain improved after  IV  fentanyl in ED.   Has been evaluated  at multiple facilities f or  same symptoms, unable to determine cause. Admit   Observation with RUQ  abdominal pain , elevated  LFT's  and plan is  pain  control,  IVF, antiemetics as needed  and  GI consult.     5/1  IP ADMISSION  Still w ith significant abdominal pain.   Tolerating meals, only small amounts.      Gi consult  Unclear etiology  for abdominal pain.  Work up thus far  unremarkable.    Plan to increase bentyl.  + daily  marijuana.     5/2   Now  has  generalized itching  without reciprocal rash,  onset  HS  5/1.  Monitor for symptom progression.  On atarax  PRN.    Still with nausea and constipation.  Continue to monitor labs.  Continue current meds.    ED Treatment-Medication Administration from 04/30/2025 1050 to 04/30/2025 1653         Date/Time Order Dose Route Action     04/30/2025 1152 sodium chloride 0.9 % bolus 1,000 mL 1,000 mL Intravenous New Bag     04/30/2025 1158 ketorolac (TORADOL) injection 30 mg 30 mg Intravenous Given     04/30/2025 1158 ondansetron (ZOFRAN) injection 4 mg 4 mg Intravenous Given     04/30/2025 1255 iohexol (OMNIPAQUE) 350 MG/ML injection (SINGLE-DOSE) 100 mL 100 mL Intravenous Given     04/30/2025 1459 hyoscyamine (LEVSIN/SL) SL tablet 0.125 mg 0.125 mg Sublingual Given     04/30/2025 1458 dicyclomine (BENTYL) tablet 20 mg 20 mg Oral Given            Scheduled Medications:  dicyclomine, 10 mg, Oral, 4x Daily (AC & HS)  magnesium sulfate, 4 g, Intravenous, Once  nicotine, 1 patch, Transdermal, Daily  nortriptyline, 50 mg, Oral, BID  pantoprazole, 40 mg, Oral, BID AC      Continuous IV Infusions:  sodium chloride, 75 mL/hr, Intravenous, Continuous      PRN Meds:  HYDROcodone-acetaminophen, 1 tablet, Oral, Q6H PRN  ondansetron, 4 mg, Intravenous, Q4H PRN  ( x1  4/30 and  X 1  5/1 thus far)    oxyCODONE, 10 mg, Oral, Q4H PRN  ( x1  5/1 thus far)       ED Triage Vitals [04/30/25 1059]   Temperature Pulse Respirations Blood Pressure SpO2 Pain Score   98.3 °F (36.8 °C) (!) 114 16 140/82 98 % 6     Weight (last 2 days)       Date/Time Weight    04/30/25  1700 106 (234.35)            Vital Signs (last 3 days)       Date/Time Temp Pulse Resp BP MAP (mmHg) SpO2 O2 Device Patient Position - Orthostatic VS Pain    05/01/25 0720 -- -- -- -- -- -- -- -- 6 05/01/25 07:08:37 98 °F (36.7 °C) 83 16 122/80 94 99 % None (Room air) -- --    04/30/25 22:41:27 98.4 °F (36.9 °C) 83 -- 121/70 87 97 % None (Room air) -- --    04/30/25 2110 -- -- -- -- -- -- -- -- 6 04/30/25 16:56:05 99.7 °F (37.6 °C) 54 16 132/87 102 96 % -- -- --    04/30/25 1654 -- -- -- -- -- -- -- -- 5    04/30/25 1615 -- 96 18 143/87 -- 99 % -- -- --    04/30/25 1158 -- -- -- -- -- -- -- -- 6 04/30/25 1059 98.3 °F (36.8 °C) 114 16 140/82 -- 98 % None (Room air) Sitting 6              Pertinent Labs/Diagnostic Test Results:   Radiology:  CT chest abdomen pelvis w contrast   Final Interpretation by Sara Scott MD (04/30 1342)      No acute findings in the chest, abdomen or pelvis.               Workstation performed: [id number]           Cardiology:  ECG 12 lead   Final Result by Maykel Orourke DO (04/30 1233)   Normal sinus rhythm   Normal ECG   When compared with ECG of 30-Apr-2025 11:48, (unconfirmed)   Aberrant conduction is no longer Present   Confirmed by Maykel Orourke ([id number]) on 4/30/2025 12:33:59 PM          Results from last 7 days   Lab Units 05/01/25  0432 04/30/25  1142   WBC Thousand/uL 8.92 11.22*   HEMOGLOBIN g/dL 12.9 13.8   HEMATOCRIT % 38.5 40.4   PLATELETS Thousands/uL 285 326   TOTAL NEUT ABS Thousands/µL 4.72 8.30*         Results from last 7 days   Lab Units 05/01/25  0432 04/30/25  1142   SODIUM mmol/L 138 138   POTASSIUM mmol/L 3.5 4.0   CHLORIDE mmol/L 107 105   CO2 mmol/L 24 25   ANION GAP mmol/L 7 8   BUN mg/dL 6 8   CREATININE mg/dL 0.63 0.69   EGFR ml/min/1.73sq m 126 123   CALCIUM mg/dL 8.7 9.8   MAGNESIUM mg/dL 1.7* 1.8*     Results from last 7 days   Lab Units 05/01/25  0432 04/30/25  1142   AST U/L 53* 66*   ALT U/L 97* 130*   ALK PHOS U/L 88 103   TOTAL PROTEIN g/dL 6.0*  7.5   ALBUMIN g/dL 3.5 4.3   TOTAL BILIRUBIN mg/dL 0.46 0.36   BILIRUBIN DIRECT mg/dL  --  0.05         Results from last 7 days   Lab Units 05/01/25  0432 04/30/25  1142   GLUCOSE RANDOM mg/dL 80 85               Results from last 7 days   Lab Units 04/30/25  1142   HS TNI 0HR ng/L <2         Results from last 7 days   Lab Units 04/30/25  1401   PROTIME seconds 13.6   INR  1.02   PTT seconds 21*         Results from last 7 days   Lab Units 04/30/25  1142   PROCALCITONIN ng/ml <0.05     Results from last 7 days   Lab Units 04/30/25  1142   LACTIC ACID mmol/L 1.0                                 Results from last 7 days   Lab Units 04/30/25  1142   LIPASE u/L 11                   Present on Admission:   Right upper quadrant abdominal pain   Elevated LFTs   POTS (postural orthostatic tachycardia syndrome)   Endometriosis determined by laparoscopy      Admitting Diagnosis: Nausea [R11.0]  Abdominal pain [R10.9]  Elevated LFTs [R79.89]  Right upper quadrant abdominal pain [R10.11]  Age/Sex: 24 y.o. female    Network Utilization Review Department  ATTENTION: Please call with any questions or concerns to [phone] and carefully listen to the prompts so that you are directed to the right person. All voicemails are confidential.   For Discharge needs, contact Care Management DC Support Team at [phone] opt. [phone]  Send all requests for admission clinical reviews, approved or denied determinations and any other requests to dedicated fax number below belonging to the campus where the patient is receiving treatment. List of dedicated fax numbers for the Facilities:  FACILITY NAME UR FAX NUMBER   ADMISSION DENIALS (Administrative/Medical Necessity) [phone]   DISCHARGE SUPPORT TEAM (NETWORK) [phone]   PARENT CHILD HEALTH (Maternity/NICU/Pediatrics) [phone]   [facility] [phone]   [facility] [phone]   [facility]  [phone]   [facility] [phone]   [facility] [phone]   [facility] [phone]   [facility] [phone]   [facility] [phone]   [facility] [phone]   [facility] [phone]   [facility] [phone]   [facility] [phone]

## 2025-05-01 NOTE — ASSESSMENT & PLAN NOTE
This may be chronic.  Will have GI evaluate her.  CT abdomen pelvis was normal    She has already had her gallbladder out years ago

## 2025-05-01 NOTE — ASSESSMENT & PLAN NOTE
Patient has had right upper quadrant abdominal pain for the last 6 days since she had an EGD and colonoscopy.  Is relatively constant in the right upper quadrant.    She already had her gallbladder out.  Already had a hysterectomy for endometriosis.  It is not associated with eating.  She states they found gastritis on the EGD so possibly it is that    Her liver enzymes are mildly elevated but she states they always are    Given supportive care, IV fluids, antiemetics, pain medications and have GI consult on her

## 2025-05-01 NOTE — ASSESSMENT & PLAN NOTE
This may be chronic.  Will have GI evaluate her.    CT abdomen pelvis unremarkable liver/biliary tree, s/p cholecystectomy. No acute findings in CAP.  Monitor CMP.  GI consulted, appreciate recommendations.  Hepatitis panel without acute infection  EBV IgG positive  GGT elevated

## 2025-05-01 NOTE — PLAN OF CARE
Problem: PAIN - ADULT  Goal: Verbalizes/displays adequate comfort level or baseline comfort level  Description: Interventions:- Encourage patient to monitor pain and request assistance- Assess pain using appropriate pain scale- Administer analgesics based on type and severity of pain and evaluate response- Implement non-pharmacological measures as appropriate and evaluate response- Consider cultural and social influences on pain and pain management- Notify physician/advanced practitioner if interventions unsuccessful or patient reports new pain  Outcome: Progressing     Problem: INFECTION - ADULT  Goal: Absence or prevention of progression during hospitalization  Description: INTERVENTIONS:- Assess and monitor for signs and symptoms of infection- Monitor lab/diagnostic results- Monitor all insertion sites, i.e. indwelling lines, tubes, and drains- Monitor endotracheal if appropriate and nasal secretions for changes in amount and color- [place] appropriate cooling/warming therapies per order- Administer medications as ordered- Instruct and encourage patient and family to use good hand hygiene technique- Identify and instruct in appropriate isolation precautions for identified infection/condition  Outcome: Progressing  Goal: Absence of fever/infection during neutropenic period  Description: INTERVENTIONS:- Monitor WBC  Outcome: Progressing     Problem: SAFETY ADULT  Goal: Patient will remain free of falls  Description: INTERVENTIONS:- Educate patient/family on patient safety including physical limitations- Instruct patient to call for assistance with activity - Consult OT/PT to assist with strengthening/mobility - Keep Call bell within reach- Keep bed low and locked with side rails adjusted as appropriate- Keep care items and personal belongings within reach- Initiate and maintain comfort rounds- Make Fall Risk Sign visible to staff- Offer Toileting every 2 Hours, in advance of need- Initiate/Maintain  Obtain  necessary fall risk management equipment Apply yellow socks and bracelet for high fall risk patients- Consider moving patient to room near nurses station  Outcome: Progressing  Goal: Maintain or return to baseline ADL function  Description: INTERVENTIONS:-  Assess patient's ability to carry out ADLs; assess patient's baseline for ADL function and identify physical deficits which impact ability to perform ADLs (bathing, care of mouth/teeth, toileting, grooming, dressing, etc.)- Assess/evaluate cause of self-care deficits - Assess range of motion- Assess patient's mobility; develop plan if impaired- Assess patient's need for assistive devices and provide as appropriate- Encourage maximum independence but intervene and supervise when necessary- Involve family in performance of ADLs- Assess for home care needs following discharge - Consider OT consult to assist with ADL evaluation and planning for discharge- Provide patient education as appropriate  Outcome: Progressing  Goal: Maintains/Returns to pre admission functional level  Description: INTERVENTIONS:- Perform AM-PAC 6 Click Basic Mobility/ Daily Activity assessment daily.- Set and communicate daily mobility goal to care team and patient/family/caregiver. - Collaborate with rehabilitation services on mobility goals if consulted- Perform Range of Motion 3 times a day.- Reposition patient every 2 hours.- Dangle patient 3 times a day- Stand patient 3 times a day- Ambulate patient 3 times a day- Out of bed to chair 3 times a day - Out of bed for meals 3 times a day- Out of bed for toileting- Record patient progress and toleration of activity level   Outcome: Progressing     Problem: DISCHARGE PLANNING  Goal: Discharge to home or other facility with appropriate resources  Description: INTERVENTIONS:- Identify barriers to discharge w/patient and caregiver- Arrange for needed discharge resources and transportation as appropriate- Identify discharge learning needs (meds,  wound care, etc.)- Arrange for interpretive services to assist at discharge as needed- Refer to Case Management Department for coordinating discharge planning if the patient needs post-hospital services based on physician/advanced practitioner order or complex needs related to functional status, cognitive ability, or social support system  Outcome: Progressing     Problem: Knowledge Deficit  Goal: Patient/family/caregiver demonstrates understanding of disease process, treatment plan, medications, and discharge instructions  Description: Complete learning assessment and assess knowledge base.Interventions:- Provide teaching at level of understanding- Provide teaching via preferred learning methods  Outcome: Progressing

## 2025-05-02 PROBLEM — R42 DIZZINESS: Status: ACTIVE | Noted: 2025-05-02

## 2025-05-02 PROBLEM — L29.9 ITCHING: Status: ACTIVE | Noted: 2025-05-02

## 2025-05-02 LAB
ALBUMIN SERPL BCG-MCNC: 3.8 G/DL (ref 3.5–5)
ALP SERPL-CCNC: 96 U/L (ref 34–104)
ALT SERPL W P-5'-P-CCNC: 111 U/L (ref 7–52)
ANION GAP SERPL CALCULATED.3IONS-SCNC: 6 MMOL/L (ref 4–13)
AST SERPL W P-5'-P-CCNC: 72 U/L (ref 13–39)
BILIRUB SERPL-MCNC: 0.38 MG/DL (ref 0.2–1)
BUN SERPL-MCNC: 6 MG/DL (ref 5–25)
CALCIUM SERPL-MCNC: 9.1 MG/DL (ref 8.4–10.2)
CHLORIDE SERPL-SCNC: 105 MMOL/L (ref 96–108)
CO2 SERPL-SCNC: 26 MMOL/L (ref 21–32)
CREAT SERPL-MCNC: 0.72 MG/DL (ref 0.6–1.3)
ERYTHROCYTE [DISTWIDTH] IN BLOOD BY AUTOMATED COUNT: 13 % (ref 11.6–15.1)
GFR SERPL CREATININE-BSD FRML MDRD: 118 ML/MIN/1.73SQ M
GLUCOSE SERPL-MCNC: 81 MG/DL (ref 65–140)
HCT VFR BLD AUTO: 41.2 % (ref 34.8–46.1)
HGB BLD-MCNC: 13.7 G/DL (ref 11.5–15.4)
MAGNESIUM SERPL-MCNC: 2.1 MG/DL (ref 1.9–2.7)
MCH RBC QN AUTO: 28.7 PG (ref 26.8–34.3)
MCHC RBC AUTO-ENTMCNC: 33.3 G/DL (ref 31.4–37.4)
MCV RBC AUTO: 86 FL (ref 82–98)
PLATELET # BLD AUTO: 298 THOUSANDS/UL (ref 149–390)
PMV BLD AUTO: 9.3 FL (ref 8.9–12.7)
POTASSIUM SERPL-SCNC: 3.7 MMOL/L (ref 3.5–5.3)
PROT SERPL-MCNC: 6.5 G/DL (ref 6.4–8.4)
RBC # BLD AUTO: 4.78 MILLION/UL (ref 3.81–5.12)
SODIUM SERPL-SCNC: 137 MMOL/L (ref 135–147)
WBC # BLD AUTO: 9.25 THOUSAND/UL (ref 4.31–10.16)

## 2025-05-02 PROCEDURE — 82390 ASSAY OF CERULOPLASMIN: CPT

## 2025-05-02 PROCEDURE — 80053 COMPREHEN METABOLIC PANEL: CPT

## 2025-05-02 PROCEDURE — 82103 ALPHA-1-ANTITRYPSIN TOTAL: CPT

## 2025-05-02 PROCEDURE — 85027 COMPLETE CBC AUTOMATED: CPT

## 2025-05-02 PROCEDURE — 86015 ACTIN ANTIBODY EACH: CPT

## 2025-05-02 PROCEDURE — 86381 MITOCHONDRIAL ANTIBODY EACH: CPT

## 2025-05-02 PROCEDURE — 99232 SBSQ HOSP IP/OBS MODERATE 35: CPT

## 2025-05-02 PROCEDURE — 83735 ASSAY OF MAGNESIUM: CPT

## 2025-05-02 RX ORDER — HYDROXYZINE HYDROCHLORIDE 25 MG/1
25 TABLET, FILM COATED ORAL EVERY 6 HOURS PRN
Status: DISCONTINUED | OUTPATIENT
Start: 2025-05-02 | End: 2025-05-02

## 2025-05-02 RX ORDER — DIPHENHYDRAMINE HYDROCHLORIDE 50 MG/ML
25 INJECTION, SOLUTION INTRAMUSCULAR; INTRAVENOUS EVERY 6 HOURS PRN
Status: DISCONTINUED | OUTPATIENT
Start: 2025-05-02 | End: 2025-05-03 | Stop reason: HOSPADM

## 2025-05-02 RX ADMIN — HYDROCODONE BITARTRATE AND ACETAMINOPHEN 1 TABLET: 5; 325 TABLET ORAL at 21:44

## 2025-05-02 RX ADMIN — DICYCLOMINE HYDROCHLORIDE 20 MG: 10 CAPSULE ORAL at 21:42

## 2025-05-02 RX ADMIN — ONDANSETRON 4 MG: 2 INJECTION INTRAMUSCULAR; INTRAVENOUS at 08:48

## 2025-05-02 RX ADMIN — NORTRIPTYLINE HYDROCHLORIDE 50 MG: 50 CAPSULE ORAL at 08:44

## 2025-05-02 RX ADMIN — OXYCODONE HYDROCHLORIDE 10 MG: 10 TABLET ORAL at 05:26

## 2025-05-02 RX ADMIN — PANTOPRAZOLE SODIUM 40 MG: 40 TABLET, DELAYED RELEASE ORAL at 05:15

## 2025-05-02 RX ADMIN — NORTRIPTYLINE HYDROCHLORIDE 50 MG: 50 CAPSULE ORAL at 21:41

## 2025-05-02 RX ADMIN — NICOTINE 1 PATCH: 14 PATCH, EXTENDED RELEASE TRANSDERMAL at 08:44

## 2025-05-02 RX ADMIN — DICYCLOMINE HYDROCHLORIDE 20 MG: 10 CAPSULE ORAL at 05:15

## 2025-05-02 RX ADMIN — HYDROXYZINE HYDROCHLORIDE 25 MG: 25 TABLET, FILM COATED ORAL at 10:37

## 2025-05-02 RX ADMIN — DIPHENHYDRAMINE HYDROCHLORIDE 25 MG: 50 INJECTION, SOLUTION INTRAMUSCULAR; INTRAVENOUS at 15:32

## 2025-05-02 RX ADMIN — SODIUM CHLORIDE 75 ML/HR: 0.9 INJECTION, SOLUTION INTRAVENOUS at 05:16

## 2025-05-02 RX ADMIN — DICYCLOMINE HYDROCHLORIDE 20 MG: 10 CAPSULE ORAL at 10:36

## 2025-05-02 RX ADMIN — PANTOPRAZOLE SODIUM 40 MG: 40 TABLET, DELAYED RELEASE ORAL at 15:32

## 2025-05-02 RX ADMIN — ONDANSETRON 4 MG: 2 INJECTION INTRAMUSCULAR; INTRAVENOUS at 15:32

## 2025-05-02 RX ADMIN — DICYCLOMINE HYDROCHLORIDE 20 MG: 10 CAPSULE ORAL at 15:32

## 2025-05-02 NOTE — ASSESSMENT & PLAN NOTE
H/o POTS and nonepileptic seizures with aura  Is complaining of positional dizziness starting yesterday  Monitor symptoms.

## 2025-05-02 NOTE — PLAN OF CARE
Problem: PAIN - ADULT  Goal: Verbalizes/displays adequate comfort level or baseline comfort level  Description: Interventions:- Encourage patient to monitor pain and request assistance- Assess pain using appropriate pain scale- Administer analgesics based on type and severity of pain and evaluate response- Implement non-pharmacological measures as appropriate and evaluate response- Consider cultural and social influences on pain and pain management- Notify physician/advanced practitioner if interventions unsuccessful or patient reports new pain  Outcome: Progressing     Problem: INFECTION - ADULT  Goal: Absence or prevention of progression during hospitalization  Description: INTERVENTIONS:- Assess and monitor for signs and symptoms of infection- Monitor lab/diagnostic results- Monitor all insertion sites, i.e. indwelling lines, tubes, and drains- Monitor endotracheal if appropriate and nasal secretions for changes in amount and color- [place] appropriate cooling/warming therapies per order- Administer medications as ordered- Instruct and encourage patient and family to use good hand hygiene technique- Identify and instruct in appropriate isolation precautions for identified infection/condition  Outcome: Progressing  Goal: Absence of fever/infection during neutropenic period  Description: INTERVENTIONS:- Monitor WBC  Outcome: Progressing     Problem: SAFETY ADULT  Goal: Patient will remain free of falls  Description: INTERVENTIONS:- Educate patient/family on patient safety including physical limitations- Instruct patient to call for assistance with activity - Consult OT/PT to assist with strengthening/mobility - Keep Call bell within reach- Keep bed low and locked with side rails adjusted as appropriate- Keep care items and personal belongings within reach- Initiate and maintain comfort rounds- Make Fall Risk Sign visible to staff- Offer Toileting every 2 Hours, in advance of need- Initiate/Maintain  Obtain  necessary fall risk management equipment Apply yellow socks and bracelet for high fall risk patients- Consider moving patient to room near nurses station  Outcome: Progressing  Goal: Maintain or return to baseline ADL function  Description: INTERVENTIONS:-  Assess patient's ability to carry out ADLs; assess patient's baseline for ADL function and identify physical deficits which impact ability to perform ADLs (bathing, care of mouth/teeth, toileting, grooming, dressing, etc.)- Assess/evaluate cause of self-care deficits - Assess range of motion- Assess patient's mobility; develop plan if impaired- Assess patient's need for assistive devices and provide as appropriate- Encourage maximum independence but intervene and supervise when necessary- Involve family in performance of ADLs- Assess for home care needs following discharge - Consider OT consult to assist with ADL evaluation and planning for discharge- Provide patient education as appropriate  Outcome: Progressing  Goal: Maintains/Returns to pre admission functional level  Description: INTERVENTIONS:- Perform AM-PAC 6 Click Basic Mobility/ Daily Activity assessment daily.- Set and communicate daily mobility goal to care team and patient/family/caregiver. - Collaborate with rehabilitation services on mobility goals if consulted- Perform Range of Motion 3 times a day.- Reposition patient every 2 hours.- Dangle patient 3 times a day- Stand patient 3 times a day- Ambulate patient 3 times a day- Out of bed to chair 3 times a day - Out of bed for meals 3 times a day- Out of bed for toileting- Record patient progress and toleration of activity level   Outcome: Progressing     Problem: DISCHARGE PLANNING  Goal: Discharge to home or other facility with appropriate resources  Description: INTERVENTIONS:- Identify barriers to discharge w/patient and caregiver- Arrange for needed discharge resources and transportation as appropriate- Identify discharge learning needs (meds,  wound care, etc.)- Arrange for interpretive services to assist at discharge as needed- Refer to Case Management Department for coordinating discharge planning if the patient needs post-hospital services based on physician/advanced practitioner order or complex needs related to functional status, cognitive ability, or social support system  Outcome: Progressing     Problem: Knowledge Deficit  Goal: Patient/family/caregiver demonstrates understanding of disease process, treatment plan, medications, and discharge instructions  Description: Complete learning assessment and assess knowledge base.Interventions:- Provide teaching at level of understanding- Provide teaching via preferred learning methods  Outcome: Progressing     Problem: Nutrition/Hydration-ADULT  Goal: Nutrient/Hydration intake appropriate for improving, restoring or maintaining nutritional needs  Description: Monitor and assess patient's nutrition/hydration status for malnutrition. Collaborate with interdisciplinary team and initiate plan and interventions as ordered.  Monitor patient's weight and dietary intake as ordered or per policy. Utilize nutrition screening tool and intervene as necessary. Determine patient's food preferences and provide high-protein, high-caloric foods as appropriate. INTERVENTIONS:- Monitor oral intake, urinary output, labs, and treatment plans- Assess nutrition and hydration status and recommend course of action- Evaluate amount of meals eaten- Assist patient with eating if necessary - Allow adequate time for meals- Recommend/ encourage appropriate diets, oral nutritional supplements, and vitamin/mineral supplements- Order, calculate, and assess calorie counts as needed- Recommend, monitor, and adjust tube feedings and TPN/PPN based on assessed needs- Assess need for intravenous fluids- Provide specific nutrition/hydration education as appropriate- Include patient/family/caregiver in decisions related to nutrition  Outcome:  Progressing

## 2025-05-02 NOTE — ASSESSMENT & PLAN NOTE
RUQ pain onset since EGD 4/25. Related to eating, able to tolerate small meals.  Also with diarrhea fluctuating with constipation.   With h/o endometriosis s/p hysterectomy, previously with lap excision for treatment/dx. Also s/p lap brady. Consider adhesions as factor.  EGD did demonstrate gastritis, hiatal hernia.  Colonoscopy with polyp in transverse colon.  Her liver enzymes are mildly elevated but she states they always are  Continue supportive care, IV fluids, antiemetics, pain medications   GI consult appreciate recommendations  Autoimmune workup pending  Consider functional cause.  Initiate on bentyl QID  Plan for outpatient follow up regarding initiation on TCA

## 2025-05-02 NOTE — ASSESSMENT & PLAN NOTE
This may be chronic.  Will have GI evaluate her.    CT abdomen pelvis unremarkable liver/biliary tree, s/p cholecystectomy. No acute findings in CAP.  Monitor CMP.  GI consulted, appreciate recommendations.  Hepatocellular injury pattern  Hepatitis panel without acute infection  EBV IgG positive  GGT elevated  Pending autoimmune workup

## 2025-05-02 NOTE — PROGRESS NOTES
Progress Note - Hospitalist   Name: Alexis Jules 24 y.o. female I MRN: [id number]  Unit/Bed#: E5 -01 I Date of Admission: 4/30/2025   Date of Service: 5/2/2025 I Hospital Day: 1    Assessment & Plan  Right upper quadrant abdominal pain  RUQ pain onset since EGD 4/25. Related to eating, able to tolerate small meals.  Also with diarrhea fluctuating with constipation.   With h/o endometriosis s/p hysterectomy, previously with lap excision for treatment/dx. Also s/p lap brady. Consider adhesions as factor.  EGD did demonstrate gastritis, hiatal hernia.  Colonoscopy with polyp in transverse colon.  Her liver enzymes are mildly elevated but she states they always are  Continue supportive care, IV fluids, antiemetics, pain medications   GI consult appreciate recommendations  Autoimmune workup pending  Consider functional cause.  Initiate on bentyl QID  Plan for outpatient follow up regarding initiation on TCA  Elevated LFTs  This may be chronic.  Will have GI evaluate her.    CT abdomen pelvis unremarkable liver/biliary tree, s/p cholecystectomy. No acute findings in CAP.  Monitor CMP.  GI consulted, appreciate recommendations.  Hepatocellular injury pattern  Hepatitis panel without acute infection  EBV IgG positive  GGT elevated  Pending autoimmune workup  Itching  Generalized, not associated with rash or skin discoloration  5/1 night onset  Monitor for symptom progression  PRN atarax.  Dizziness  H/o POTS and nonepileptic seizures with aura  Is complaining of positional dizziness starting yesterday  Monitor symptoms.  POTS (postural orthostatic tachycardia syndrome)  Sees a specialist  Continue with home compression stockings.  Endometriosis determined by laparoscopy  Patient had a hysterectomy in January 2025 in [place]    VTE Pharmacologic Prophylaxis: VTE Score: 1 Low Risk (Score 0-2) - Encourage Ambulation.    Mobility:   Basic Mobility Inpatient Raw Score: 24  [facility] Goal: 8: Walk 250 feet or more  -[facility]  Achieved: 6: Walk 10 steps or more  [facility] Goal NOT achieved. Continue with multidisciplinary rounding and encourage appropriate mobility to improve upon [facility] goals.    Patient Centered Rounds: I performed bedside rounds with nursing staff today.   Discussions with Specialists or Other Care Team Provider: CM, GI note    Education and Discussions with Family / Patient: Patient declined call to .     Current Length of Stay: 1 day(s)  Current Patient Status: Inpatient   Certification Statement: The patient will continue to require additional inpatient hospital stay due to pending improvement of symptoms  Discharge Plan: Anticipate discharge tomorrow to home.    Code Status: Level 1 - Full Code    Subjective   Remains with nausea and constipation.new symptoms of generalized itching without reciprocal rash.    Objective :  Temp:  [98.1 °F (36.7 °C)-98.6 °F (37 °C)] 98.1 °F (36.7 °C)  HR:  [65-91] 86  BP: (110-137)/(72-95) 132/76  Resp:  [16-18] 16  SpO2:  [96 %-98 %] 97 %  O2 Device: None (Room air)    Body mass index is 34.91 kg/m².     Input and Output Summary (last 24 hours):     Intake/Output Summary (Last 24 hours) at 5/2/2025 0923  Last data filed at 5/2/2025 0516  Gross per 24 hour   Intake 480 ml   Output --   Net 480 ml       Physical Exam  Vitals reviewed.   Constitutional:       General: She is not in acute distress.     Appearance: Normal appearance. She is not toxic-appearing.   HENT:      Head: Normocephalic and atraumatic.   Eyes:      General: No scleral icterus.  Cardiovascular:      Rate and Rhythm: Normal rate and regular rhythm.      Heart sounds: Normal heart sounds. No murmur heard.     No friction rub. No gallop.   Pulmonary:      Effort: Pulmonary effort is normal. No respiratory distress.      Breath sounds: Normal breath sounds. No stridor.   Abdominal:      General: Abdomen is flat. Bowel sounds are normal. There is no distension.      Palpations: Abdomen is soft.    Musculoskeletal:         General: Normal range of motion.      Cervical back: Normal range of motion.   Skin:     Coloration: Skin is not jaundiced or pale.   Neurological:      General: No focal deficit present.      Mental Status: She is alert and oriented to person, place, and time. Mental status is at baseline.           Lab Results: I have reviewed the following results:   Results from last 7 days   Lab Units 05/02/25  0444 05/01/25  0432   WBC Thousand/uL 9.25 8.92   HEMOGLOBIN g/dL 13.7 12.9   HEMATOCRIT % 41.2 38.5   PLATELETS Thousands/uL 298 285   SEGS PCT %  --  53   LYMPHO PCT %  --  37   MONO PCT %  --  7   EOS PCT %  --  3     Results from last 7 days   Lab Units 05/02/25  0444   SODIUM mmol/L 137   POTASSIUM mmol/L 3.7   CHLORIDE mmol/L 105   CO2 mmol/L 26   BUN mg/dL 6   CREATININE mg/dL 0.72   ANION GAP mmol/L 6   CALCIUM mg/dL 9.1   ALBUMIN g/dL 3.8   TOTAL BILIRUBIN mg/dL 0.38   ALK PHOS U/L 96   ALT U/L 111*   AST U/L 72*   GLUCOSE RANDOM mg/dL 81     Results from last 7 days   Lab Units 04/30/25  1401   INR  1.02             Results from last 7 days   Lab Units 04/30/25  1142   LACTIC ACID mmol/L 1.0   PROCALCITONIN ng/ml <0.05       Recent Cultures (last 7 days):         Imaging Results Review: No pertinent imaging studies reviewed.  Other Study Results Review: No additional pertinent studies reviewed.    Last 24 Hours Medication List:     Current Facility-Administered Medications:     dicyclomine (BENTYL) capsule 20 mg, 4x Daily (AC & HS)    HYDROcodone-acetaminophen (NORCO) 5-325 mg per tablet 1 tablet, Q6H PRN    hydrOXYzine HCL (ATARAX) tablet 25 mg, Q6H PRN    nicotine (NICODERM CQ) 14 mg/24hr TD 24 hr patch 1 patch, Daily    nortriptyline (PAMELOR) capsule 50 mg, BID    ondansetron (ZOFRAN) injection 4 mg, Q4H PRN    oxyCODONE (ROXICODONE) immediate release tablet 10 mg, Q4H PRN    pantoprazole (PROTONIX) EC tablet 40 mg, BID AC    sodium chloride 0.9 % infusion, Continuous, Last Rate:  75 mL/hr (05/02/25 0516)    Administrative Statements   Today, Patient Was Seen By: Marie Oliva PA-C    **Please Note: This note may have been constructed using a voice recognition system.**

## 2025-05-02 NOTE — PLAN OF CARE
Problem: PAIN - ADULT  Goal: Verbalizes/displays adequate comfort level or baseline comfort level  Description: Interventions:- Encourage patient to monitor pain and request assistance- Assess pain using appropriate pain scale- Administer analgesics based on type and severity of pain and evaluate response- Implement non-pharmacological measures as appropriate and evaluate response- Consider cultural and social influences on pain and pain management- Notify physician/advanced practitioner if interventions unsuccessful or patient reports new pain  Outcome: Progressing     Problem: INFECTION - ADULT  Goal: Absence or prevention of progression during hospitalization  Description: INTERVENTIONS:- Assess and monitor for signs and symptoms of infection- Monitor lab/diagnostic results- Monitor all insertion sites, i.e. indwelling lines, tubes, and drains- Monitor endotracheal if appropriate and nasal secretions for changes in amount and color- [place] appropriate cooling/warming therapies per order- Administer medications as ordered- Instruct and encourage patient and family to use good hand hygiene technique- Identify and instruct in appropriate isolation precautions for identified infection/condition  Outcome: Progressing  Goal: Absence of fever/infection during neutropenic period  Description: INTERVENTIONS:- Monitor WBC  Outcome: Progressing     Problem: SAFETY ADULT  Goal: Patient will remain free of falls  Description: INTERVENTIONS:- Educate patient/family on patient safety including physical limitations- Instruct patient to call for assistance with activity - Consult OT/PT to assist with strengthening/mobility - Keep Call bell within reach- Keep bed low and locked with side rails adjusted as appropriate- Keep care items and personal belongings within reach- Initiate and maintain comfort rounds- Make Fall Risk Sign visible to staff- Offer Toileting every 2 Hours, in advance of need- Initiate/Maintain  Obtain  necessary fall risk management equipment Apply yellow socks and bracelet for high fall risk patients- Consider moving patient to room near nurses station  Outcome: Progressing  Goal: Maintain or return to baseline ADL function  Description: INTERVENTIONS:-  Assess patient's ability to carry out ADLs; assess patient's baseline for ADL function and identify physical deficits which impact ability to perform ADLs (bathing, care of mouth/teeth, toileting, grooming, dressing, etc.)- Assess/evaluate cause of self-care deficits - Assess range of motion- Assess patient's mobility; develop plan if impaired- Assess patient's need for assistive devices and provide as appropriate- Encourage maximum independence but intervene and supervise when necessary- Involve family in performance of ADLs- Assess for home care needs following discharge - Consider OT consult to assist with ADL evaluation and planning for discharge- Provide patient education as appropriate  Outcome: Progressing  Goal: Maintains/Returns to pre admission functional level  Description: INTERVENTIONS:- Perform AM-PAC 6 Click Basic Mobility/ Daily Activity assessment daily.- Set and communicate daily mobility goal to care team and patient/family/caregiver. - Collaborate with rehabilitation services on mobility goals if consulted- Perform Range of Motion 3 times a day.- Reposition patient every 2 hours.- Dangle patient 3 times a day- Stand patient 3 times a day- Ambulate patient 3 times a day- Out of bed to chair 3 times a day - Out of bed for meals 3 times a day- Out of bed for toileting- Record patient progress and toleration of activity level   Outcome: Progressing     Problem: DISCHARGE PLANNING  Goal: Discharge to home or other facility with appropriate resources  Description: INTERVENTIONS:- Identify barriers to discharge w/patient and caregiver- Arrange for needed discharge resources and transportation as appropriate- Identify discharge learning needs (meds,  wound care, etc.)- Arrange for interpretive services to assist at discharge as needed- Refer to Case Management Department for coordinating discharge planning if the patient needs post-hospital services based on physician/advanced practitioner order or complex needs related to functional status, cognitive ability, or social support system  Outcome: Progressing     Problem: Knowledge Deficit  Goal: Patient/family/caregiver demonstrates understanding of disease process, treatment plan, medications, and discharge instructions  Description: Complete learning assessment and assess knowledge base.Interventions:- Provide teaching at level of understanding- Provide teaching via preferred learning methods  Outcome: Progressing     Problem: Nutrition/Hydration-ADULT  Goal: Nutrient/Hydration intake appropriate for improving, restoring or maintaining nutritional needs  Description: Monitor and assess patient's nutrition/hydration status for malnutrition. Collaborate with interdisciplinary team and initiate plan and interventions as ordered.  Monitor patient's weight and dietary intake as ordered or per policy. Utilize nutrition screening tool and intervene as necessary. Determine patient's food preferences and provide high-protein, high-caloric foods as appropriate. INTERVENTIONS:- Monitor oral intake, urinary output, labs, and treatment plans- Assess nutrition and hydration status and recommend course of action- Evaluate amount of meals eaten- Assist patient with eating if necessary - Allow adequate time for meals- Recommend/ encourage appropriate diets, oral nutritional supplements, and vitamin/mineral supplements- Order, calculate, and assess calorie counts as needed- Recommend, monitor, and adjust tube feedings and TPN/PPN based on assessed needs- Assess need for intravenous fluids- Provide specific nutrition/hydration education as appropriate- Include patient/family/caregiver in decisions related to nutrition  Outcome:  Progressing

## 2025-05-02 NOTE — ASSESSMENT & PLAN NOTE
Generalized, not associated with rash or skin discoloration  5/1 night onset  Monitor for symptom progression  PRN atarax.

## 2025-05-03 VITALS
WEIGHT: 234.35 LBS | SYSTOLIC BLOOD PRESSURE: 131 MMHG | TEMPERATURE: 98.9 F | RESPIRATION RATE: 18 BRPM | BODY MASS INDEX: 34.91 KG/M2 | DIASTOLIC BLOOD PRESSURE: 86 MMHG | HEART RATE: 70 BPM | OXYGEN SATURATION: 98 %

## 2025-05-03 LAB
A1AT SERPL-MCNC: 161 MG/DL (ref 100–188)
ACTIN IGG SERPL-ACNC: 3 UNITS (ref 0–19)
ALBUMIN SERPL BCG-MCNC: 3.8 G/DL (ref 3.5–5)
ALP SERPL-CCNC: 98 U/L (ref 34–104)
ALT SERPL W P-5'-P-CCNC: 105 U/L (ref 7–52)
ANION GAP SERPL CALCULATED.3IONS-SCNC: 7 MMOL/L (ref 4–13)
AST SERPL W P-5'-P-CCNC: 54 U/L (ref 13–39)
BILIRUB DIRECT SERPL-MCNC: 0.06 MG/DL (ref 0–0.2)
BILIRUB SERPL-MCNC: 0.35 MG/DL (ref 0.2–1)
BUN SERPL-MCNC: 6 MG/DL (ref 5–25)
CALCIUM SERPL-MCNC: 9.3 MG/DL (ref 8.4–10.2)
CERULOPLASMIN SERPL-MCNC: 29.8 MG/DL (ref 19–39)
CHLORIDE SERPL-SCNC: 106 MMOL/L (ref 96–108)
CO2 SERPL-SCNC: 24 MMOL/L (ref 21–32)
CREAT SERPL-MCNC: 0.76 MG/DL (ref 0.6–1.3)
ERYTHROCYTE [DISTWIDTH] IN BLOOD BY AUTOMATED COUNT: 13 % (ref 11.6–15.1)
GFR SERPL CREATININE-BSD FRML MDRD: 110 ML/MIN/1.73SQ M
GLUCOSE SERPL-MCNC: 87 MG/DL (ref 65–140)
HCT VFR BLD AUTO: 41.6 % (ref 34.8–46.1)
HGB BLD-MCNC: 13.7 G/DL (ref 11.5–15.4)
MAGNESIUM SERPL-MCNC: 1.7 MG/DL (ref 1.9–2.7)
MCH RBC QN AUTO: 28.5 PG (ref 26.8–34.3)
MCHC RBC AUTO-ENTMCNC: 32.9 G/DL (ref 31.4–37.4)
MCV RBC AUTO: 87 FL (ref 82–98)
MITOCHONDRIA M2 IGG SER-ACNC: <20 UNITS (ref 0–20)
PLATELET # BLD AUTO: 299 THOUSANDS/UL (ref 149–390)
PMV BLD AUTO: 9.2 FL (ref 8.9–12.7)
POTASSIUM SERPL-SCNC: 4.2 MMOL/L (ref 3.5–5.3)
PROT SERPL-MCNC: 6.5 G/DL (ref 6.4–8.4)
RBC # BLD AUTO: 4.81 MILLION/UL (ref 3.81–5.12)
SODIUM SERPL-SCNC: 137 MMOL/L (ref 135–147)
WBC # BLD AUTO: 9.07 THOUSAND/UL (ref 4.31–10.16)

## 2025-05-03 PROCEDURE — 80076 HEPATIC FUNCTION PANEL: CPT | Performed by: STUDENT IN AN ORGANIZED HEALTH CARE EDUCATION/TRAINING PROGRAM

## 2025-05-03 PROCEDURE — 86038 ANTINUCLEAR ANTIBODIES: CPT

## 2025-05-03 PROCEDURE — 83735 ASSAY OF MAGNESIUM: CPT

## 2025-05-03 PROCEDURE — 80048 BASIC METABOLIC PNL TOTAL CA: CPT

## 2025-05-03 PROCEDURE — 99239 HOSP IP/OBS DSCHRG MGMT >30: CPT

## 2025-05-03 PROCEDURE — 99232 SBSQ HOSP IP/OBS MODERATE 35: CPT | Performed by: INTERNAL MEDICINE

## 2025-05-03 PROCEDURE — 85027 COMPLETE CBC AUTOMATED: CPT

## 2025-05-03 PROCEDURE — 86225 DNA ANTIBODY NATIVE: CPT

## 2025-05-03 RX ORDER — ONDANSETRON 4 MG/1
4 TABLET, ORALLY DISINTEGRATING ORAL EVERY 6 HOURS PRN
Qty: 30 TABLET | Refills: 0 | Status: SHIPPED | OUTPATIENT
Start: 2025-05-03

## 2025-05-03 RX ORDER — POLYETHYLENE GLYCOL 3350 17 G/17G
17 POWDER, FOR SOLUTION ORAL 2 TIMES DAILY
Status: DISCONTINUED | OUTPATIENT
Start: 2025-05-03 | End: 2025-05-03 | Stop reason: HOSPADM

## 2025-05-03 RX ORDER — DICYCLOMINE HYDROCHLORIDE 10 MG/1
20 CAPSULE ORAL
Qty: 240 CAPSULE | Refills: 0 | Status: SHIPPED | OUTPATIENT
Start: 2025-05-03 | End: 2025-05-06

## 2025-05-03 RX ORDER — LANOLIN ALCOHOL/MO/W.PET/CERES
400 CREAM (GRAM) TOPICAL 2 TIMES DAILY
Status: DISCONTINUED | OUTPATIENT
Start: 2025-05-03 | End: 2025-05-03 | Stop reason: HOSPADM

## 2025-05-03 RX ORDER — LANOLIN ALCOHOL/MO/W.PET/CERES
400 CREAM (GRAM) TOPICAL 2 TIMES DAILY
Qty: 60 TABLET | Refills: 0 | Status: SHIPPED | OUTPATIENT
Start: 2025-05-03

## 2025-05-03 RX ORDER — DOCUSATE SODIUM 100 MG/1
100 CAPSULE, LIQUID FILLED ORAL 2 TIMES DAILY
Qty: 60 CAPSULE | Refills: 0 | Status: SHIPPED | OUTPATIENT
Start: 2025-05-03

## 2025-05-03 RX ADMIN — POLYETHYLENE GLYCOL 3350 17 G: 17 POWDER, FOR SOLUTION ORAL at 11:16

## 2025-05-03 RX ADMIN — NORTRIPTYLINE HYDROCHLORIDE 50 MG: 50 CAPSULE ORAL at 08:12

## 2025-05-03 RX ADMIN — DICYCLOMINE HYDROCHLORIDE 20 MG: 10 CAPSULE ORAL at 11:10

## 2025-05-03 RX ADMIN — SODIUM CHLORIDE 75 ML/HR: 0.9 INJECTION, SOLUTION INTRAVENOUS at 08:13

## 2025-05-03 RX ADMIN — Medication 400 MG: at 12:44

## 2025-05-03 RX ADMIN — DICYCLOMINE HYDROCHLORIDE 20 MG: 10 CAPSULE ORAL at 06:07

## 2025-05-03 RX ADMIN — NICOTINE 1 PATCH: 14 PATCH, EXTENDED RELEASE TRANSDERMAL at 08:13

## 2025-05-03 RX ADMIN — HYDROCODONE BITARTRATE AND ACETAMINOPHEN 1 TABLET: 5; 325 TABLET ORAL at 08:13

## 2025-05-03 RX ADMIN — PANTOPRAZOLE SODIUM 40 MG: 40 TABLET, DELAYED RELEASE ORAL at 06:07

## 2025-05-03 NOTE — ASSESSMENT & PLAN NOTE
RUQ pain onset since EGD 4/25. Related to eating, able to tolerate small meals.  Also with diarrhea fluctuating with constipation.   With h/o endometriosis s/p hysterectomy, previously with lap excision for treatment/dx. Also s/p lap brady. Consider adhesions as factor.  EGD did demonstrate gastritis, hiatal hernia.  Colonoscopy with polyp in transverse colon.  Her liver enzymes are mildly elevated but she states they always are  Continue supportive care, IV fluids, antiemetics, pain medications   GI consult appreciate recommendations  Autoimmune workup pending, follow up with GI outpatient.  Consider functional cause.  Initiate on bentyl QID  Continue TCA  Will need outpatient follow up for NM emptying study  GI follow up closely as well as PCP.

## 2025-05-03 NOTE — NURSING NOTE
Pt expressing concern about an IV insertion site d/t pain she is experiencing. Per pt request note written. Per RN no visible signs of injury.

## 2025-05-03 NOTE — DISCHARGE INSTR - AVS FIRST PAGE
Dear Alexis Jules,     It was our pleasure to care for you here at [facility].  It is our hope that we were always able to exceed the expected standards for your care during your stay.  You were hospitalized due to abdominal pain with eating, constipation.  You were cared for on the 5E floor by Marie Oliva PA-C under the service of Cameron Swanson MD with the [facility] who covers for your primary care physician (PCP), Nahomi Hair, while you were hospitalized.  If you have any questions or concerns related to this hospitalization, you may contact us at .  For follow up as well as any medication refills, we recommend that you follow up with your primary care physician.  A registered nurse will reach out to you by phone within a few days after your discharge to answer any additional questions that you may have after going home.  However, at this time we provide for you here, the most important instructions / recommendations at discharge:     Notable Medication Adjustments -   Increase dicyclomine to every 6 hours  Continue with nortriptyline   Introduce stool softeners to assist with Bms  Continue magnesium supplements.  Testing Required after Discharge -   Emptying study with GI follow up  ** Please contact your PCP to request testing orders for any of the testing recommended here **  Important follow up information -   Follow up with GI and hepatology outside of the hospital to continue workup for possible causes of this abdominal pain.   Please review this entire after visit summary as additional general instructions including medication list, appointments, activity, diet, any pertinent wound care, and other additional recommendations from your care team that may be provided for you.      Sincerely,     Marie Oliva PA-C

## 2025-05-03 NOTE — PLAN OF CARE
Problem: PAIN - ADULT  Goal: Verbalizes/displays adequate comfort level or baseline comfort level  Description: Interventions:- Encourage patient to monitor pain and request assistance- Assess pain using appropriate pain scale- Administer analgesics based on type and severity of pain and evaluate response- Implement non-pharmacological measures as appropriate and evaluate response- Consider cultural and social influences on pain and pain management- Notify physician/advanced practitioner if interventions unsuccessful or patient reports new pain  Outcome: Progressing     Problem: INFECTION - ADULT  Goal: Absence or prevention of progression during hospitalization  Description: INTERVENTIONS:- Assess and monitor for signs and symptoms of infection- Monitor lab/diagnostic results- Monitor all insertion sites, i.e. indwelling lines, tubes, and drains- Monitor endotracheal if appropriate and nasal secretions for changes in amount and color- [place] appropriate cooling/warming therapies per order- Administer medications as ordered- Instruct and encourage patient and family to use good hand hygiene technique- Identify and instruct in appropriate isolation precautions for identified infection/condition  Outcome: Progressing  Goal: Absence of fever/infection during neutropenic period  Description: INTERVENTIONS:- Monitor WBC  Outcome: Progressing     Problem: SAFETY ADULT  Goal: Patient will remain free of falls  Description: INTERVENTIONS:- Educate patient/family on patient safety including physical limitations- Instruct patient to call for assistance with activity - Consult OT/PT to assist with strengthening/mobility - Keep Call bell within reach- Keep bed low and locked with side rails adjusted as appropriate- Keep care items and personal belongings within reach- Initiate and maintain comfort rounds- Make Fall Risk Sign visible to staff- Offer Toileting every 2 Hours, in advance of need- Initiate/Maintain  Obtain  necessary fall risk management equipment Apply yellow socks and bracelet for high fall risk patients- Consider moving patient to room near nurses station  Outcome: Progressing  Goal: Maintain or return to baseline ADL function  Description: INTERVENTIONS:-  Assess patient's ability to carry out ADLs; assess patient's baseline for ADL function and identify physical deficits which impact ability to perform ADLs (bathing, care of mouth/teeth, toileting, grooming, dressing, etc.)- Assess/evaluate cause of self-care deficits - Assess range of motion- Assess patient's mobility; develop plan if impaired- Assess patient's need for assistive devices and provide as appropriate- Encourage maximum independence but intervene and supervise when necessary- Involve family in performance of ADLs- Assess for home care needs following discharge - Consider OT consult to assist with ADL evaluation and planning for discharge- Provide patient education as appropriate  Outcome: Progressing  Goal: Maintains/Returns to pre admission functional level  Description: INTERVENTIONS:- Perform AM-PAC 6 Click Basic Mobility/ Daily Activity assessment daily.- Set and communicate daily mobility goal to care team and patient/family/caregiver. - Collaborate with rehabilitation services on mobility goals if consulted- Perform Range of Motion 3 times a day.- Reposition patient every 2 hours.- Dangle patient 3 times a day- Stand patient 3 times a day- Ambulate patient 3 times a day- Out of bed to chair 3 times a day - Out of bed for meals 3 times a day- Out of bed for toileting- Record patient progress and toleration of activity level   Outcome: Progressing     Problem: DISCHARGE PLANNING  Goal: Discharge to home or other facility with appropriate resources  Description: INTERVENTIONS:- Identify barriers to discharge w/patient and caregiver- Arrange for needed discharge resources and transportation as appropriate- Identify discharge learning needs (meds,  wound care, etc.)- Arrange for interpretive services to assist at discharge as needed- Refer to Case Management Department for coordinating discharge planning if the patient needs post-hospital services based on physician/advanced practitioner order or complex needs related to functional status, cognitive ability, or social support system  Outcome: Progressing     Problem: Knowledge Deficit  Goal: Patient/family/caregiver demonstrates understanding of disease process, treatment plan, medications, and discharge instructions  Description: Complete learning assessment and assess knowledge base.Interventions:- Provide teaching at level of understanding- Provide teaching via preferred learning methods  Outcome: Progressing     Problem: Nutrition/Hydration-ADULT  Goal: Nutrient/Hydration intake appropriate for improving, restoring or maintaining nutritional needs  Description: Monitor and assess patient's nutrition/hydration status for malnutrition. Collaborate with interdisciplinary team and initiate plan and interventions as ordered.  Monitor patient's weight and dietary intake as ordered or per policy. Utilize nutrition screening tool and intervene as necessary. Determine patient's food preferences and provide high-protein, high-caloric foods as appropriate. INTERVENTIONS:- Monitor oral intake, urinary output, labs, and treatment plans- Assess nutrition and hydration status and recommend course of action- Evaluate amount of meals eaten- Assist patient with eating if necessary - Allow adequate time for meals- Recommend/ encourage appropriate diets, oral nutritional supplements, and vitamin/mineral supplements- Order, calculate, and assess calorie counts as needed- Recommend, monitor, and adjust tube feedings and TPN/PPN based on assessed needs- Assess need for intravenous fluids- Provide specific nutrition/hydration education as appropriate- Include patient/family/caregiver in decisions related to nutrition  Outcome:  Progressing     Problem: Prexisting or High Potential for Compromised Skin Integrity  Goal: Skin integrity is maintained or improved  Description: INTERVENTIONS:- Identify patients at risk for skin breakdown- Assess and monitor skin integrity- Assess and monitor nutrition and hydration status- Monitor labs - Assess for incontinence - Turn and reposition patient- Assist with mobility/ambulation- Relieve pressure over bony prominences- Avoid friction and shearing- Provide appropriate hygiene as needed including keeping skin clean and dry- Evaluate need for skin moisturizer/barrier cream- Collaborate with interdisciplinary team - Patient/family teaching- Consider wound care consult   Outcome: Progressing

## 2025-05-03 NOTE — ASSESSMENT & PLAN NOTE
Patient with elevations in LFTs.  ALT greater than AST and mild alkaline phosphatase elevation since 2/28/2025.  Complete serologic liver workup currently ordered but unrevealing at this time.  Suspect a component of underlying MASLD/MASH.  Follow-up with gastroenterology/hepatology outpatient after discharge.

## 2025-05-03 NOTE — PROGRESS NOTES
Progress Note - Gastroenterology   Name: Alexis Jules 24 y.o. female I MRN: [id number]  Unit/Bed#: E5 -01 I Date of Admission: 4/30/2025   Date of Service: 5/3/2025 I Hospital Day: 2    Assessment & Plan  Right upper quadrant abdominal pain  24-year-old female with a PMHx of MDD/VIPIN, OCD, PTSD, migraines, endometriosis S/P MARIANNE 1/2025, and symptomatic cholelithasis S/P ERCP and CCY 2023 who presented with acute on chronic RUQ abdominal pain and poor PO intake with nausea/vomiting.  Hemodynamically stable on admission but patient with minor LFT abnormalities. CT CAP negative along with right upper quadrant ultrasound completed earlier this month.  Patient also with prior EGD/colon 4/25/25 small HH with gastritis. Normal gastric and duodenal biopsies. Colon with mild focal acute colitis in the left colon.    At this time, patient with extensive workup of chronic symptoms.  Findings overall largely unremarkable and etiology unclear at this time.  Labs, imaging, and recent EGD unremarkable.  Differential does include IBS, gastroparesis, viral enteritis, adhesive disease, postcholecystectomy syndrome, and abdominal migraines, or cannabinoid hyperemesis syndrome.  No indication for any repeat procedures or imaging at this time as workup has been thorough up until this point.  Ultimately recommend supportive care and follow-up with gastroenterology as an outpatient.    Plan:  No plan for endoscopic evaluation, okay for diet from GI standpoint  Continue on Bentyl 20 mg QID and Protonix 40 mg twice daily  Recommend MiraLAX 17 g twice daily to help assist in constipation  Continue on nortriptyline 50 mg twice daily  Recommend complete cessation of NSAIDs  Strongly discouraged marijuana use  Outpatient nuclear medicine gastric emptying study ordered  Continue outpatient GI follow-up, will message schedulers to expedite follow-up  Elevated LFTs  Patient with elevations in LFTs.  ALT greater than AST and mild alkaline  phosphatase elevation since 2/28/2025.  Complete serologic liver workup currently ordered but unrevealing at this time.  Suspect a component of underlying MASLD/MASH.  Follow-up with gastroenterology/hepatology outpatient after discharge.    Please contact the SecureChat role for the Gastroenterology service with any questions/concerns.    Subjective  Patient seen and examined at bedside.  Sitting in bedside chair comfortably in no acute distress or visible discomfort.  Continues to complain of abdominal pain.  Admits to ongoing reflux.  Also without bowel movement for numerous days.    Objective :  Temp:  [98.2 °F (36.8 °C)-99.5 °F (37.5 °C)] 98.9 °F (37.2 °C)  HR:  [63-93] 70  BP: (113-131)/(77-86) 131/86  Resp:  [14-18] 18  SpO2:  [94 %-98 %] 98 %  O2 Device: None (Room air)    Physical Exam  Constitutional:       General: She is not in acute distress.     Appearance: She is normal weight. She is not ill-appearing or toxic-appearing.   HENT:      Head: Normocephalic.      Nose: Nose normal. No congestion.   Eyes:      General: No scleral icterus.  Cardiovascular:      Rate and Rhythm: Normal rate.      Pulses: Normal pulses.   Pulmonary:      Effort: Pulmonary effort is normal. No respiratory distress.   Abdominal:      General: Abdomen is flat. There is no distension.      Palpations: Abdomen is soft.      Tenderness: There is abdominal tenderness in the epigastric area. There is no guarding or rebound.   Musculoskeletal:      Cervical back: Normal range of motion.   Skin:     General: Skin is warm.      Capillary Refill: Capillary refill takes less than 2 seconds.      Coloration: Skin is not pale.   Neurological:      Mental Status: She is alert and oriented to person, place, and time. Mental status is at baseline.   Psychiatric:         Mood and Affect: Mood normal.         Behavior: Behavior normal.           Lab Results: I have reviewed the following results:CBC/BMP:   .     05/03/25  0613   WBC 9.07   HGB  13.7   HCT 41.6      SODIUM 137   K 4.2      CO2 24   BUN 6   CREATININE 0.76   GLUC 87   MG 1.7*    , Creatinine Clearance: Estimated Creatinine Clearance: 148.5 mL/min (by C-G formula based on SCr of 0.76 mg/dL)., LFTs:   .     05/03/25  0613   AST 54*   *   ALB 3.8   TBILI 0.35   ALKPHOS 98    , PTT/INR:No new results in last 24 hours.     Imaging Results Review: I reviewed radiology reports from this admission including: CT abdomen/pelvis, procedure reports, and Ultrasound(s).  Other Study Results Review: No additional pertinent studies reviewed.

## 2025-05-03 NOTE — ASSESSMENT & PLAN NOTE
This may be chronic.  Will have GI evaluate her.    CT abdomen pelvis unremarkable liver/biliary tree, s/p cholecystectomy. No acute findings in CAP.  Monitor CMP.  GI consulted, appreciate recommendations.  Hepatocellular injury pattern  Hepatitis panel without acute infection  EBV IgG positive  GGT elevated  Pending autoimmune workup  Follow up with hepatologist outpatient to trend LFTs and continue workup outpatient.

## 2025-05-03 NOTE — ASSESSMENT & PLAN NOTE
24-year-old female with a PMHx of MDD/VIPIN, OCD, PTSD, migraines, endometriosis S/P MARIANNE 1/2025, and symptomatic cholelithasis S/P ERCP and CCY 2023 who presented with acute on chronic RUQ abdominal pain and poor PO intake with nausea/vomiting.  Hemodynamically stable on admission but patient with minor LFT abnormalities. CT CAP negative along with right upper quadrant ultrasound completed earlier this month.  Patient also with prior EGD/colon 4/25/25 small HH with gastritis. Normal gastric and duodenal biopsies. Colon with mild focal acute colitis in the left colon.    At this time, patient with extensive workup of chronic symptoms.  Findings overall largely unremarkable and etiology unclear at this time.  Labs, imaging, and recent EGD unremarkable.  Differential does include IBS, gastroparesis, viral enteritis, adhesive disease, postcholecystectomy syndrome, and abdominal migraines, or cannabinoid hyperemesis syndrome.  No indication for any repeat procedures or imaging at this time as workup has been thorough up until this point.  Ultimately recommend supportive care and follow-up with gastroenterology as an outpatient.    Plan:  No plan for endoscopic evaluation, okay for diet from GI standpoint  Continue on Bentyl 20 mg QID and Protonix 40 mg twice daily  Recommend MiraLAX 17 g twice daily to help assist in constipation  Continue on nortriptyline 50 mg twice daily  Recommend complete cessation of NSAIDs  Strongly discouraged marijuana use  Outpatient nuclear medicine gastric emptying study ordered  Continue outpatient GI follow-up, will message schedulers to expedite follow-up

## 2025-05-03 NOTE — PLAN OF CARE
Problem: PAIN - ADULT  Goal: Verbalizes/displays adequate comfort level or baseline comfort level  Description: Interventions:- Encourage patient to monitor pain and request assistance- Assess pain using appropriate pain scale- Administer analgesics based on type and severity of pain and evaluate response- Implement non-pharmacological measures as appropriate and evaluate response- Consider cultural and social influences on pain and pain management- Notify physician/advanced practitioner if interventions unsuccessful or patient reports new pain  Outcome: Progressing     Problem: INFECTION - ADULT  Goal: Absence or prevention of progression during hospitalization  Description: INTERVENTIONS:- Assess and monitor for signs and symptoms of infection- Monitor lab/diagnostic results- Monitor all insertion sites, i.e. indwelling lines, tubes, and drains- Monitor endotracheal if appropriate and nasal secretions for changes in amount and color- [place] appropriate cooling/warming therapies per order- Administer medications as ordered- Instruct and encourage patient and family to use good hand hygiene technique- Identify and instruct in appropriate isolation precautions for identified infection/condition  Outcome: Progressing  Goal: Absence of fever/infection during neutropenic period  Description: INTERVENTIONS:- Monitor WBC  Outcome: Progressing     Problem: SAFETY ADULT  Goal: Patient will remain free of falls  Description: INTERVENTIONS:- Educate patient/family on patient safety including physical limitations- Instruct patient to call for assistance with activity - Consult OT/PT to assist with strengthening/mobility - Keep Call bell within reach- Keep bed low and locked with side rails adjusted as appropriate- Keep care items and personal belongings within reach- Initiate and maintain comfort rounds- Make Fall Risk Sign visible to staff- Offer Toileting every 2 Hours, in advance of need- Initiate/Maintain  Obtain  necessary fall risk management equipment Apply yellow socks and bracelet for high fall risk patients- Consider moving patient to room near nurses station  Outcome: Progressing  Goal: Maintain or return to baseline ADL function  Description: INTERVENTIONS:-  Assess patient's ability to carry out ADLs; assess patient's baseline for ADL function and identify physical deficits which impact ability to perform ADLs (bathing, care of mouth/teeth, toileting, grooming, dressing, etc.)- Assess/evaluate cause of self-care deficits - Assess range of motion- Assess patient's mobility; develop plan if impaired- Assess patient's need for assistive devices and provide as appropriate- Encourage maximum independence but intervene and supervise when necessary- Involve family in performance of ADLs- Assess for home care needs following discharge - Consider OT consult to assist with ADL evaluation and planning for discharge- Provide patient education as appropriate  Outcome: Progressing  Goal: Maintains/Returns to pre admission functional level  Description: INTERVENTIONS:- Perform AM-PAC 6 Click Basic Mobility/ Daily Activity assessment daily.- Set and communicate daily mobility goal to care team and patient/family/caregiver. - Collaborate with rehabilitation services on mobility goals if consulted- Perform Range of Motion 3 times a day.- Reposition patient every 2 hours.- Dangle patient 3 times a day- Stand patient 3 times a day- Ambulate patient 3 times a day- Out of bed to chair 3 times a day - Out of bed for meals 3 times a day- Out of bed for toileting- Record patient progress and toleration of activity level   Outcome: Progressing     Problem: DISCHARGE PLANNING  Goal: Discharge to home or other facility with appropriate resources  Description: INTERVENTIONS:- Identify barriers to discharge w/patient and caregiver- Arrange for needed discharge resources and transportation as appropriate- Identify discharge learning needs (meds,  wound care, etc.)- Arrange for interpretive services to assist at discharge as needed- Refer to Case Management Department for coordinating discharge planning if the patient needs post-hospital services based on physician/advanced practitioner order or complex needs related to functional status, cognitive ability, or social support system  Outcome: Progressing     Problem: Knowledge Deficit  Goal: Patient/family/caregiver demonstrates understanding of disease process, treatment plan, medications, and discharge instructions  Description: Complete learning assessment and assess knowledge base.Interventions:- Provide teaching at level of understanding- Provide teaching via preferred learning methods  Outcome: Progressing     Problem: Nutrition/Hydration-ADULT  Goal: Nutrient/Hydration intake appropriate for improving, restoring or maintaining nutritional needs  Description: Monitor and assess patient's nutrition/hydration status for malnutrition. Collaborate with interdisciplinary team and initiate plan and interventions as ordered.  Monitor patient's weight and dietary intake as ordered or per policy. Utilize nutrition screening tool and intervene as necessary. Determine patient's food preferences and provide high-protein, high-caloric foods as appropriate. INTERVENTIONS:- Monitor oral intake, urinary output, labs, and treatment plans- Assess nutrition and hydration status and recommend course of action- Evaluate amount of meals eaten- Assist patient with eating if necessary - Allow adequate time for meals- Recommend/ encourage appropriate diets, oral nutritional supplements, and vitamin/mineral supplements- Order, calculate, and assess calorie counts as needed- Recommend, monitor, and adjust tube feedings and TPN/PPN based on assessed needs- Assess need for intravenous fluids- Provide specific nutrition/hydration education as appropriate- Include patient/family/caregiver in decisions related to nutrition  Outcome:  Progressing     Problem: Prexisting or High Potential for Compromised Skin Integrity  Goal: Skin integrity is maintained or improved  Description: INTERVENTIONS:- Identify patients at risk for skin breakdown- Assess and monitor skin integrity- Assess and monitor nutrition and hydration status- Monitor labs - Assess for incontinence - Turn and reposition patient- Assist with mobility/ambulation- Relieve pressure over bony prominences- Avoid friction and shearing- Provide appropriate hygiene as needed including keeping skin clean and dry- Evaluate need for skin moisturizer/barrier cream- Collaborate with interdisciplinary team - Patient/family teaching- Consider wound care consult   Outcome: Progressing

## 2025-05-03 NOTE — DISCHARGE SUMMARY
Discharge Summary - Hospitalist   Name: Alexis Jules 24 y.o. female I MRN: [id number]  Unit/Bed#: E5 -01 I Date of Admission: 4/30/2025   Date of Service: 5/3/2025 I Hospital Day: 2     Assessment & Plan  Right upper quadrant abdominal pain  RUQ pain onset since EGD 4/25. Related to eating, able to tolerate small meals.  Also with diarrhea fluctuating with constipation.   With h/o endometriosis s/p hysterectomy, previously with lap excision for treatment/dx. Also s/p lap brady. Consider adhesions as factor.  EGD did demonstrate gastritis, hiatal hernia.  Colonoscopy with polyp in transverse colon.  Her liver enzymes are mildly elevated but she states they always are  Continue supportive care, IV fluids, antiemetics, pain medications   GI consult appreciate recommendations  Autoimmune workup pending, follow up with GI outpatient.  Consider functional cause.  Initiate on bentyl QID  Continue TCA  Will need outpatient follow up for NM emptying study  GI follow up closely as well as PCP.  Elevated LFTs  This may be chronic.  Will have GI evaluate her.    CT abdomen pelvis unremarkable liver/biliary tree, s/p cholecystectomy. No acute findings in CAP.  Monitor CMP.  GI consulted, appreciate recommendations.  Hepatocellular injury pattern  Hepatitis panel without acute infection  EBV IgG positive  GGT elevated  Pending autoimmune workup  Follow up with hepatologist outpatient to trend LFTs and continue workup outpatient.  Itching  Generalized, not associated with rash or skin discoloration  5/1 night onset  Monitor for symptom progression  PRN atarax.  Dizziness  H/o POTS and nonepileptic seizures with aura  Is complaining of positional dizziness starting yesterday  Monitor symptoms.  POTS (postural orthostatic tachycardia syndrome)  Sees a specialist  Continue with home compression stockings.  Endometriosis determined by laparoscopy  Patient had a hysterectomy in January 2025 in [facility]        Resolved Problems  Date Reviewed: 4/2/2025   None       Discharging Physician / Practitioner: Marie Oliva PA-C  PCP: Nahomi Hair  Admission Date:   Admission Orders (From admission, onward)       Ordered        05/01/25 1504  INPATIENT ADMISSION  Once            04/30/25 1607  Place in Observation  Once                          Discharge Date: 05/03/25    Consultations During Hospital Stay:  GI    Procedures Performed:   CT chest abdomen pelvis w contrast  Result Date: 4/30/2025  Impression: No acute findings in the chest, abdomen or pelvis. Workstation performed: [id number]     Significant Findings / Test Results:   EBV IgG +, IgM negative indicated past infection    Incidental Findings:        Test Results Pending at Discharge (will require follow up):   Labs per GI follow up in office     Outpatient Tests Requested:  Emptying study NM    Complications:  None    Reason for Admission: Abdominal pain    Hospital Course:   Alexis Jules is a 24 y.o. female patient who originally presented to the hospital on 4/30/2025 Abdominal Pain (Pt with mid abdominal pain radiating into her back with nausea x5 days. Pt had EGD and colonoscopy last Friday and symptoms began after. Pt states this morning she had low grade fever, 99.9. Pt contacted GI who advised she be seen in the ED.) PMH chronic abdominal pain, endometriosis status post hysterectomy, status post cholecystectomy who presents with ongoing abdominal pain.  She states she had a EGD colonoscopy last week.  This showed gastritis.  She states since then she has had right upper quadrant abdominal pain.  It comes and goes.  Worsened postprandium.  It is better right now after IV fentanyl in the emergency room.  She does follow with GI as an outpatient.  She has been evaluated at multiple facilities for this abdominal pain and no one can figure out what is causing it.    Workup in the hospital was unrevealing. CT AP was unremarkable. GI consulted and following  patient suggesting functional abdominal pain. Suggesting OP follow up for emptying study to evaluate for poss gastroparesis. Patient remains with symptoms on discharge but is able to tolerate meals in small amounts.    No notes on file    Please see above list of diagnoses and related plan for additional information.     Condition at Discharge: stable    Discharge Day Visit / Exam:   Subjective:  Remains with abdominal pain. Not vomiting. Not passing Bms, is afraid of stool softeners given recent prep.  Vitals: Blood Pressure: 131/86 (05/03/25 0731)  Pulse: 70 (05/03/25 0731)  Temperature: 98.9 °F (37.2 °C) (05/03/25 0731)  Temp Source: Oral (05/03/25 0731)  Respirations: 18 (05/03/25 0731)  Weight - Scale: 106 kg (234 lb 5.6 oz) (04/30/25 1700)  SpO2: 98 % (05/03/25 0731)  Physical Exam  Vitals reviewed.   Constitutional:       General: She is not in acute distress.     Appearance: Normal appearance. She is not toxic-appearing.   HENT:      Head: Normocephalic and atraumatic.   Eyes:      General: No scleral icterus.  Cardiovascular:      Rate and Rhythm: Normal rate and regular rhythm.      Heart sounds: Normal heart sounds.   Pulmonary:      Effort: Pulmonary effort is normal. No respiratory distress.      Breath sounds: Normal breath sounds. No stridor.   Abdominal:      General: Abdomen is flat. Bowel sounds are normal. There is no distension.      Palpations: Abdomen is soft.   Musculoskeletal:         General: Normal range of motion.      Cervical back: Normal range of motion.      Right lower leg: No edema.      Left lower leg: No edema.   Skin:     Coloration: Skin is not jaundiced or pale.   Neurological:      General: No focal deficit present.      Mental Status: She is alert and oriented to person, place, and time. Mental status is at baseline.          Discussion with Family: Patient declined call to .     Discharge instructions/Information to patient and family:   See after visit summary  for information provided to patient and family.      Provisions for Follow-Up Care:  See after visit summary for information related to follow-up care and any pertinent home health orders.      Mobility at time of Discharge:   Basic Mobility Inpatient Raw Score: 23  JH-HLM Goal: 7: Walk 25 feet or more  JH-HLM Achieved: 7: Walk 25 feet or more  HLM Goal achieved. Continue to encourage appropriate mobility.     Disposition:   Home    Planned Readmission: No    Discharge Medications:  See after visit summary for reconciled discharge medications provided to patient and/or family.      Administrative Statements   Discharge Statement:  I have spent a total time of 45 minutes in caring for this patient on the day of the visit/encounter. >30 minutes of time was spent on: Diagnostic results, Prognosis, Risks and benefits of tx options, Instructions for management, Patient and family education, Counseling / Coordination of care, Documenting in the medical record, Reviewing / ordering tests, medicine, procedures  , and Communicating with other healthcare professionals .    **Please Note: This note may have been constructed using a voice recognition system**

## 2025-05-03 NOTE — PLAN OF CARE
Problem: PAIN - ADULT  Goal: Verbalizes/displays adequate comfort level or baseline comfort level  Description: Interventions:- Encourage patient to monitor pain and request assistance- Assess pain using appropriate pain scale- Administer analgesics based on type and severity of pain and evaluate response- Implement non-pharmacological measures as appropriate and evaluate response- Consider cultural and social influences on pain and pain management- Notify physician/advanced practitioner if interventions unsuccessful or patient reports new pain  5/3/2025 1333 by Christiano Ashton RN  Outcome: Adequate for Discharge     Problem: INFECTION - ADULT  Goal: Absence or prevention of progression during hospitalization  Description: INTERVENTIONS:- Assess and monitor for signs and symptoms of infection- Monitor lab/diagnostic results- Monitor all insertion sites, i.e. indwelling lines, tubes, and drains- Monitor endotracheal if appropriate and nasal secretions for changes in amount and color- [place] appropriate cooling/warming therapies per order- Administer medications as ordered- Instruct and encourage patient and family to use good hand hygiene technique- Identify and instruct in appropriate isolation precautions for identified infection/condition  5/3/2025 1333 by Christiano Ashton RN  Outcome: Adequate for Discharge     Problem: INFECTION - ADULT  Goal: Absence of fever/infection during neutropenic period  Description: INTERVENTIONS:- Monitor WBC  5/3/2025 1333 by Christiano Ashton RN  Outcome: Adequate for Discharge     Problem: SAFETY ADULT  Goal: Patient will remain free of falls  Description: INTERVENTIONS:- Educate patient/family on patient safety including physical limitations- Instruct patient to call for assistance with activity - Consult OT/PT to assist with strengthening/mobility - Keep Call bell within reach- Keep bed low and locked with side rails adjusted as appropriate- Keep care items and personal belongings  within reach- Initiate and maintain comfort rounds- Make Fall Risk Sign visible to staff- Offer Toileting every 2 Hours, in advance of need- Initiate/Maintain  Obtain necessary fall risk management equipment Apply yellow socks and bracelet for high fall risk patients- Consider moving patient to room near nurses station  5/3/2025 1333 by Christiano Ashton RN  Outcome: Adequate for Discharge     Problem: SAFETY ADULT  Goal: Maintain or return to baseline ADL function  Description: INTERVENTIONS:-  Assess patient's ability to carry out ADLs; assess patient's baseline for ADL function and identify physical deficits which impact ability to perform ADLs (bathing, care of mouth/teeth, toileting, grooming, dressing, etc.)- Assess/evaluate cause of self-care deficits - Assess range of motion- Assess patient's mobility; develop plan if impaired- Assess patient's need for assistive devices and provide as appropriate- Encourage maximum independence but intervene and supervise when necessary- Involve family in performance of ADLs- Assess for home care needs following discharge - Consider OT consult to assist with ADL evaluation and planning for discharge- Provide patient education as appropriate  5/3/2025 1333 by Christiano Ashton RN  Outcome: Adequate for Discharge     Problem: SAFETY ADULT  Goal: Maintains/Returns to pre admission functional level  Description: INTERVENTIONS:- Perform AM-PAC 6 Click Basic Mobility/ Daily Activity assessment daily.- Set and communicate daily mobility goal to care team and patient/family/caregiver. - Collaborate with rehabilitation services on mobility goals if consulted- Perform Range of Motion 3 times a day.- Reposition patient every 2 hours.- Dangle patient 3 times a day- Stand patient 3 times a day- Ambulate patient 3 times a day- Out of bed to chair 3 times a day - Out of bed for meals 3 times a day- Out of bed for toileting- Record patient progress and toleration of activity level   5/3/2025  1333 by Christiano Ashton RN  Outcome: Adequate for Discharge     Problem: DISCHARGE PLANNING  Goal: Discharge to home or other facility with appropriate resources  Description: INTERVENTIONS:- Identify barriers to discharge w/patient and caregiver- Arrange for needed discharge resources and transportation as appropriate- Identify discharge learning needs (meds, wound care, etc.)- Arrange for interpretive services to assist at discharge as needed- Refer to Case Management Department for coordinating discharge planning if the patient needs post-hospital services based on physician/advanced practitioner order or complex needs related to functional status, cognitive ability, or social support system  5/3/2025 1333 by Christiano Ashton RN  Outcome: Adequate for Discharge     Problem: Knowledge Deficit  Goal: Patient/family/caregiver demonstrates understanding of disease process, treatment plan, medications, and discharge instructions  Description: Complete learning assessment and assess knowledge base.Interventions:- Provide teaching at level of understanding- Provide teaching via preferred learning methods  5/3/2025 1333 by Christiano Ashton RN  Outcome: Adequate for Discharge     Problem: Nutrition/Hydration-ADULT  Goal: Nutrient/Hydration intake appropriate for improving, restoring or maintaining nutritional needs  Description: Monitor and assess patient's nutrition/hydration status for malnutrition. Collaborate with interdisciplinary team and initiate plan and interventions as ordered.  Monitor patient's weight and dietary intake as ordered or per policy. Utilize nutrition screening tool and intervene as necessary. Determine patient's food preferences and provide high-protein, high-caloric foods as appropriate. INTERVENTIONS:- Monitor oral intake, urinary output, labs, and treatment plans- Assess nutrition and hydration status and recommend course of action- Evaluate amount of meals eaten- Assist patient with eating if  necessary - Allow adequate time for meals- Recommend/ encourage appropriate diets, oral nutritional supplements, and vitamin/mineral supplements- Order, calculate, and assess calorie counts as needed- Recommend, monitor, and adjust tube feedings and TPN/PPN based on assessed needs- Assess need for intravenous fluids- Provide specific nutrition/hydration education as appropriate- Include patient/family/caregiver in decisions related to nutrition  5/3/2025 1333 by Christiano Ashton RN  Outcome: Adequate for Discharge     Problem: Prexisting or High Potential for Compromised Skin Integrity  Goal: Skin integrity is maintained or improved  Description: INTERVENTIONS:- Identify patients at risk for skin breakdown- Assess and monitor skin integrity- Assess and monitor nutrition and hydration status- Monitor labs - Assess for incontinence - Turn and reposition patient- Assist with mobility/ambulation- Relieve pressure over bony prominences- Avoid friction and shearing- Provide appropriate hygiene as needed including keeping skin clean and dry- Evaluate need for skin moisturizer/barrier cream- Collaborate with interdisciplinary team - Patient/family teaching- Consider wound care consult   5/3/2025 1333 by Christiano Ashton RN  Outcome: Adequate for Discharge

## 2025-05-04 LAB
DSDNA IGG SERPL IA-ACNC: 1.2 IU/ML (ref ?–15)
NUCLEAR IGG SER IA-RTO: 0.3 RATIO (ref ?–1)

## 2025-05-05 ENCOUNTER — TRANSITIONAL CARE MANAGEMENT (OUTPATIENT)
Age: 25
End: 2025-05-05

## 2025-05-05 ENCOUNTER — CONSULT (OUTPATIENT)
Dept: NEUROLOGY | Facility: CLINIC | Age: 25
End: 2025-05-05
Payer: MEDICARE

## 2025-05-05 VITALS
SYSTOLIC BLOOD PRESSURE: 110 MMHG | OXYGEN SATURATION: 100 % | DIASTOLIC BLOOD PRESSURE: 82 MMHG | BODY MASS INDEX: 36.57 KG/M2 | HEART RATE: 97 BPM | WEIGHT: 233 LBS | HEIGHT: 67 IN

## 2025-05-05 DIAGNOSIS — F43.10 POSTTRAUMATIC STRESS DISORDER: ICD-10-CM

## 2025-05-05 DIAGNOSIS — F44.5 PSYCHOGENIC NONEPILEPTIC SEIZURE: Primary | ICD-10-CM

## 2025-05-05 DIAGNOSIS — G90.A POTS (POSTURAL ORTHOSTATIC TACHYCARDIA SYNDROME): ICD-10-CM

## 2025-05-05 DIAGNOSIS — G40.89 OTHER SEIZURES (HCC): ICD-10-CM

## 2025-05-05 PROCEDURE — 99417 PROLNG OP E/M EACH 15 MIN: CPT | Performed by: PSYCHIATRY & NEUROLOGY

## 2025-05-05 PROCEDURE — 99245 OFF/OP CONSLTJ NEW/EST HI 55: CPT | Performed by: PSYCHIATRY & NEUROLOGY

## 2025-05-05 NOTE — PROGRESS NOTES
Review of Systems   Constitutional:  Negative for appetite change, fatigue and fever.   HENT: Negative.  Negative for hearing loss, tinnitus, trouble swallowing and voice change.    Eyes: Negative.  Negative for photophobia, pain and visual disturbance.   Respiratory: Negative.  Negative for shortness of breath.    Cardiovascular: Negative.  Negative for palpitations.   Gastrointestinal: Negative.  Negative for nausea and vomiting.   Endocrine: Negative.  Negative for cold intolerance.   Genitourinary: Negative.  Negative for dysuria, frequency and urgency.   Musculoskeletal:  Negative for back pain, gait problem, myalgias, neck pain and neck stiffness.   Skin: Negative.  Negative for rash.   Allergic/Immunologic: Negative.    Neurological:  Positive for seizures (week ago). Negative for dizziness, tremors, syncope, facial asymmetry, speech difficulty, weakness, light-headedness, numbness and headaches.   Hematological: Negative.  Does not bruise/bleed easily.   Psychiatric/Behavioral: Negative.  Negative for confusion, hallucinations and sleep disturbance.
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
## 2025-05-05 NOTE — PATIENT INSTRUCTIONS
-- please get an MRI of your brain    -- I will get a routine EEG and when this is completed, we will arrange for you to be admitted to the Epilepsy Monitoring Unit to capture your events.

## 2025-05-05 NOTE — UTILIZATION REVIEW
NOTIFICATION OF ADMISSION DISCHARGE   This is a Notification of Discharge from [facility]. Please be advised that this patient has been discharge from our facility. Below you will find the admission and discharge date and time including the patient’s disposition.   UTILIZATION REVIEW CONTACT:  Utilization Review Assistants  Network Utilization Review Department  Phone: [phone] x carefully listen to the prompts. All voicemails are confidential.  Email: [email or web address]     ADMISSION INFORMATION  PRESENTATION DATE: 4/30/2025 11:00 AM  OBERVATION ADMISSION DATE: 04/30/2025 1607  INPATIENT ADMISSION DATE: 5/1/25  3:04 PM   DISCHARGE DATE: 5/3/2025  2:00 PM   DISPOSITION:Home/Self Care    Network Utilization Review Department  ATTENTION: Please call with any questions or concerns to [phone] and carefully listen to the prompts so that you are directed to the right person. All voicemails are confidential.   For Discharge needs, contact Care Management DC Support Team at [phone] opt. 2  Send all requests for admission clinical reviews, approved or denied determinations and any other requests to dedicated fax number below belonging to the campus where the patient is receiving treatment. List of dedicated fax numbers for the Facilities:  FACILITY NAME UR FAX NUMBER   ADMISSION DENIALS (Administrative/Medical Necessity) [phone]   DISCHARGE SUPPORT TEAM ([facility]) [phone]   PARENT CHILD HEALTH (Maternity/NICU/Pediatrics) [phone]   [facility] [phone]   [facility] [phone]   [facility] [phone]   [facility] [phone]   [facility] [phone]   [facility] [phone]   [facility] [phone]   [facility] [phone]   [facility]  [facility] [phone]   [facility] [phone]   [facility] [phone]   [facility] [phone]

## 2025-05-06 ENCOUNTER — OFFICE VISIT (OUTPATIENT)
Age: 25
End: 2025-05-06
Payer: MEDICARE

## 2025-05-06 ENCOUNTER — LAB (OUTPATIENT)
Age: 25
End: 2025-05-06
Attending: PHYSICIAN ASSISTANT
Payer: MEDICARE

## 2025-05-06 ENCOUNTER — E-CONSULT (OUTPATIENT)
Age: 25
End: 2025-05-06
Payer: MEDICARE

## 2025-05-06 ENCOUNTER — TELEPHONE (OUTPATIENT)
Age: 25
End: 2025-05-06

## 2025-05-06 VITALS
OXYGEN SATURATION: 98 % | BODY MASS INDEX: 37.23 KG/M2 | DIASTOLIC BLOOD PRESSURE: 70 MMHG | SYSTOLIC BLOOD PRESSURE: 108 MMHG | HEIGHT: 67 IN | HEART RATE: 118 BPM | WEIGHT: 237.22 LBS

## 2025-05-06 DIAGNOSIS — B27.00 POSITIVE TEST FOR EPSTEIN-BARR VIRUS (EBV): Primary | ICD-10-CM

## 2025-05-06 DIAGNOSIS — B27.90 EPSTEIN BARR VIRUS INFECTION: ICD-10-CM

## 2025-05-06 DIAGNOSIS — R79.89 ELEVATED LFTS: ICD-10-CM

## 2025-05-06 DIAGNOSIS — R79.0 LOW MAGNESIUM LEVEL: ICD-10-CM

## 2025-05-06 DIAGNOSIS — G90.A POTS (POSTURAL ORTHOSTATIC TACHYCARDIA SYNDROME): ICD-10-CM

## 2025-05-06 DIAGNOSIS — Z78.9 TRANSITION OF CARE: Primary | ICD-10-CM

## 2025-05-06 DIAGNOSIS — Z78.9 TRANSITION OF CARE: ICD-10-CM

## 2025-05-06 DIAGNOSIS — R10.11 RIGHT UPPER QUADRANT ABDOMINAL PAIN: ICD-10-CM

## 2025-05-06 DIAGNOSIS — Z20.828 EXPOSURE TO EPSTEIN-BARR VIRUS: ICD-10-CM

## 2025-05-06 LAB
ALBUMIN SERPL BCG-MCNC: 4.1 G/DL (ref 3.5–5)
ALP SERPL-CCNC: 104 U/L (ref 34–104)
ALT SERPL W P-5'-P-CCNC: 70 U/L (ref 7–52)
ANION GAP SERPL CALCULATED.3IONS-SCNC: 9 MMOL/L (ref 4–13)
AST SERPL W P-5'-P-CCNC: 24 U/L (ref 13–39)
BILIRUB DIRECT SERPL-MCNC: 0.02 MG/DL (ref 0–0.2)
BILIRUB SERPL-MCNC: 0.22 MG/DL (ref 0.2–1)
BUN SERPL-MCNC: 8 MG/DL (ref 5–25)
CALCIUM SERPL-MCNC: 9.4 MG/DL (ref 8.4–10.2)
CHLORIDE SERPL-SCNC: 105 MMOL/L (ref 96–108)
CO2 SERPL-SCNC: 24 MMOL/L (ref 21–32)
CREAT SERPL-MCNC: 0.57 MG/DL (ref 0.6–1.3)
GFR SERPL CREATININE-BSD FRML MDRD: 131 ML/MIN/1.73SQ M
GLUCOSE P FAST SERPL-MCNC: 84 MG/DL (ref 65–99)
MAGNESIUM SERPL-MCNC: 1.7 MG/DL (ref 1.9–2.7)
POTASSIUM SERPL-SCNC: 4 MMOL/L (ref 3.5–5.3)
PROT SERPL-MCNC: 6.8 G/DL (ref 6.4–8.4)
SODIUM SERPL-SCNC: 138 MMOL/L (ref 135–147)

## 2025-05-06 PROCEDURE — 99447 NTRPROF PH1/NTRNET/EHR 11-20: CPT | Performed by: INTERNAL MEDICINE

## 2025-05-06 PROCEDURE — 80053 COMPREHEN METABOLIC PANEL: CPT

## 2025-05-06 PROCEDURE — 99495 TRANSJ CARE MGMT MOD F2F 14D: CPT

## 2025-05-06 PROCEDURE — 82248 BILIRUBIN DIRECT: CPT

## 2025-05-06 PROCEDURE — 36415 COLL VENOUS BLD VENIPUNCTURE: CPT

## 2025-05-06 PROCEDURE — 83735 ASSAY OF MAGNESIUM: CPT

## 2025-05-06 NOTE — PROGRESS NOTES
E-Consult  Alexis Jules 24 y.o. female MRN: [id number]  Encounter Date: 05/06/25      Reason for Consult / Principal Problem: Positive EBV serologies.    Consulting Provider: Jeff Mcmahon MD    Requesting Provider: Nahomi Hair, *       ASSESSMENT:  Positive EBV serologies, with IgG positive and IgM negative, consistent with old infection.  Per chart review, patient has history of infectious mononucleosis.  Given the EBV is the most common virus etiology of infectious mononucleosis, this is consistent with finding of old EBV infection serologies.    RECOMMENDATIONS:  No additional workup necessary.    I discussed the above recommendation with patient's PCP, Nahmoi Hair, via epic PACS.    Total time spent 11-20 minutes, >50% of the total time devoted to medical consultative verbal/EMR discussion between providers. Written report will be generated in the EMR..

## 2025-05-06 NOTE — TELEPHONE ENCOUNTER
----- Message from Sanchez Edge MD sent at 5/1/2025  3:19 PM EDT -----  Regarding: f/u appt  Hey can we schedule this lady for a f/u appt? To discuss elevated LFTs. Thx

## 2025-05-06 NOTE — PROGRESS NOTES
Name: Alexis Jules      : 2000      MRN: [id number]  Encounter Provider: Nahomi Hair  Encounter Date: 2025   Encounter department: [facility]  :  Assessment & Plan  Transition of care  Patient was in the hospital for right upper quadrant abdominal pain and was discharged on 2025    Patient also reporting on and off fevers and chronic elevated LFTs'     YARON-negative  Lymes- negative     Patient has an appointment with GI which isnt until 2025- patient is on the wait list for a sooner appointment     Neurology appointment yesterday and wants to go a EEG and brain MRI    Cardio appointment isn't until 2025     F/u in 2 weeks- educated on s/s of when to follow up sooner or go to the ED    Repeat labs ordered   POTS (postural orthostatic tachycardia syndrome)  Pt reports that she is having a lot of dizziness, chest tightness and palpitations; cardiology appointment isn't until 2025    Educated on s/s of when to go to the ED- patient verbalized understanding     POCT EKG showed- tachycardiac but otherwise NSR   Orders:    Echo complete w/ contrast if indicated; Future    POCT ECG    Right upper quadrant abdominal pain  Pt reports that she still have symptoms; educated on s/s of when to go to the ED- patient verbalized understanding   Low magnesium level  Repeat labs ordered   Orders:    Magnesium; Future    Exposure to Janet-Barr virus    Orders:    AMB E-CONSULT TO INFECTIOUS DISEASE    Janet Barr virus infection  Reviewed results of blood work- E-consult placed for ID for additional resources for plan of care;     Janet Vila VCA IgM- negative   Janet Vila VCA IgG- positive  Janet Vila VCA Antigen IgG- positive    Patient repots mono infection in the past    Elevated LFTs  Repeat LFTs ordered- patient has a follow up appointment with GI in August- patient is on a wait list for sooner appointment     Hep panel was normal      History of Present  "Illness   HPI  Alexis Jules is a 24 year old female was in the hospital for right upper quadrant abdominal pain and was discharged on 04/30/2025    Patient also reporting on and off fevers and chronic elevated LFTs'     Patient has an appointment with GI which isnt until August 2025- patient is on the wait list for a sooner appointment     Neurology appointment yesterday and wants to go a EEG and brain MRI    Cardio appointment isn't until August 2025     F/u in 2 weeks- educated on s/s of when to follow up sooner or go to the ED    Repeat labs ordered   Review of Systems   Constitutional:  Negative for chills and fever.   HENT:  Negative for ear pain and sore throat.    Eyes:  Negative for pain and visual disturbance.   Respiratory:  Negative for cough and shortness of breath.    Cardiovascular:  Negative for chest pain and palpitations.   Gastrointestinal:  Positive for abdominal pain. Negative for vomiting.   Musculoskeletal:  Negative for arthralgias and back pain.   Skin:  Negative for color change and rash.   Neurological:  Negative for dizziness, syncope and headaches.   All other systems reviewed and are negative.      Objective   /70 (BP Location: Left arm, Patient Position: Sitting, Cuff Size: Large)   Pulse (!) 118   Ht 5' 7\" (1.702 m)   Wt 108 kg (237 lb 3.4 oz)   SpO2 98%   BMI 37.15 kg/m²      Physical Exam  Vitals and nursing note reviewed.   Constitutional:       General: She is not in acute distress.     Appearance: She is well-developed.   HENT:      Head: Normocephalic and atraumatic.   Eyes:      Conjunctiva/sclera: Conjunctivae normal.   Cardiovascular:      Rate and Rhythm: Normal rate and regular rhythm.      Heart sounds: No murmur heard.  Pulmonary:      Effort: Pulmonary effort is normal. No respiratory distress.      Breath sounds: Normal breath sounds.   Musculoskeletal:         General: No swelling.      Cervical back: Neck supple.   Skin:     General: Skin is warm and dry. "   Neurological:      Mental Status: She is alert.   Psychiatric:         Mood and Affect: Mood normal.

## 2025-05-06 NOTE — ASSESSMENT & PLAN NOTE
Repeat LFTs ordered- patient has a follow up appointment with GI in August- patient is on a wait list for sooner appointment

## 2025-05-06 NOTE — ASSESSMENT & PLAN NOTE
Pt reports that she still have symptoms; educated on s/s of when to go to the ED- patient verbalized understanding

## 2025-05-06 NOTE — TELEPHONE ENCOUNTER
"Spoke directly with Pt today via phone call @ [phone].  Pt informed that Pt has an appointment with Dr. Jane Strange on 8/13/25 at the [address] [place] [address] office location.  Pt further informed that Pt's name has been placed on office \"waiting list\" should a sooner appointment become available.  Appointment letter mailed to Pt's address in chart per Pt's request.  Pt given office phone number should Pt need to reschedule appointment and/or have any questions.  "

## 2025-05-06 NOTE — ASSESSMENT & PLAN NOTE
Pt reports that she is having a lot of dizziness, chest tightness and palpitations; cardiology appointment isn't until 08/2025    Educated on s/s of when to go to the ED- patient verbalized understanding     POCT EKG showed- tachycardiac but otherwise NSR   Orders:    Echo complete w/ contrast if indicated; Future    POCT ECG

## 2025-05-07 ENCOUNTER — RESULTS FOLLOW-UP (OUTPATIENT)
Age: 25
End: 2025-05-07

## 2025-05-07 ENCOUNTER — TELEPHONE (OUTPATIENT)
Dept: NEUROLOGY | Facility: CLINIC | Age: 25
End: 2025-05-07

## 2025-05-07 ENCOUNTER — RESULTS FOLLOW-UP (OUTPATIENT)
Dept: GASTROENTEROLOGY | Facility: CLINIC | Age: 25
End: 2025-05-07

## 2025-05-07 DIAGNOSIS — R79.89 ELEVATED LFTS: ICD-10-CM

## 2025-05-07 DIAGNOSIS — F44.5 PSYCHOGENIC NONEPILEPTIC SEIZURE: Primary | ICD-10-CM

## 2025-05-07 DIAGNOSIS — R79.0 LOW MAGNESIUM LEVEL: Primary | ICD-10-CM

## 2025-05-07 NOTE — TELEPHONE ENCOUNTER
EMU referral in work queue. Patient requires routine EEG first. Currently scheduled for 5/12/2025.

## 2025-05-12 ENCOUNTER — HOSPITAL ENCOUNTER (OUTPATIENT)
Dept: NEUROLOGY | Facility: CLINIC | Age: 25
Discharge: HOME | End: 2025-05-12
Attending: PSYCHIATRY & NEUROLOGY
Payer: MEDICARE

## 2025-05-12 DIAGNOSIS — F44.5 PSYCHOGENIC NONEPILEPTIC SEIZURE: ICD-10-CM

## 2025-05-12 DIAGNOSIS — F43.10 POSTTRAUMATIC STRESS DISORDER: ICD-10-CM

## 2025-05-12 DIAGNOSIS — G40.89 OTHER SEIZURES (HCC): ICD-10-CM

## 2025-05-12 DIAGNOSIS — G90.A POTS (POSTURAL ORTHOSTATIC TACHYCARDIA SYNDROME): ICD-10-CM

## 2025-05-12 PROCEDURE — 95816 EEG AWAKE AND DROWSY: CPT

## 2025-05-12 PROCEDURE — 95816 EEG AWAKE AND DROWSY: CPT | Performed by: PSYCHIATRY & NEUROLOGY

## 2025-05-14 NOTE — TELEPHONE ENCOUNTER
Spoke to patient regarding EMU admission. Added to schedule for first available - 7/21/2025 9am. ADT21 entered. Added to precert spreadsheet. Notified precert via [place] of admission. EMU education sent to patient via [facility]ient.     All questions answered at this time regarding admission.    Precert - EMU admission for 7/21/2025

## 2025-05-20 NOTE — ASSESSMENT & PLAN NOTE
She has been having events of loss of consciousness concerning for possible POTS. We will be benjamin to help assess and characterize these events while in the EMU as well. There could be consideration of getting an EEG with Tilt table while inpatient to try to trigger an event.     Orders:    MRI brain seizure wo contrast; Future    EEG awake or drowsy routine; Future    Epilepsy Monitoring Unit (EMU) Referral; Future

## 2025-05-20 NOTE — ASSESSMENT & PLAN NOTE
We overall extensively discussed her prior tests and the etiology of her events. She was previously diagnosed with nonepileptic psychogenic spells. We discussed that even these types of events are real events that are out of her control, but are a manifestation of problems like anxiety, depression, and PTSD. At this point, her events were captured on EEG several years ago, and they do appear to be a little different from what occurred in the past. They have features that would be suggestive of nonepileptic psychogenic spells. Given her prior diagnosis, I suspect her events may still be nonepileptic psychogenic spells, however it would be important to capture them on continuous video EEG in order to fully assess for the possibility of epileptic seizures. I would like to reassess any potential risk of seizures with a repeat MRI brain, especially since her last evaluation was many years ago.     -- I will have her get an MRI of her brain, seizure protocol to assess for any source of her symptoms    -- I will also have her get a routine EEG and when that is complete, will plan to arrange for EMU admission to capture and better characterize her events.     Orders:    MRI brain seizure wo contrast; Future    EEG awake or drowsy routine; Future    Epilepsy Monitoring Unit (EMU) Referral; Future

## 2025-05-27 ENCOUNTER — HOSPITAL ENCOUNTER (OUTPATIENT)
Dept: RADIOLOGY | Age: 25
Discharge: HOME | End: 2025-05-27
Attending: PSYCHIATRY & NEUROLOGY
Payer: MEDICARE

## 2025-05-27 DIAGNOSIS — F43.10 POSTTRAUMATIC STRESS DISORDER: ICD-10-CM

## 2025-05-27 DIAGNOSIS — G40.89 OTHER SEIZURES (HCC): ICD-10-CM

## 2025-05-27 DIAGNOSIS — F44.5 PSYCHOGENIC NONEPILEPTIC SEIZURE: ICD-10-CM

## 2025-05-27 DIAGNOSIS — G90.A POTS (POSTURAL ORTHOSTATIC TACHYCARDIA SYNDROME): ICD-10-CM

## 2025-05-27 PROCEDURE — 70551 MRI BRAIN STEM W/O DYE: CPT

## 2025-05-30 ENCOUNTER — TELEMEDICINE (OUTPATIENT)
Age: 25
End: 2025-05-30

## 2025-05-30 DIAGNOSIS — G90.A POTS (POSTURAL ORTHOSTATIC TACHYCARDIA SYNDROME): ICD-10-CM

## 2025-05-30 DIAGNOSIS — R50.9 LOW GRADE FEVER: ICD-10-CM

## 2025-05-30 DIAGNOSIS — R79.0 LOW MAGNESIUM LEVEL: ICD-10-CM

## 2025-05-30 DIAGNOSIS — G40.89 OTHER SEIZURES (HCC): Primary | ICD-10-CM

## 2025-05-30 RX ORDER — NORTRIPTYLINE HYDROCHLORIDE 75 MG/1
1 CAPSULE ORAL DAILY
COMMUNITY
Start: 2025-05-04

## 2025-05-30 NOTE — PROGRESS NOTES
Virtual Regular Visit  Name: Alexis Jules      : 2000      MRN: [id number]  Encounter Provider: Nahomi Hair  Encounter Date: 2025   Encounter department: [facility]  :  Assessment & Plan  Other seizures (HCC)  Patient seen virtual for follow up appointment; patient reports that she has been following with neurology- and was recently diagnosed with non-epileptic seizures; patient will undergo additional testing in 2025; patient doen not drive;       POTS (postural orthostatic tachycardia syndrome)  Pt has a history of POTs; has an appointment with cardiology in August;     Educated on s/s fo when to go to the ED;       Low grade fever  Pt reports on and off low grade fevers; denies any other symptoms; YARON normal     Pt is requesting referral to rheumatology for possible autoimmune factors      -  Orders:    Ambulatory Referral to Rheumatology; Future    Low magnesium level  Repeat labs ordered       Orders:    Comprehensive metabolic panel; Future          History of Present Illness     HPI  Alexis Jules is a 24 year old female seen virtual for follow up; patient follows with neurology; patient has an appointment with cardiology and referral placed for rheumatology appointment for autoimmune work up    F/u in 4/6 weeks     Review of Systems   Constitutional:  Negative for chills and fever.   HENT:  Negative for ear pain and sore throat.    Eyes:  Negative for pain and visual disturbance.   Respiratory:  Positive for chest tightness. Negative for cough and shortness of breath.    Cardiovascular:  Negative for chest pain and palpitations.   Gastrointestinal:  Negative for abdominal pain and vomiting.   Genitourinary:  Negative for dysuria and hematuria.   Musculoskeletal:  Negative for arthralgias and back pain.   Skin:  Negative for color change and rash.   Neurological:  Positive for dizziness. Negative for seizures and syncope.   All other systems reviewed and are  negative.      Objective   There were no vitals taken for this visit.    Physical Exam  Vitals and nursing note reviewed.   Constitutional:       General: She is not in acute distress.     Appearance: She is well-developed.   HENT:      Head: Normocephalic and atraumatic.     Eyes:      Conjunctiva/sclera: Conjunctivae normal.       Musculoskeletal:      Cervical back: Neck supple.     Neurological:      Mental Status: She is alert.     Psychiatric:         Mood and Affect: Mood normal.         Thought Content: Thought content normal.         Administrative Statements   Encounter provider Nahomi Hair    The Patient is located at Home and in the following state in which I hold an active license [place].    The patient was identified by name and date of birth. Alexis Jules was informed that this is a telemedicine visit and that the visit is being conducted through the Epic Embedded platform. She agrees to proceed..  My office door was closed. No one else was in the room.  She acknowledged consent and understanding of privacy and security of the video platform. The patient has agreed to participate and understands they can discontinue the visit at any time.    I have spent a total time of 10 minutes in caring for this patient on the day of the visit/encounter including Counseling / Coordination of care and Obtaining or reviewing history  , not including the time spent for establishing the audio/video connection.

## 2025-05-30 NOTE — PROGRESS NOTES
Name: Alexis Jules      : 2000      MRN: [id number]  Encounter Provider: Nahomi Hair  Encounter Date: 2025   Encounter department: [facility]  :  Assessment & Plan  Other seizures (HCC)      POTS (postural orthostatic tachycardia syndrome)         Low grade fever    Orders:    Ambulatory Referral to Rheumatology; Future    Low magnesium level    Orders:    Comprehensive metabolic panel; Future           History of Present Illness {?Quick Links Encounters * My Last Note * Last Note in Specialty * Snapshot * Since Last Visit * History :70634}  HPI  Review of Systems    Objective {?Quick Links Trend Vitals * Enter New Vitals * Results Review * Timeline (Adult) * Labs * Imaging * Cardiology * Procedures * Lung Cancer Screening * Surgical eConsent :75824}  There were no vitals taken for this visit.     Physical Exam  {Administrative / Billing Section (Optional):97766}

## 2025-06-02 ENCOUNTER — RESULTS FOLLOW-UP (OUTPATIENT)
Dept: GASTROENTEROLOGY | Facility: CLINIC | Age: 25
End: 2025-06-02

## 2025-06-13 ENCOUNTER — APPOINTMENT (OUTPATIENT)
Dept: LAB | Facility: CLINIC | Age: 25
End: 2025-06-13
Payer: MEDICARE

## 2025-06-13 DIAGNOSIS — R79.89 ELEVATED LFTS: ICD-10-CM

## 2025-06-13 DIAGNOSIS — R79.0 LOW MAGNESIUM LEVEL: ICD-10-CM

## 2025-06-13 LAB
ALBUMIN SERPL BCG-MCNC: 4.3 G/DL (ref 3.5–5)
ALP SERPL-CCNC: 94 U/L (ref 34–104)
ALT SERPL W P-5'-P-CCNC: 30 U/L (ref 7–52)
ANION GAP SERPL CALCULATED.3IONS-SCNC: 10 MMOL/L (ref 4–13)
AST SERPL W P-5'-P-CCNC: 22 U/L (ref 13–39)
BILIRUB DIRECT SERPL-MCNC: 0.04 MG/DL (ref 0–0.2)
BILIRUB SERPL-MCNC: 0.39 MG/DL (ref 0.2–1)
BUN SERPL-MCNC: 10 MG/DL (ref 5–25)
CALCIUM SERPL-MCNC: 9.3 MG/DL (ref 8.4–10.2)
CHLORIDE SERPL-SCNC: 104 MMOL/L (ref 96–108)
CO2 SERPL-SCNC: 25 MMOL/L (ref 21–32)
CREAT SERPL-MCNC: 0.76 MG/DL (ref 0.6–1.3)
GFR SERPL CREATININE-BSD FRML MDRD: 110 ML/MIN/1.73SQ M
GLUCOSE P FAST SERPL-MCNC: 84 MG/DL (ref 65–99)
MAGNESIUM SERPL-MCNC: 1.8 MG/DL (ref 1.9–2.7)
POTASSIUM SERPL-SCNC: 3.7 MMOL/L (ref 3.5–5.3)
PROT SERPL-MCNC: 7.2 G/DL (ref 6.4–8.4)
SODIUM SERPL-SCNC: 139 MMOL/L (ref 135–147)

## 2025-06-13 PROCEDURE — 82248 BILIRUBIN DIRECT: CPT

## 2025-06-13 PROCEDURE — 36415 COLL VENOUS BLD VENIPUNCTURE: CPT

## 2025-06-13 PROCEDURE — 83735 ASSAY OF MAGNESIUM: CPT

## 2025-06-13 PROCEDURE — 80053 COMPREHEN METABOLIC PANEL: CPT

## 2025-06-14 ENCOUNTER — RESULTS FOLLOW-UP (OUTPATIENT)
Age: 25
End: 2025-06-14

## 2025-06-23 ENCOUNTER — PATIENT MESSAGE (OUTPATIENT)
Age: 25
End: 2025-06-23

## 2025-06-24 ENCOUNTER — OFFICE VISIT (OUTPATIENT)
Age: 25
End: 2025-06-24
Payer: MEDICARE

## 2025-06-24 ENCOUNTER — TELEPHONE (OUTPATIENT)
Age: 25
End: 2025-06-24

## 2025-06-24 VITALS
TEMPERATURE: 98.1 F | WEIGHT: 230 LBS | HEIGHT: 66 IN | BODY MASS INDEX: 36.96 KG/M2 | HEART RATE: 117 BPM | SYSTOLIC BLOOD PRESSURE: 132 MMHG | DIASTOLIC BLOOD PRESSURE: 84 MMHG | OXYGEN SATURATION: 97 % | RESPIRATION RATE: 18 BRPM

## 2025-06-24 DIAGNOSIS — N23 URINARY TRACT PAIN: ICD-10-CM

## 2025-06-24 DIAGNOSIS — J06.9 ACUTE URI: ICD-10-CM

## 2025-06-24 DIAGNOSIS — N30.01 ACUTE CYSTITIS WITH HEMATURIA: Primary | ICD-10-CM

## 2025-06-24 LAB
SL AMB  POCT GLUCOSE, UA: NEGATIVE
SL AMB LEUKOCYTE ESTERASE,UA: ABNORMAL
SL AMB POCT BILIRUBIN,UA: NEGATIVE
SL AMB POCT BLOOD,UA: ABNORMAL
SL AMB POCT CLARITY,UA: ABNORMAL
SL AMB POCT COLOR,UA: ABNORMAL
SL AMB POCT KETONES,UA: NEGATIVE
SL AMB POCT NITRITE,UA: POSITIVE
SL AMB POCT PH,UA: 7
SL AMB POCT SPECIFIC GRAVITY,UA: 1.01
SL AMB POCT URINE PROTEIN: ABNORMAL
SL AMB POCT UROBILINOGEN: 0.2

## 2025-06-24 PROCEDURE — 87086 URINE CULTURE/COLONY COUNT: CPT

## 2025-06-24 PROCEDURE — 81002 URINALYSIS NONAUTO W/O SCOPE: CPT

## 2025-06-24 PROCEDURE — 99213 OFFICE O/P EST LOW 20 MIN: CPT

## 2025-06-24 RX ORDER — ONDANSETRON 4 MG/1
4 TABLET, ORALLY DISINTEGRATING ORAL EVERY 6 HOURS PRN
Qty: 60 TABLET | Refills: 0 | Status: SHIPPED | OUTPATIENT
Start: 2025-06-24 | End: 2025-07-09

## 2025-06-24 RX ORDER — CEPHALEXIN 500 MG/1
500 CAPSULE ORAL EVERY 12 HOURS SCHEDULED
Qty: 14 CAPSULE | Refills: 0 | Status: SHIPPED | OUTPATIENT
Start: 2025-06-24 | End: 2025-07-01

## 2025-06-24 NOTE — PATIENT COMMUNICATION
Called Homar and discussed making an appt. At [place]. Patient is requesting a telehealth visit and I told her we would need to do an in office appt. So a urine specimen can be collected. Homar stated that she doesn't want to take any antibiotics for a possible UTI or anything for the sinus symptoms. I told the patient that if she wants to avoid antibiotics she should try and drink a lot of water and drink some cranberry juice. As far as the sinus symptoms just like Carmen advised a netipot, Flonase and an allergy med. Patient is going on vacation next week so she really should reconsider making an appt. Homar stated she will go to our care now later today. The patient did say she has multiple things going on in her life and just feels very stressed out and overwhelmed right now.

## 2025-06-24 NOTE — PROGRESS NOTES
[facility]        NAME: Alexis Jules is a 25 y.o. female  : 2000    MRN: [id number]  DATE: 2025  TIME: 2:49 PM    Assessment and Plan   Acute cystitis with hematuria [N30.01]  1. Acute cystitis with hematuria  cephalexin (KEFLEX) 500 mg capsule    ondansetron (ZOFRAN-ODT) 4 mg disintegrating tablet      2. Urinary tract pain  POCT urine dip    Urine culture    Urine culture      3. Acute URI          Urine dip done in office today, moderate leukocytes, will send for culture. Antibiotics sent to the pharmacy. Zofran prescribed as patient gets nauseous from antibiotics. Discussed supportive care for URI symptoms. Mucinex, Flonase recommended. Hydration, humidifier, rest. Follow up with PCP in 3-5 days if no improvement. Proceed to ER if symptoms worsen.    Medical Decision Making     PROBLEM: 1 acute, uncomplicated illness or injury    DATA: Note(s) Reviewed: yes, chart review    RISK: Prescription drug management    TIME: 20 min     Chief Complaint     Chief Complaint   Patient presents with    Cold Like Symptoms     For one week sinus pressure, ear congestion and fullness, lymph nodes feel full and chest congestion and cough. + fever. OTC mucinex, cough meds.    Possible UTI     Started yesterday burning with urination, abdominal pressure, feeling hot/cold. Took azo otc     History of Present Illness     Alexis Jules is a 25 y.o. female presenting to the office today complaining of URI symptoms.   Symptoms have been present for 7 days, and include sinus pressure, ear congestion, swollen glands, chest congestion, cough, fever.   She has tried mucinex, cough medicine for her symptoms, minimal relief.  Patient also reports a possible UTI - burning with urination, abdominal pressure. She notes she tried AZO with minimal relief.   Review of Systems     Review of Systems   Constitutional:  Positive for chills and fever (tmax 100.5F).   HENT:  Positive for congestion, ear pain, sinus pressure and  "sinus pain.    Gastrointestinal:  Positive for nausea (resolved). Negative for vomiting.   Genitourinary:  Positive for difficulty urinating, dysuria and frequency. Negative for hematuria and urgency.   Musculoskeletal: Negative.    Skin: Negative.    Neurological: Negative.      Current Medications     Current Medications[1]    Current Allergies     Allergies as of 06/24/2025    (No Known Allergies)            The following portions of the patient's history were reviewed and updated as appropriate: allergies, current medications, past family history, past medical history, past social history, past surgical history and problem list.     Past Medical History[2]    Past Surgical History[3]    Family History[4]    Medications have been verified.    Objective     /84   Pulse (!) 117   Temp 98.1 °F (36.7 °C) (Oral)   Resp 18   Ht 5' 6\" (1.676 m)   Wt 104 kg (230 lb)   SpO2 97%   BMI 37.12 kg/m²   No LMP recorded. Patient has had a hysterectomy.     Physical Exam     Physical Exam  Constitutional:       General: She is not in acute distress.     Appearance: Normal appearance. She is normal weight. She is not ill-appearing, toxic-appearing or diaphoretic.   HENT:      Head: Normocephalic and atraumatic.      Right Ear: Tympanic membrane, ear canal and external ear normal. There is no impacted cerumen.      Left Ear: Tympanic membrane, ear canal and external ear normal. There is no impacted cerumen.      Nose: Congestion and rhinorrhea present.      Mouth/Throat:      Mouth: Mucous membranes are moist.      Pharynx: Posterior oropharyngeal erythema present. No oropharyngeal exudate.     Eyes:      General: No scleral icterus.        Right eye: No discharge.         Left eye: No discharge.      Conjunctiva/sclera: Conjunctivae normal.       Cardiovascular:      Rate and Rhythm: Normal rate and regular rhythm.      Pulses: Normal pulses.      Heart sounds: Normal heart sounds. No murmur heard.     No friction " rub. No gallop.   Pulmonary:      Effort: Pulmonary effort is normal. No respiratory distress.      Breath sounds: Normal breath sounds. No stridor. No wheezing, rhonchi or rales.   Chest:      Chest wall: No tenderness.   Abdominal:      General: There is no distension.      Palpations: There is no mass.      Tenderness: There is no abdominal tenderness. There is no right CVA tenderness, left CVA tenderness, guarding or rebound.      Hernia: No hernia is present.     Musculoskeletal:         General: Normal range of motion.      Cervical back: Normal range of motion and neck supple. No rigidity or tenderness.   Lymphadenopathy:      Cervical: No cervical adenopathy.     Skin:     General: Skin is warm and dry.      Capillary Refill: Capillary refill takes less than 2 seconds.      Coloration: Skin is not jaundiced.      Findings: No bruising or rash.     Neurological:      General: No focal deficit present.      Mental Status: She is alert. Mental status is at baseline.     Psychiatric:         Mood and Affect: Mood normal.         Behavior: Behavior normal.              [1]   Current Outpatient Medications:     cephalexin (KEFLEX) 500 mg capsule, Take 1 capsule (500 mg total) by mouth every 12 (twelve) hours for 7 days, Disp: 14 capsule, Rfl: 0    dicyclomine (BENTYL) 10 mg capsule, take 1 capsule by mouth four times a day before meals and at bedtime, Disp: 360 capsule, Rfl: 1    docusate sodium (COLACE) 100 mg capsule, Take 1 capsule (100 mg total) by mouth 2 (two) times a day, Disp: 60 capsule, Rfl: 0    magnesium Oxide (MAG-OX) 400 mg TABS, Take 1 tablet (400 mg total) by mouth 2 (two) times a day, Disp: 60 tablet, Rfl: 0    nortriptyline (PAMELOR) 50 mg capsule, Take 50 mg by mouth in the morning and 50 mg in the evening., Disp: , Rfl:     nortriptyline (PAMELOR) 75 MG capsule, Take 1 capsule by mouth in the morning, Disp: , Rfl:     omeprazole (PriLOSEC) 40 MG capsule, Take 1 capsule (40 mg total) by mouth  daily before breakfast, Disp: 30 capsule, Rfl: 4    ondansetron (ZOFRAN-ODT) 4 mg disintegrating tablet, Take 1 tablet (4 mg total) by mouth every 6 (six) hours as needed for nausea or vomiting for up to 15 days, Disp: 60 tablet, Rfl: 0    Ubrelvy 100 MG tablet, Take 100 mg by mouth daily as needed, Disp: , Rfl:   [2]   Past Medical History:  Diagnosis Date    Endometriosis     Fibroids     Ovarian cyst     Seizures (HCC)     Non- epileptic    Tourette's    [3]   Past Surgical History:  Procedure Laterality Date    CHOLECYSTECTOMY  2022    CYSTOSCOPY N/A 1/3/2025    Procedure: CYSTOSCOPY;  Surgeon: Pallavi Yun MD;  Location: [facility];  Service: Gynecology    ENDOMETRIAL FULGURATION      GA LAPS TOTAL HYSTERECT 250 GM/< W/RMVL TUBE/OVARY N/A 1/3/2025    Procedure: TOTAL LAPAROSCOPIC HYSTERECTOMY, BILATERAL SALPINGECTOMY, EXAM UNDER ANESTHESIA;  Surgeon: Pallavi Yun MD;  Location: [facility];  Service: Gynecology    UPPER GASTROINTESTINAL ENDOSCOPY      WISDOM TOOTH EXTRACTION     [4]   Family History  Problem Relation Name Age of Onset    Thyroid disease Mother      Heart block Mother      Diabetes Mother      Heart block Father      Heart attack Father      Cancer Maternal Grandmother      Multiple sclerosis Paternal Grandmother      Cirrhosis Maternal Uncle      Cancer Paternal Uncle      Seizures Cousin

## 2025-06-24 NOTE — PATIENT INSTRUCTIONS
"Patient Education     Urinary tract infection - Discharge instructions   The Basics   Written by the doctors and editors at [facility]   What are discharge instructions? -- Discharge instructions are information about how to take care of yourself after getting medical care for a health problem.  What is a urinary tract infection? -- A urinary tract infection (\"UTI\") is an infection that affects either the bladder or the kidneys (figure 1). A kidney infection is more serious, and can lead to other serious problems if it is not treated properly.  You need to take antibiotics to treat a UTI. It is important to take all of your antibiotics, even if you start to feel better.  How do I care for myself at home? -- Ask the doctor or nurse what you should do when you go home. Make sure that you understand exactly what you need to do to care for yourself. Ask questions if there is anything you do not understand.  You should also:   Take all of your medicines as instructed.   Take phenazopyridine (sample brand name: AZO Urinary Pain Relief) for the first day or so, if you choose. This is an over-the-counter medicine. It will help numb your bladder and decrease the urge to urinate. This medicine causes your urine and tears to look orange.   Take acetaminophen (sample brand name: Tylenol) if needed for pain.   Drink extra fluids. This can help prevent more bladder infections. If you have sex, these things might also help:   Urinate right afterward.   If you use birth control, use a form that does not contain spermicide.  When should I call the doctor? -- Call for advice if:   You have pain in your back, shoulder, or belly.   You have a fever, shaking chills, or sweats even though you are taking antibiotics.   You notice more blood in your urine.   Your symptoms get worse or do not get better within 24 hours of starting antibiotics.   Your symptoms come back after finishing treatment.   You have any new or worrying symptoms.  All " topics are updated as new evidence becomes available and our peer review process is complete.  This topic retrieved from [employer] on: Feb 26, 2024.  Topic 142031 Version 1.0  Release: 32.2.4 - C32.56  © 2024 UpToDate, Inc. and/or its affiliates. All rights reserved.  figure 1: Anatomy of the urinary tract     Urine is made by the kidneys. It passes from the kidneys into the bladder through 2 tubes called the ureters. Then, it leaves the bladder through another tube called the urethra.  Graphic 03847 Version 8.0  Consumer Information Use and Disclaimer   Disclaimer: This generalized information is a limited summary of diagnosis, treatment, and/or medication information. It is not meant to be comprehensive and should be used as a tool to help the user understand and/or assess potential diagnostic and treatment options. It does NOT include all information about conditions, treatments, medications, side effects, or risks that may apply to a specific patient. It is not intended to be medical advice or a substitute for the medical advice, diagnosis, or treatment of a health care provider based on the health care provider's examination and assessment of a patient's specific and unique circumstances. Patients must speak with a health care provider for complete information about their health, medical questions, and treatment options, including any risks or benefits regarding use of medications. This information does not endorse any treatments or medications as safe, effective, or approved for treating a specific patient. UpToDate, Inc. and its affiliates disclaim any warranty or liability relating to this information or the use thereof.The use of this information is governed by the Terms of Use, available at https://www.wolterskluwer.com/en/know/clinical-effectiveness-terms. 2024© UpToDate, Inc. and its affiliates and/or licensors. All rights reserved.  Copyright   © 2024 UpToDate, Inc. and/or its affiliates. All rights  reserved.

## 2025-06-25 LAB — BACTERIA UR CULT: NORMAL

## 2025-07-16 ENCOUNTER — TELEMEDICINE (OUTPATIENT)
Age: 25
End: 2025-07-16
Payer: MEDICARE

## 2025-07-16 DIAGNOSIS — R35.0 URINARY FREQUENCY: Primary | ICD-10-CM

## 2025-07-16 PROCEDURE — 99214 OFFICE O/P EST MOD 30 MIN: CPT

## 2025-07-16 PROCEDURE — 87086 URINE CULTURE/COLONY COUNT: CPT

## 2025-07-16 PROCEDURE — 81002 URINALYSIS NONAUTO W/O SCOPE: CPT

## 2025-07-16 RX ORDER — NITROFURANTOIN 25; 75 MG/1; MG/1
100 CAPSULE ORAL 2 TIMES DAILY
Qty: 10 CAPSULE | Refills: 0 | Status: SHIPPED | OUTPATIENT
Start: 2025-07-16 | End: 2025-07-24

## 2025-07-16 NOTE — PROGRESS NOTES
Virtual Regular Visit  Name: Alexis Jules      : 2000      MRN: [id number]  Encounter Provider: Nahomi Hair  Encounter Date: 2025   Encounter department: [facility] PRIMARY CARE  :  Assessment & Plan  Urinary frequency  Patient seen virtual for UTI symptoms that started over the weekend; reports burning and increase frequency; denies back pain or blood in urine ; pt reports that she did a at home urine dip which was positive     Educated that patient needs to come to the office for urine sample and culture- patient is agreeable     Patient states that she had a UTI about 2 weeks ago    Patient reports low grade fevers which have been chronic for patient since recent illness- patient states that she does not know if it is related to the UTI    Educated on s/s of when to go to the ED or follow up sooner- patient verbalized understanding     2025 at 1219  POCT- mod leuko and large blood   Urine culture sent     Orders:  •  POCT urine dip  •  Urine culture; Future  •  nitrofurantoin (MACROBID) 100 mg capsule; Take 1 capsule (100 mg total) by mouth 2 (two) times a day for 5 days        History of Present Illness     HPI  Alexis Jules is a 25 year old female seen virtual for UTI symptoms that started over the weekend; reports burning and increase frequency; denies back pain or blood in urine     Educated that patient needs to come to the office for urine sample and culture- patient is agreeable     Sent in antibiotics to pharmacy- pt is agreeable that no antibiotic use of AZO use before urine sample; patient states that she will come by the end of the day    Patient states that she had a UTI about 2 weeks ago     Review of Systems   Constitutional:  Negative for chills and fever.   HENT:  Negative for ear pain and sore throat.    Eyes:  Negative for pain and visual disturbance.   Respiratory:  Negative for cough and shortness of breath.    Cardiovascular:  Negative for chest pain and  palpitations.   Gastrointestinal:  Negative for abdominal pain and vomiting.   Genitourinary:  Positive for dysuria and frequency. Negative for flank pain and hematuria.   Musculoskeletal:  Negative for arthralgias and back pain.   Skin:  Negative for color change and rash.   Neurological:  Negative for seizures and syncope.   All other systems reviewed and are negative.      Objective   There were no vitals taken for this visit.    Physical Exam  Constitutional:       Appearance: She is not ill-appearing or toxic-appearing.   HENT:      Head: Normocephalic and atraumatic.     Neurological:      Mental Status: Mental status is at baseline.      Motor: No weakness.       Administrative Statements   Encounter provider Nahomi Hair    The Patient is located at Home and in the following state in which I hold an active license [place].    The patient was identified by name and date of birth. Alexis Jules was informed that this is a telemedicine visit and that the visit is being conducted through the Epic Embedded platform. She agrees to proceed..  My office door was closed. No one else was in the room.  She acknowledged consent and understanding of privacy and security of the video platform. The patient has agreed to participate and understands they can discontinue the visit at any time.    I have spent a total time of 15 minutes in caring for this patient on the day of the visit/encounter including Instructions for management and Patient and family education, not including the time spent for establishing the audio/video connection.

## 2025-07-16 NOTE — TELEPHONE ENCOUNTER
Chart reviewed: auth obtained, [id number] confirmed.    Confirmation call placed to patient to remind of admission date, time and location. No answer. Requested call back to office if any questions or needing to reschedule.

## 2025-07-17 ENCOUNTER — CONSULT (OUTPATIENT)
Age: 25
End: 2025-07-17
Payer: MEDICARE

## 2025-07-17 VITALS
OXYGEN SATURATION: 97 % | TEMPERATURE: 98.8 F | HEART RATE: 104 BPM | WEIGHT: 233.4 LBS | SYSTOLIC BLOOD PRESSURE: 130 MMHG | HEIGHT: 66 IN | BODY MASS INDEX: 37.51 KG/M2 | DIASTOLIC BLOOD PRESSURE: 92 MMHG

## 2025-07-17 DIAGNOSIS — M25.50 POLYARTHRALGIA: Primary | ICD-10-CM

## 2025-07-17 DIAGNOSIS — Q79.60 EHLERS-DANLOS SYNDROME: ICD-10-CM

## 2025-07-17 DIAGNOSIS — E55.9 VITAMIN D DEFICIENCY: ICD-10-CM

## 2025-07-17 DIAGNOSIS — R50.9 LOW GRADE FEVER: ICD-10-CM

## 2025-07-17 DIAGNOSIS — R53.83 OTHER FATIGUE: ICD-10-CM

## 2025-07-17 LAB — BACTERIA UR CULT: NORMAL

## 2025-07-17 PROCEDURE — 99244 OFF/OP CNSLTJ NEW/EST MOD 40: CPT | Performed by: PHYSICIAN ASSISTANT
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
## 2025-07-17 NOTE — ASSESSMENT & PLAN NOTE
As noted above she does have hypermobility noted on exam today.  We did discuss genetic testing however this is not something that can be done by me.  If she is interested in pursuing this can give her the information for other institutions that do genetic testing regarding EDS.  Would also recommend establishing care with a physical therapist specializing in hypermobility.  I provided information for the Racheal-Danlos Society including their website to look for other providers who specializes in EDS for symptom management and treatment.

## 2025-07-17 NOTE — PATIENT INSTRUCTIONS
Magnesium malate (300 mg daily)  Racheal Danlos Society ([email or web address]  [facility]'s lab ([facility], [address] [place] [address], [address], [place], [place])  POTS Specialist  Hafsa Prabhakar MD  [facility]

## 2025-07-21 ENCOUNTER — APPOINTMENT (INPATIENT)
Dept: NEUROLOGY | Facility: CLINIC | Age: 25
End: 2025-07-21
Attending: PHYSICIAN ASSISTANT
Payer: MEDICARE

## 2025-07-21 ENCOUNTER — HOSPITAL ENCOUNTER (INPATIENT)
Facility: HOSPITAL | Age: 25
LOS: 3 days | Discharge: HOME | End: 2025-07-24
Attending: STUDENT IN AN ORGANIZED HEALTH CARE EDUCATION/TRAINING PROGRAM | Admitting: STUDENT IN AN ORGANIZED HEALTH CARE EDUCATION/TRAINING PROGRAM
Payer: MEDICARE

## 2025-07-21 DIAGNOSIS — G90.A POTS (POSTURAL ORTHOSTATIC TACHYCARDIA SYNDROME): ICD-10-CM

## 2025-07-21 DIAGNOSIS — Q79.60 EHLERS-DANLOS SYNDROME: ICD-10-CM

## 2025-07-21 DIAGNOSIS — F44.5 PSYCHOGENIC NONEPILEPTIC SEIZURE: ICD-10-CM

## 2025-07-21 DIAGNOSIS — R11.0 NAUSEA: ICD-10-CM

## 2025-07-21 DIAGNOSIS — G40.89 OTHER SEIZURES (HCC): Primary | ICD-10-CM

## 2025-07-21 LAB
ATRIAL RATE: 65 BPM
P AXIS: 107 DEGREES
PR INTERVAL: 142 MS
QRS AXIS: 93 DEGREES
QRSD INTERVAL: 86 MS
QT INTERVAL: 408 MS
QTC INTERVAL: 425 MS
T WAVE AXIS: 114 DEGREES
VENTRICULAR RATE: 65 BPM

## 2025-07-21 PROCEDURE — 95700 EEG CONT REC W/VID EEG TECH: CPT

## 2025-07-21 PROCEDURE — 93005 ELECTROCARDIOGRAM TRACING: CPT

## 2025-07-21 PROCEDURE — 99223 1ST HOSP IP/OBS HIGH 75: CPT | Performed by: STUDENT IN AN ORGANIZED HEALTH CARE EDUCATION/TRAINING PROGRAM

## 2025-07-21 PROCEDURE — 95715 VEEG EA 12-26HR INTMT MNTR: CPT

## 2025-07-21 PROCEDURE — 4A10X4Z MONITORING OF CENTRAL NERVOUS ELECTRICAL ACTIVITY, EXTERNAL APPROACH: ICD-10-PCS | Performed by: PHYSICIAN ASSISTANT

## 2025-07-21 PROCEDURE — 93010 ELECTROCARDIOGRAM REPORT: CPT | Performed by: INTERNAL MEDICINE

## 2025-07-21 RX ORDER — DIPHENHYDRAMINE HCL 25 MG
25 TABLET ORAL EVERY 6 HOURS PRN
Status: DISCONTINUED | OUTPATIENT
Start: 2025-07-21 | End: 2025-07-24 | Stop reason: HOSPADM

## 2025-07-21 RX ORDER — ACETAMINOPHEN 325 MG/1
650 TABLET ORAL EVERY 6 HOURS PRN
Status: DISCONTINUED | OUTPATIENT
Start: 2025-07-21 | End: 2025-07-24 | Stop reason: HOSPADM

## 2025-07-21 RX ORDER — LORAZEPAM 2 MG/ML
1 INJECTION INTRAMUSCULAR EVERY 6 HOURS PRN
Status: DISCONTINUED | OUTPATIENT
Start: 2025-07-21 | End: 2025-07-21

## 2025-07-21 RX ORDER — DOCUSATE SODIUM 100 MG/1
100 CAPSULE, LIQUID FILLED ORAL 2 TIMES DAILY
Status: DISCONTINUED | OUTPATIENT
Start: 2025-07-21 | End: 2025-07-24 | Stop reason: HOSPADM

## 2025-07-21 RX ORDER — NORTRIPTYLINE HYDROCHLORIDE 50 MG/1
50 CAPSULE ORAL 2 TIMES DAILY
Status: DISCONTINUED | OUTPATIENT
Start: 2025-07-21 | End: 2025-07-24 | Stop reason: HOSPADM

## 2025-07-21 RX ORDER — DICYCLOMINE HYDROCHLORIDE 10 MG/1
20 CAPSULE ORAL 2 TIMES DAILY
Status: DISCONTINUED | OUTPATIENT
Start: 2025-07-21 | End: 2025-07-24 | Stop reason: HOSPADM

## 2025-07-21 RX ORDER — PANTOPRAZOLE SODIUM 40 MG/1
40 TABLET, DELAYED RELEASE ORAL EVERY MORNING
Status: DISCONTINUED | OUTPATIENT
Start: 2025-07-22 | End: 2025-07-24 | Stop reason: HOSPADM

## 2025-07-21 RX ORDER — LORAZEPAM 2 MG/ML
1 INJECTION INTRAMUSCULAR EVERY 8 HOURS PRN
Status: DISCONTINUED | OUTPATIENT
Start: 2025-07-21 | End: 2025-07-24 | Stop reason: HOSPADM

## 2025-07-21 RX ORDER — LORAZEPAM 2 MG/ML
2 INJECTION INTRAMUSCULAR EVERY 8 HOURS PRN
Status: DISCONTINUED | OUTPATIENT
Start: 2025-07-21 | End: 2025-07-21

## 2025-07-21 RX ORDER — ENOXAPARIN SODIUM 100 MG/ML
40 INJECTION SUBCUTANEOUS DAILY
Status: DISCONTINUED | OUTPATIENT
Start: 2025-07-21 | End: 2025-07-24 | Stop reason: HOSPADM

## 2025-07-21 RX ORDER — ONDANSETRON 4 MG/1
4 TABLET, ORALLY DISINTEGRATING ORAL EVERY 6 HOURS PRN
Status: DISCONTINUED | OUTPATIENT
Start: 2025-07-21 | End: 2025-07-24 | Stop reason: HOSPADM

## 2025-07-21 RX ADMIN — ENOXAPARIN SODIUM 40 MG: 40 INJECTION SUBCUTANEOUS at 13:41

## 2025-07-21 RX ADMIN — DOCUSATE SODIUM 100 MG: 100 CAPSULE, LIQUID FILLED ORAL at 20:04

## 2025-07-21 RX ADMIN — RIMEGEPANT SULFATE 75 MG: 75 TABLET, ORALLY DISINTEGRATING ORAL at 20:04

## 2025-07-21 RX ADMIN — NORTRIPTYLINE HYDROCHLORIDE 50 MG: 50 CAPSULE ORAL at 21:37

## 2025-07-21 RX ADMIN — DICYCLOMINE HYDROCHLORIDE 20 MG: 10 CAPSULE ORAL at 20:04
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
## 2025-07-21 NOTE — ASSESSMENT & PLAN NOTE
Patient currently being evaluated for multiple GI symptoms inlcuding diarrhea, constipation, abdominal pain, reflux symptoms.  GI following in outpatient setting.  She is currently taking Colace 100mg BID, Bentyl 20mg BID, omeprazole 40mg daily (substituting with pantoprazole 40mg while admitted), and using ondansetron 4mg as needed for nausea.

## 2025-07-21 NOTE — ASSESSMENT & PLAN NOTE
Patient under care of psychiatry and psychology for anxiety, depression, PTSD.  She is taking nortriptyline 50mg BID for anxiety, will continue while admitted.

## 2025-07-21 NOTE — PLAN OF CARE
Problem: PAIN - ADULT  Goal: Verbalizes/displays adequate comfort level or baseline comfort level  Description: Interventions:  - Encourage patient to monitor pain and request assistance  - Assess pain using appropriate pain scale  - Administer analgesics as ordered based on type and severity of pain and evaluate response  - Implement non-pharmacological measures as appropriate and evaluate response  - Consider cultural and social influences on pain and pain management  - Notify physician/advanced practitioner if interventions unsuccessful or patient reports new pain  - Educate patient/family on pain management process including their role and importance of  reporting pain   - Provide non-pharmacologic/complimentary pain relief interventions  Outcome: Progressing     Problem: INFECTION - ADULT  Goal: Absence or prevention of progression during hospitalization  Description: INTERVENTIONS:  - Assess and monitor for signs and symptoms of infection  - Monitor lab/diagnostic results  - Monitor all insertion sites, i.e. indwelling lines, tubes, and drains  - Monitor endotracheal if appropriate and nasal secretions for changes in amount and color  - Cassandra appropriate cooling/warming therapies per order  - Administer medications as ordered  - Instruct and encourage patient and family to use good hand hygiene technique  - Identify and instruct in appropriate isolation precautions for identified infection/condition  Outcome: Progressing  Goal: Absence of fever/infection during neutropenic period  Description: INTERVENTIONS:  - Monitor WBC  - Perform strict hand hygiene  - Limit to healthy visitors only  - No plants, dried, fresh or silk flowers with wilde in patient room  Outcome: Progressing     Problem: SAFETY ADULT  Goal: Patient will remain free of falls  Description: INTERVENTIONS:  - Educate patient/family on patient safety including physical limitations  - Instruct patient to call for assistance with activity   -  Consider consulting OT/PT to assist with strengthening/mobility based on AM PAC & JH-HLM score  - Consult OT/PT to assist with strengthening/mobility   - Keep Call bell within reach  - Keep bed low and locked with side rails adjusted as appropriate  - Keep care items and personal belongings within reach  - Initiate and maintain comfort rounds  - Make Fall Risk Sign visible to staff  - Offer Toileting every 2 Hours, in advance of need  - Initiate/Maintain bed alarm  - Apply yellow socks and bracelet for high fall risk patients  - Consider moving patient to room near nurses station  Outcome: Progressing  Goal: Maintain or return to baseline ADL function  Description: INTERVENTIONS:  -  Assess patient's ability to carry out ADLs; assess patient's baseline for ADL function and identify physical deficits which impact ability to perform ADLs (bathing, care of mouth/teeth, toileting, grooming, dressing, etc.)  - Assess/evaluate cause of self-care deficits   - Assess range of motion  - Assess patient's mobility; develop plan if impaired  - Assess patient's need for assistive devices and provide as appropriate  - Encourage maximum independence but intervene and supervise when necessary  - Involve family in performance of ADLs  - Assess for home care needs following discharge   - Consider OT consult to assist with ADL evaluation and planning for discharge  - Provide patient education as appropriate  - Monitor functional capacity and physical performance, use of AM PAC & JH-HLM   - Monitor gait, balance and fatigue with ambulation    Outcome: Progressing  Goal: Maintains/Returns to pre admission functional level  Description: INTERVENTIONS:  - Perform AM-PAC 6 Click Basic Mobility/ Daily Activity assessment daily.  - Set and communicate daily mobility goal to care team and patient/family/caregiver.   - Collaborate with rehabilitation services on mobility goals if consulted  - Perform Range of Motion 3 times a day.  -  Reposition patient every 2 hours.  - Dangle patient 3 times a day  - Stand patient 2 times a day  - Ambulate patient 3 times a day  - Out of bed to chair 2 times a day   - Out of bed for meals 3 times a day  - Out of bed for toileting  - Record patient progress and toleration of activity level   Outcome: Progressing     Problem: DISCHARGE PLANNING  Goal: Discharge to home or other facility with appropriate resources  Description: INTERVENTIONS:  - Identify barriers to discharge w/patient and caregiver  - Arrange for needed discharge resources and transportation as appropriate  - Identify discharge learning needs (meds, wound care, etc.)  - Arrange for interpretive services to assist at discharge as needed  - Refer to Case Management Department for coordinating discharge planning if the patient needs post-hospital services based on physician/advanced practitioner order or complex needs related to functional status, cognitive ability, or social support system  Outcome: Progressing     Problem: Knowledge Deficit  Goal: Patient/family/caregiver demonstrates understanding of disease process, treatment plan, medications, and discharge instructions  Description: Complete learning assessment and assess knowledge base.  Interventions:  - Provide teaching at level of understanding  - Provide teaching via preferred learning methods  Outcome: Progressing
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
## 2025-07-21 NOTE — ASSESSMENT & PLAN NOTE
Patient reports vocal and motor tics since childhood.  Seeing a therapist.  Previously on benzos for this but admits to abusing benzos, therefore not taking anymore.  Tics typically increase with stress, or when discussing the tics themselves (observed during interview today).

## 2025-07-21 NOTE — ASSESSMENT & PLAN NOTE
Patient with intermittent migraines, typically uses Ubrelvy for abortive therapy.  Ubrelvy not on formulary, will substitute with Nurtec while admitted.     She currently has a left sided HA.  No significant improvement with Tylenol this AM.  Will give Nurtec.  Using heat pack/ice may also be helpful for posterior HA.

## 2025-07-22 ENCOUNTER — APPOINTMENT (INPATIENT)
Dept: NEUROLOGY | Facility: CLINIC | Age: 25
End: 2025-07-22
Attending: PHYSICIAN ASSISTANT
Payer: MEDICARE

## 2025-07-22 PROCEDURE — 95720 EEG PHY/QHP EA INCR W/VEEG: CPT | Performed by: STUDENT IN AN ORGANIZED HEALTH CARE EDUCATION/TRAINING PROGRAM

## 2025-07-22 PROCEDURE — 95715 VEEG EA 12-26HR INTMT MNTR: CPT

## 2025-07-22 PROCEDURE — 99233 SBSQ HOSP IP/OBS HIGH 50: CPT | Performed by: STUDENT IN AN ORGANIZED HEALTH CARE EDUCATION/TRAINING PROGRAM

## 2025-07-22 RX ADMIN — DICYCLOMINE HYDROCHLORIDE 20 MG: 10 CAPSULE ORAL at 20:17

## 2025-07-22 RX ADMIN — NORTRIPTYLINE HYDROCHLORIDE 50 MG: 50 CAPSULE ORAL at 21:31

## 2025-07-22 RX ADMIN — ACETAMINOPHEN 650 MG: 325 TABLET ORAL at 09:27

## 2025-07-22 RX ADMIN — PANTOPRAZOLE SODIUM 40 MG: 40 TABLET, DELAYED RELEASE ORAL at 09:23

## 2025-07-22 RX ADMIN — ENOXAPARIN SODIUM 40 MG: 40 INJECTION SUBCUTANEOUS at 09:23

## 2025-07-22 RX ADMIN — DOCUSATE SODIUM 100 MG: 100 CAPSULE, LIQUID FILLED ORAL at 20:17

## 2025-07-22 RX ADMIN — RIMEGEPANT SULFATE 75 MG: 75 TABLET, ORALLY DISINTEGRATING ORAL at 12:21

## 2025-07-22 RX ADMIN — DICYCLOMINE HYDROCHLORIDE 20 MG: 10 CAPSULE ORAL at 09:23

## 2025-07-22 RX ADMIN — DOCUSATE SODIUM 100 MG: 100 CAPSULE, LIQUID FILLED ORAL at 09:23

## 2025-07-22 RX ADMIN — NORTRIPTYLINE HYDROCHLORIDE 50 MG: 50 CAPSULE ORAL at 09:31

## 2025-07-22 NOTE — PLAN OF CARE
Problem: PAIN - ADULT  Goal: Verbalizes/displays adequate comfort level or baseline comfort level  Description: Interventions:  - Encourage patient to monitor pain and request assistance  - Assess pain using appropriate pain scale  - Administer analgesics as ordered based on type and severity of pain and evaluate response  - Implement non-pharmacological measures as appropriate and evaluate response  - Consider cultural and social influences on pain and pain management  - Notify physician/advanced practitioner if interventions unsuccessful or patient reports new pain  - Educate patient/family on pain management process including their role and importance of  reporting pain   - Provide non-pharmacologic/complimentary pain relief interventions  Outcome: Progressing     Problem: INFECTION - ADULT  Goal: Absence or prevention of progression during hospitalization  Description: INTERVENTIONS:  - Assess and monitor for signs and symptoms of infection  - Monitor lab/diagnostic results  - Monitor all insertion sites, i.e. indwelling lines, tubes, and drains  - Monitor endotracheal if appropriate and nasal secretions for changes in amount and color  - [place] appropriate cooling/warming therapies per order  - Administer medications as ordered  - Instruct and encourage patient and family to use good hand hygiene technique  - Identify and instruct in appropriate isolation precautions for identified infection/condition  Outcome: Progressing  Goal: Absence of fever/infection during neutropenic period  Description: INTERVENTIONS:  - Monitor WBC  - Perform strict hand hygiene  - Limit to healthy visitors only  - No plants, dried, fresh or silk flowers with wilde in patient room  Outcome: Progressing     Problem: SAFETY ADULT  Goal: Patient will remain free of falls  Description: INTERVENTIONS:  - Educate patient/family on patient safety including physical limitations  - Instruct patient to call for assistance with activity   -  Consider consulting OT/PT to assist with strengthening/mobility based on AM PAC & JH-HLM score  - Consult OT/PT to assist with strengthening/mobility   - Keep Call bell within reach  - Keep bed low and locked with side rails adjusted as appropriate  - Keep care items and personal belongings within reach  - Initiate and maintain comfort rounds  - Make Fall Risk Sign visible to staff  - Offer Toileting every 2 Hours, in advance of need  - Initiate/Maintain bed alarm  - Apply yellow socks and bracelet for high fall risk patients  - Consider moving patient to room near nurses station  Outcome: Progressing  Goal: Maintain or return to baseline ADL function  Description: INTERVENTIONS:  -  Assess patient's ability to carry out ADLs; assess patient's baseline for ADL function and identify physical deficits which impact ability to perform ADLs (bathing, care of mouth/teeth, toileting, grooming, dressing, etc.)  - Assess/evaluate cause of self-care deficits   - Assess range of motion  - Assess patient's mobility; develop plan if impaired  - Assess patient's need for assistive devices and provide as appropriate  - Encourage maximum independence but intervene and supervise when necessary  - Involve family in performance of ADLs  - Assess for home care needs following discharge   - Consider OT consult to assist with ADL evaluation and planning for discharge  - Provide patient education as appropriate  - Monitor functional capacity and physical performance, use of AM PAC & JH-HLM   - Monitor gait, balance and fatigue with ambulation    Outcome: Progressing  Goal: Maintains/Returns to pre admission functional level  Description: INTERVENTIONS:  - Perform AM-PAC 6 Click Basic Mobility/ Daily Activity assessment daily.  - Set and communicate daily mobility goal to care team and patient/family/caregiver.   - Collaborate with rehabilitation services on mobility goals if consulted  - Perform Range of Motion 3 times a day.  -  Reposition patient every 2 hours.  - Dangle patient 3 times a day  - Stand patient 2 times a day  - Ambulate patient 3 times a day  - Out of bed to chair 2 times a day   - Out of bed for meals 3 times a day  - Out of bed for toileting  - Record patient progress and toleration of activity level   Outcome: Progressing     Problem: DISCHARGE PLANNING  Goal: Discharge to home or other facility with appropriate resources  Description: INTERVENTIONS:  - Identify barriers to discharge w/patient and caregiver  - Arrange for needed discharge resources and transportation as appropriate  - Identify discharge learning needs (meds, wound care, etc.)  - Arrange for interpretive services to assist at discharge as needed  - Refer to Case Management Department for coordinating discharge planning if the patient needs post-hospital services based on physician/advanced practitioner order or complex needs related to functional status, cognitive ability, or social support system  Outcome: Progressing     Problem: Knowledge Deficit  Goal: Patient/family/caregiver demonstrates understanding of disease process, treatment plan, medications, and discharge instructions  Description: Complete learning assessment and assess knowledge base.  Interventions:  - Provide teaching at level of understanding  - Provide teaching via preferred learning methods  Outcome: Progressing

## 2025-07-22 NOTE — UTILIZATION REVIEW
NOTIFICATION OF INPATIENT ADMISSION   AUTHORIZATION REQUEST   SERVICING FACILITY:   [facility]  Address: [address] [place] [address]  Tax ID: [id number]  NPI: [id number] ATTENDING PROVIDER:  Attending Name and NPI#: Erasmo Fraire Md [[id number]]  Address: [address] [place] [address]  Phone: [phone]   ADMISSION INFORMATION:  Place of Service: Inpatient [facility]  Place of Service Code: 21  Inpatient Admission Date/Time: 7/21/25  8:41 AM  Discharge Date/Time: No discharge date for patient encounter.  Admitting Diagnosis Code/Description:  Psychogenic nonepileptic seizure [F44.5]     UTILIZATION REVIEW CONTACT:  [place] Utilization   Network Utilization Review Department  Phone: [phone]  Fax: [phone]  Email: [email or web address]  Contact for approvals/pending authorizations, clinical reviews, and discharge.     PHYSICIAN ADVISORY SERVICES:  Medical Necessity Denial & Twnw-nb-Esul Review  Phone: [phone]  Fax: [phone]  Email: [email or web address]     DISCHARGE SUPPORT TEAM:  For Patients Discharge Needs & Updates  Phone: [phone] opt. 2 Fax: [phone]  Email: [email or web address]

## 2025-07-22 NOTE — PLAN OF CARE
Problem: PAIN - ADULT  Goal: Verbalizes/displays adequate comfort level or baseline comfort level  Description: Interventions:  - Encourage patient to monitor pain and request assistance  - Assess pain using appropriate pain scale  - Administer analgesics as ordered based on type and severity of pain and evaluate response  - Implement non-pharmacological measures as appropriate and evaluate response  - Consider cultural and social influences on pain and pain management  - Notify physician/advanced practitioner if interventions unsuccessful or patient reports new pain  - Educate patient/family on pain management process including their role and importance of  reporting pain   - Provide non-pharmacologic/complimentary pain relief interventions  Outcome: Progressing     Problem: SAFETY ADULT  Goal: Patient will remain free of falls  Description: INTERVENTIONS:  - Educate patient/family on patient safety including physical limitations  - Instruct patient to call for assistance with activity   - Consider consulting OT/PT to assist with strengthening/mobility based on AM PAC & JH-HLM score  - Consult OT/PT to assist with strengthening/mobility   - Keep Call bell within reach  - Keep bed low and locked with side rails adjusted as appropriate  - Keep care items and personal belongings within reach  - Initiate and maintain comfort rounds  - Make Fall Risk Sign visible to staff  - Offer Toileting every 2 Hours, in advance of need  - Initiate/Maintain bed alarm  - Apply yellow socks and bracelet for high fall risk patients  - Consider moving patient to room near nurses station  Outcome: Progressing  Goal: Maintain or return to baseline ADL function  Description: INTERVENTIONS:  -  Assess patient's ability to carry out ADLs; assess patient's baseline for ADL function and identify physical deficits which impact ability to perform ADLs (bathing, care of mouth/teeth, toileting, grooming, dressing, etc.)  - Assess/evaluate cause  of self-care deficits   - Assess range of motion  - Assess patient's mobility; develop plan if impaired  - Assess patient's need for assistive devices and provide as appropriate  - Encourage maximum independence but intervene and supervise when necessary  - Involve family in performance of ADLs  - Assess for home care needs following discharge   - Consider OT consult to assist with ADL evaluation and planning for discharge  - Provide patient education as appropriate  - Monitor functional capacity and physical performance, use of AM PAC & JH-HLM   - Monitor gait, balance and fatigue with ambulation    Outcome: Progressing  Goal: Maintains/Returns to pre admission functional level  Description: INTERVENTIONS:  - Perform AM-PAC 6 Click Basic Mobility/ Daily Activity assessment daily.  - Set and communicate daily mobility goal to care team and patient/family/caregiver.   - Collaborate with rehabilitation services on mobility goals if consulted  - Perform Range of Motion 3 times a day.  - Reposition patient every 2 hours.  - Dangle patient 3 times a day  - Stand patient 2 times a day  - Ambulate patient 3 times a day  - Out of bed to chair 2 times a day   - Out of bed for meals 3 times a day  - Out of bed for toileting  - Record patient progress and toleration of activity level   Outcome: Progressing     Problem: Knowledge Deficit  Goal: Patient/family/caregiver demonstrates understanding of disease process, treatment plan, medications, and discharge instructions  Description: Complete learning assessment and assess knowledge base.  Interventions:  - Provide teaching at level of understanding  - Provide teaching via preferred learning methods  Outcome: Progressing
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
## 2025-07-23 ENCOUNTER — APPOINTMENT (INPATIENT)
Dept: NEUROLOGY | Facility: CLINIC | Age: 25
End: 2025-07-23
Attending: CLINIC/CENTER
Payer: MEDICARE

## 2025-07-23 PROCEDURE — 99233 SBSQ HOSP IP/OBS HIGH 50: CPT | Performed by: STUDENT IN AN ORGANIZED HEALTH CARE EDUCATION/TRAINING PROGRAM

## 2025-07-23 PROCEDURE — 95720 EEG PHY/QHP EA INCR W/VEEG: CPT | Performed by: STUDENT IN AN ORGANIZED HEALTH CARE EDUCATION/TRAINING PROGRAM

## 2025-07-23 PROCEDURE — 95715 VEEG EA 12-26HR INTMT MNTR: CPT

## 2025-07-23 RX ORDER — SENNOSIDES 8.6 MG
1 TABLET ORAL
Status: DISCONTINUED | OUTPATIENT
Start: 2025-07-23 | End: 2025-07-24 | Stop reason: HOSPADM

## 2025-07-23 RX ORDER — DOCUSATE SODIUM 100 MG/1
100 CAPSULE, LIQUID FILLED ORAL 2 TIMES DAILY PRN
Status: DISCONTINUED | OUTPATIENT
Start: 2025-07-23 | End: 2025-07-23

## 2025-07-23 RX ADMIN — DICYCLOMINE HYDROCHLORIDE 20 MG: 10 CAPSULE ORAL at 21:05

## 2025-07-23 RX ADMIN — DOCUSATE SODIUM 100 MG: 100 CAPSULE, LIQUID FILLED ORAL at 08:30

## 2025-07-23 RX ADMIN — DOCUSATE SODIUM 100 MG: 100 CAPSULE, LIQUID FILLED ORAL at 21:05

## 2025-07-23 RX ADMIN — DICYCLOMINE HYDROCHLORIDE 20 MG: 10 CAPSULE ORAL at 08:30

## 2025-07-23 RX ADMIN — NORTRIPTYLINE HYDROCHLORIDE 50 MG: 50 CAPSULE ORAL at 08:35

## 2025-07-23 RX ADMIN — NORTRIPTYLINE HYDROCHLORIDE 50 MG: 50 CAPSULE ORAL at 21:05

## 2025-07-23 RX ADMIN — SENNOSIDES 8.6 MG: 8.6 TABLET, FILM COATED ORAL at 21:05

## 2025-07-23 RX ADMIN — SENNOSIDES 8.6 MG: 8.6 TABLET, FILM COATED ORAL at 11:31

## 2025-07-23 RX ADMIN — ENOXAPARIN SODIUM 40 MG: 40 INJECTION SUBCUTANEOUS at 08:30

## 2025-07-23 RX ADMIN — PANTOPRAZOLE SODIUM 40 MG: 40 TABLET, DELAYED RELEASE ORAL at 08:30
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
## 2025-07-23 NOTE — PLAN OF CARE
Problem: PAIN - ADULT  Goal: Verbalizes/displays adequate comfort level or baseline comfort level  Description: Interventions:  - Encourage patient to monitor pain and request assistance  - Assess pain using appropriate pain scale  - Administer analgesics as ordered based on type and severity of pain and evaluate response  - Implement non-pharmacological measures as appropriate and evaluate response  - Consider cultural and social influences on pain and pain management  - Notify physician/advanced practitioner if interventions unsuccessful or patient reports new pain  - Educate patient/family on pain management process including their role and importance of  reporting pain   - Provide non-pharmacologic/complimentary pain relief interventions  Outcome: Progressing     Problem: INFECTION - ADULT  Goal: Absence or prevention of progression during hospitalization  Description: INTERVENTIONS:  - Assess and monitor for signs and symptoms of infection  - Monitor lab/diagnostic results  - Monitor all insertion sites, i.e. indwelling lines, tubes, and drains  - Monitor endotracheal if appropriate and nasal secretions for changes in amount and color  - [place] appropriate cooling/warming therapies per order  - Administer medications as ordered  - Instruct and encourage patient and family to use good hand hygiene technique  - Identify and instruct in appropriate isolation precautions for identified infection/condition  Outcome: Progressing  Goal: Absence of fever/infection during neutropenic period  Description: INTERVENTIONS:  - Monitor WBC  - Perform strict hand hygiene  - Limit to healthy visitors only  - No plants, dried, fresh or silk flowers with wilde in patient room  Outcome: Progressing     Problem: SAFETY ADULT  Goal: Patient will remain free of falls  Description: INTERVENTIONS:  - Educate patient/family on patient safety including physical limitations  - Instruct patient to call for assistance with activity   -  Consider consulting OT/PT to assist with strengthening/mobility based on AM PAC & JH-HLM score  - Consult OT/PT to assist with strengthening/mobility   - Keep Call bell within reach  - Keep bed low and locked with side rails adjusted as appropriate  - Keep care items and personal belongings within reach  - Initiate and maintain comfort rounds  - Make Fall Risk Sign visible to staff  - Offer Toileting every 2 Hours, in advance of need  - Initiate/Maintain bed alarm  - Apply yellow socks and bracelet for high fall risk patients  - Consider moving patient to room near nurses station  Outcome: Progressing  Goal: Maintain or return to baseline ADL function  Description: INTERVENTIONS:  -  Assess patient's ability to carry out ADLs; assess patient's baseline for ADL function and identify physical deficits which impact ability to perform ADLs (bathing, care of mouth/teeth, toileting, grooming, dressing, etc.)  - Assess/evaluate cause of self-care deficits   - Assess range of motion  - Assess patient's mobility; develop plan if impaired  - Assess patient's need for assistive devices and provide as appropriate  - Encourage maximum independence but intervene and supervise when necessary  - Involve family in performance of ADLs  - Assess for home care needs following discharge   - Consider OT consult to assist with ADL evaluation and planning for discharge  - Provide patient education as appropriate  - Monitor functional capacity and physical performance, use of AM PAC & JH-HLM   - Monitor gait, balance and fatigue with ambulation    Outcome: Progressing  Goal: Maintains/Returns to pre admission functional level  Description: INTERVENTIONS:  - Perform AM-PAC 6 Click Basic Mobility/ Daily Activity assessment daily.  - Set and communicate daily mobility goal to care team and patient/family/caregiver.   - Collaborate with rehabilitation services on mobility goals if consulted  - Perform Range of Motion 3 times a day.  -  Reposition patient every 2 hours.  - Dangle patient 3 times a day  - Stand patient 2 times a day  - Ambulate patient 3 times a day  - Out of bed to chair 2 times a day   - Out of bed for meals 3 times a day  - Out of bed for toileting  - Record patient progress and toleration of activity level   Outcome: Progressing     Problem: DISCHARGE PLANNING  Goal: Discharge to home or other facility with appropriate resources  Description: INTERVENTIONS:  - Identify barriers to discharge w/patient and caregiver  - Arrange for needed discharge resources and transportation as appropriate  - Identify discharge learning needs (meds, wound care, etc.)  - Arrange for interpretive services to assist at discharge as needed  - Refer to Case Management Department for coordinating discharge planning if the patient needs post-hospital services based on physician/advanced practitioner order or complex needs related to functional status, cognitive ability, or social support system  Outcome: Progressing     Problem: Knowledge Deficit  Goal: Patient/family/caregiver demonstrates understanding of disease process, treatment plan, medications, and discharge instructions  Description: Complete learning assessment and assess knowledge base.  Interventions:  - Provide teaching at level of understanding  - Provide teaching via preferred learning methods  Outcome: Progressing
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
## 2025-07-24 VITALS
DIASTOLIC BLOOD PRESSURE: 95 MMHG | HEART RATE: 98 BPM | RESPIRATION RATE: 18 BRPM | WEIGHT: 230 LBS | SYSTOLIC BLOOD PRESSURE: 123 MMHG | HEIGHT: 67 IN | OXYGEN SATURATION: 96 % | BODY MASS INDEX: 36.1 KG/M2 | TEMPERATURE: 98.1 F

## 2025-07-24 PROCEDURE — 95720 EEG PHY/QHP EA INCR W/VEEG: CPT | Performed by: STUDENT IN AN ORGANIZED HEALTH CARE EDUCATION/TRAINING PROGRAM

## 2025-07-24 PROCEDURE — 99239 HOSP IP/OBS DSCHRG MGMT >30: CPT | Performed by: STUDENT IN AN ORGANIZED HEALTH CARE EDUCATION/TRAINING PROGRAM

## 2025-07-24 RX ADMIN — NORTRIPTYLINE HYDROCHLORIDE 50 MG: 50 CAPSULE ORAL at 08:33

## 2025-07-24 RX ADMIN — DICYCLOMINE HYDROCHLORIDE 20 MG: 10 CAPSULE ORAL at 08:33

## 2025-07-24 RX ADMIN — PANTOPRAZOLE SODIUM 40 MG: 40 TABLET, DELAYED RELEASE ORAL at 08:33

## 2025-07-24 NOTE — PLAN OF CARE
Problem: PAIN - ADULT  Goal: Verbalizes/displays adequate comfort level or baseline comfort level  Description: Interventions:  - Encourage patient to monitor pain and request assistance  - Assess pain using appropriate pain scale  - Administer analgesics as ordered based on type and severity of pain and evaluate response  - Implement non-pharmacological measures as appropriate and evaluate response  - Consider cultural and social influences on pain and pain management  - Notify physician/advanced practitioner if interventions unsuccessful or patient reports new pain  - Educate patient/family on pain management process including their role and importance of  reporting pain   - Provide non-pharmacologic/complimentary pain relief interventions  Outcome: Progressing     Problem: SAFETY ADULT  Goal: Patient will remain free of falls  Description: INTERVENTIONS:  - Educate patient/family on patient safety including physical limitations  - Instruct patient to call for assistance with activity   - Consider consulting OT/PT to assist with strengthening/mobility based on AM PAC & JH-HLM score  - Consult OT/PT to assist with strengthening/mobility   - Keep Call bell within reach  - Keep bed low and locked with side rails adjusted as appropriate  - Keep care items and personal belongings within reach  - Initiate and maintain comfort rounds  - Make Fall Risk Sign visible to staff  - Offer Toileting every 2 Hours, in advance of need  - Initiate/Maintain bed alarm  - Apply yellow socks and bracelet for high fall risk patients  - Consider moving patient to room near nurses station  Outcome: Progressing  Goal: Maintain or return to baseline ADL function  Description: INTERVENTIONS:  -  Assess patient's ability to carry out ADLs; assess patient's baseline for ADL function and identify physical deficits which impact ability to perform ADLs (bathing, care of mouth/teeth, toileting, grooming, dressing, etc.)  - Assess/evaluate cause  of self-care deficits   - Assess range of motion  - Assess patient's mobility; develop plan if impaired  - Assess patient's need for assistive devices and provide as appropriate  - Encourage maximum independence but intervene and supervise when necessary  - Involve family in performance of ADLs  - Assess for home care needs following discharge   - Consider OT consult to assist with ADL evaluation and planning for discharge  - Provide patient education as appropriate  - Monitor functional capacity and physical performance, use of AM PAC & JH-HLM   - Monitor gait, balance and fatigue with ambulation    Outcome: Progressing  Goal: Maintains/Returns to pre admission functional level  Description: INTERVENTIONS:  - Perform AM-PAC 6 Click Basic Mobility/ Daily Activity assessment daily.  - Set and communicate daily mobility goal to care team and patient/family/caregiver.   - Collaborate with rehabilitation services on mobility goals if consulted  - Perform Range of Motion 3 times a day.  - Reposition patient every 2 hours.  - Dangle patient 3 times a day  - Stand patient 2 times a day  - Ambulate patient 3 times a day  - Out of bed to chair 2 times a day   - Out of bed for meals 3 times a day  - Out of bed for toileting  - Record patient progress and toleration of activity level   Outcome: Progressing     Problem: DISCHARGE PLANNING  Goal: Discharge to home or other facility with appropriate resources  Description: INTERVENTIONS:  - Identify barriers to discharge w/patient and caregiver  - Arrange for needed discharge resources and transportation as appropriate  - Identify discharge learning needs (meds, wound care, etc.)  - Arrange for interpretive services to assist at discharge as needed  - Refer to Case Management Department for coordinating discharge planning if the patient needs post-hospital services based on physician/advanced practitioner order or complex needs related to functional status, cognitive ability, or  social support system  Outcome: Progressing

## 2025-07-24 NOTE — DISCHARGE SUMMARY
Discharge Summary - Neurology   Name: Alexis Jules 25 y.o. female I MRN: [id number]  Unit/Bed#: [facility] 714-01 I Date of Admission: 7/21/2025   Date of Service: 7/24/2025 I Hospital Day: 3      Epilepsy Monitoring Unit Discharge Summary    Patient Name: Alexis Jules   YOB: 2000   MRN: [id number]   Date of Admission: 7/21/2025   Date of Discharge:  7/24/2025   Referring Physician: Dr. Barrow  Attending on Admission: Erasmo Fraire MD  Attending on Discharge: Erasmo Fraire MD     Discharge Diagnoses:  1. Other seizures (HCC)    2. Racheal-Danlos syndrome    3. Psychogenic nonepileptic seizure    4. POTS (postural orthostatic tachycardia syndrome)    5. Nausea        Procedures:  Continuous Video EEG  Telemetry    Disposition:  Discharge to Home    Admission Medications:  Prior to Admission Medications   Prescriptions Last Dose Informant Patient Reported? Taking?   Ubrelvy 100 MG tablet Past Week Self Yes Yes   Sig: Take 100 mg by mouth daily as needed   dicyclomine (BENTYL) 10 mg capsule 7/21/2025 Morning  No Yes   Sig: take 1 capsule by mouth four times a day before meals and at bedtime   docusate sodium (COLACE) 100 mg capsule Past Month  No Yes   Sig: Take 1 capsule (100 mg total) by mouth 2 (two) times a day   magnesium Oxide (MAG-OX) 400 mg TABS Past Month  No Yes   Sig: Take 1 tablet (400 mg total) by mouth 2 (two) times a day   nitrofurantoin (MACROBID) 100 mg capsule 7/21/2025 Morning  No Yes   Sig: Take 1 capsule (100 mg total) by mouth 2 (two) times a day for 5 days   nortriptyline (PAMELOR) 50 mg capsule 7/21/2025 Morning Self Yes Yes   Sig: Take 50 mg by mouth in the morning and 50 mg in the evening.   omeprazole (PriLOSEC) 40 MG capsule 7/21/2025 Morning  No Yes   Sig: Take 1 capsule (40 mg total) by mouth daily before breakfast   ondansetron (ZOFRAN-ODT) 4 mg disintegrating tablet Past Week  No Yes   Sig: Take 1 tablet (4 mg total) by mouth every 6 (six) hours as needed for nausea or  "vomiting for up to 15 days      Facility-Administered Medications: None       Discharge Medications:  See after visit summary for reconciled discharge medications provided to patient and family.      Follow-up Appointments/Referrals:  Follow-up with [facility] as scheduled with Dr. Dr. Barrow    Recommended follow-up testing:    Diet: Regular  Activity: As tolerated  Restrictions: Same as prior to admission    Indication for Admission:  Spell capture    Hospital Course:  The patient was admitted to the EMU and monitored with continuous video EEG. Despite 3 days of vEEG with provoking measures like HV, PS and sleep deprivation, no typical events were captured. Additionally, her interictal EEG was normal. During the hospitalization she felt a left sided headache and just overall \"seiz-y\".  Being that her current episodes are so largely associated with sexual intercourse, the plan is to set up a 24 hour amb EEG and have the patient engage in intimacy to capture a typical event. Additionally, patient reports heart rhythm issues and generalized malaise and unwellness. We ordered a tilt table test to evaluate for POTS.    The patient was hemodynamically stable at the time of discharge. All questions were answered. She will follow up with Dr. Barrow in the future.    Activation procedures:  Sleep deprivation  Photic stimulation  Hyperventilation    Physical Exam   /95   Pulse 98   Temp 98.1 °F (36.7 °C)   Resp 18   Ht 5' 7\" (1.702 m)   Wt 104 kg (230 lb)   SpO2 96%   BMI 36.02 kg/m²   Heart:  RRR, no murmer  Lungs:  CTAB     Neurologic Exam  Mental Status:  A & 0 x 3  Language: fluent, comprehension intact  Cranial Nerves:  PERRL. EOMI no nystagums. Face symmetric. Tongue/palate midline.  Motor:  No drift. Normal bulk and tone. Strength 5/5  DTRs:  2+ throughout.  Coordination: Finger to nose intact    The total amount of time spent with the patient was 32 minutes. More than 50% of this " time was devoted to counseling and coordination of care. Issues addressed included overall management, diagnostic work up, and communication with patient regarding results and follow up plan.       Erasmo Fraire MD   [facility]  [place], [place]

## 2025-07-24 NOTE — DISCHARGE INSTR - AVS FIRST PAGE
CONCLUSION  During your admission to the Epilepsy monitoring unit, we were not able to catch any of your clinical events.     Following your admission to the epilepsy monitoring unit, your medications were not changed.  There is a plan to complete a take home EEG with provoking measures as well as complete a tilt table test.    -------------------------------------------------------------------------------------------------------------------    For more information about Non-epileptic psychogenic spells, you can visit:  [email or web address] or [email or web address]    -------------------------------------------------------------------------------------------------------------------  FOLLOW UP  You will be following up with your Neurologist at [facility]. If you have any problems with your medications, please call (available during the day Monday through Friday):  [phone]      -------------------------------------------------------------------------------------------------------------------    SEIZURE/SPELL PRECAUTIONS  It is [place] State Law that you not drive for the next 6 months after you have a seizure, episode of loss of consciousness or episode of confusion.  Please do not take baths, swim in open water, climb heights, or operate heavy machinery. These activities put you, and possibly others, at risk if you were to have a seizure or episode of loss of consciousness.  To decrease your chance of having more seizures or spells please remember to take your medications every day, get plenty of sleep, and abstain from alcohol and drugs    -------------------------------------------------------------------------------------------------------------------

## 2025-07-24 NOTE — PLAN OF CARE
Problem: PAIN - ADULT  Goal: Verbalizes/displays adequate comfort level or baseline comfort level  Description: Interventions:  - Encourage patient to monitor pain and request assistance  - Assess pain using appropriate pain scale  - Administer analgesics as ordered based on type and severity of pain and evaluate response  - Implement non-pharmacological measures as appropriate and evaluate response  - Consider cultural and social influences on pain and pain management  - Notify physician/advanced practitioner if interventions unsuccessful or patient reports new pain  - Educate patient/family on pain management process including their role and importance of  reporting pain   - Provide non-pharmacologic/complimentary pain relief interventions  Outcome: Progressing     Problem: INFECTION - ADULT  Goal: Absence or prevention of progression during hospitalization  Description: INTERVENTIONS:  - Assess and monitor for signs and symptoms of infection  - Monitor lab/diagnostic results  - Monitor all insertion sites, i.e. indwelling lines, tubes, and drains  - Monitor endotracheal if appropriate and nasal secretions for changes in amount and color  - [place] appropriate cooling/warming therapies per order  - Administer medications as ordered  - Instruct and encourage patient and family to use good hand hygiene technique  - Identify and instruct in appropriate isolation precautions for identified infection/condition  Outcome: Progressing  Goal: Absence of fever/infection during neutropenic period  Description: INTERVENTIONS:  - Monitor WBC  - Perform strict hand hygiene  - Limit to healthy visitors only  - No plants, dried, fresh or silk flowers with wilde in patient room  Outcome: Progressing     Problem: SAFETY ADULT  Goal: Patient will remain free of falls  Description: INTERVENTIONS:  - Educate patient/family on patient safety including physical limitations  - Instruct patient to call for assistance with activity   -  Consider consulting OT/PT to assist with strengthening/mobility based on AM PAC & JH-HLM score  - Consult OT/PT to assist with strengthening/mobility   - Keep Call bell within reach  - Keep bed low and locked with side rails adjusted as appropriate  - Keep care items and personal belongings within reach  - Initiate and maintain comfort rounds  - Make Fall Risk Sign visible to staff  - Offer Toileting every 2 Hours, in advance of need  - Initiate/Maintain bed alarm  - Apply yellow socks and bracelet for high fall risk patients  - Consider moving patient to room near nurses station  Outcome: Progressing  Goal: Maintain or return to baseline ADL function  Description: INTERVENTIONS:  -  Assess patient's ability to carry out ADLs; assess patient's baseline for ADL function and identify physical deficits which impact ability to perform ADLs (bathing, care of mouth/teeth, toileting, grooming, dressing, etc.)  - Assess/evaluate cause of self-care deficits   - Assess range of motion  - Assess patient's mobility; develop plan if impaired  - Assess patient's need for assistive devices and provide as appropriate  - Encourage maximum independence but intervene and supervise when necessary  - Involve family in performance of ADLs  - Assess for home care needs following discharge   - Consider OT consult to assist with ADL evaluation and planning for discharge  - Provide patient education as appropriate  - Monitor functional capacity and physical performance, use of AM PAC & JH-HLM   - Monitor gait, balance and fatigue with ambulation    Outcome: Progressing  Goal: Maintains/Returns to pre admission functional level  Description: INTERVENTIONS:  - Perform AM-PAC 6 Click Basic Mobility/ Daily Activity assessment daily.  - Set and communicate daily mobility goal to care team and patient/family/caregiver.   - Collaborate with rehabilitation services on mobility goals if consulted  - Perform Range of Motion 3 times a day.  -  Reposition patient every 2 hours.  - Dangle patient 3 times a day  - Stand patient 2 times a day  - Ambulate patient 3 times a day  - Out of bed to chair 2 times a day   - Out of bed for meals 3 times a day  - Out of bed for toileting  - Record patient progress and toleration of activity level   Outcome: Progressing     Problem: DISCHARGE PLANNING  Goal: Discharge to home or other facility with appropriate resources  Description: INTERVENTIONS:  - Identify barriers to discharge w/patient and caregiver  - Arrange for needed discharge resources and transportation as appropriate  - Identify discharge learning needs (meds, wound care, etc.)  - Arrange for interpretive services to assist at discharge as needed  - Refer to Case Management Department for coordinating discharge planning if the patient needs post-hospital services based on physician/advanced practitioner order or complex needs related to functional status, cognitive ability, or social support system  Outcome: Progressing     Problem: Knowledge Deficit  Goal: Patient/family/caregiver demonstrates understanding of disease process, treatment plan, medications, and discharge instructions  Description: Complete learning assessment and assess knowledge base.  Interventions:  - Provide teaching at level of understanding  - Provide teaching via preferred learning methods  Outcome: Progressing

## 2025-07-25 NOTE — UTILIZATION REVIEW
NOTIFICATION OF ADMISSION DISCHARGE   This is a Notification of Discharge from [facility]. Please be advised that this patient has been discharge from our facility. Below you will find the admission and discharge date and time including the patient’s disposition.   UTILIZATION REVIEW CONTACT:  Utilization Review Assistants  Network Utilization Review Department  Phone: [phone] x carefully listen to the prompts. All voicemails are confidential.  Email: [email or web address]     ADMISSION INFORMATION  PRESENTATION DATE: 7/21/2025  8:41 AM  OBERVATION ADMISSION DATE: N/A  INPATIENT ADMISSION DATE: 7/21/25  8:41 AM   DISCHARGE DATE: 7/24/2025 10:42 AM   DISPOSITION:Home/Self Care    Network Utilization Review Department  ATTENTION: Please call with any questions or concerns to [phone] and carefully listen to the prompts so that you are directed to the right person. All voicemails are confidential.   For Discharge needs, contact Care Management DC Support Team at [phone] opt. 2  Send all requests for admission clinical reviews, approved or denied determinations and any other requests to dedicated fax number below belonging to the campus where the patient is receiving treatment. List of dedicated fax numbers for the Facilities:  FACILITY NAME UR FAX NUMBER   ADMISSION DENIALS (Administrative/Medical Necessity) [phone]   DISCHARGE SUPPORT TEAM ([facility]) [phone]   PARENT CHILD HEALTH (Maternity/NICU/Pediatrics) [phone]   [facility] [phone]   [facility] [phone]   [facility] [phone]   [facility] [phone]   [facility] [phone]   [facility] [phone]   [facility] [phone]   [facility] [phone]   [facility] [phone]   [facility] [phone]   [facility] [phone]   [facility] [phone]

## 2025-07-28 ENCOUNTER — TELEPHONE (OUTPATIENT)
Age: 25
End: 2025-07-28

## 2025-07-28 ENCOUNTER — PATIENT OUTREACH (OUTPATIENT)
Dept: CASE MANAGEMENT | Facility: OTHER | Age: 25
End: 2025-07-28

## 2025-08-05 ENCOUNTER — TELEPHONE (OUTPATIENT)
Age: 25
End: 2025-08-05

## 2025-08-05 ENCOUNTER — TELEMEDICINE (OUTPATIENT)
Dept: GASTROENTEROLOGY | Facility: CLINIC | Age: 25
End: 2025-08-05
Payer: MEDICARE

## 2025-08-05 VITALS — HEIGHT: 66 IN | WEIGHT: 228 LBS | BODY MASS INDEX: 36.64 KG/M2

## 2025-08-05 DIAGNOSIS — K58.1 IRRITABLE BOWEL SYNDROME WITH CONSTIPATION: ICD-10-CM

## 2025-08-05 DIAGNOSIS — R19.8 IRREGULAR BOWEL HABITS: ICD-10-CM

## 2025-08-05 DIAGNOSIS — R11.0 NAUSEA: Primary | ICD-10-CM

## 2025-08-05 DIAGNOSIS — D12.6 TUBULAR ADENOMA OF COLON: ICD-10-CM

## 2025-08-05 DIAGNOSIS — R10.84 GENERALIZED ABDOMINAL PAIN: ICD-10-CM

## 2025-08-05 PROCEDURE — 99214 OFFICE O/P EST MOD 30 MIN: CPT | Performed by: PHYSICIAN ASSISTANT

## 2025-08-05 RX ORDER — ONDANSETRON 4 MG/1
1 TABLET, FILM COATED ORAL EVERY 24 HOURS
COMMUNITY
End: 2025-08-05 | Stop reason: SDUPTHER

## 2025-08-05 RX ORDER — LINACLOTIDE 72 UG/1
72 CAPSULE, GELATIN COATED ORAL
Qty: 30 CAPSULE | Refills: 4 | Status: SHIPPED | OUTPATIENT
Start: 2025-08-05 | End: 2025-09-04

## 2025-08-07 ENCOUNTER — PATIENT MESSAGE (OUTPATIENT)
Dept: NEUROLOGY | Facility: CLINIC | Age: 25
End: 2025-08-07

## 2025-08-07 ENCOUNTER — APPOINTMENT (OUTPATIENT)
Age: 25
End: 2025-08-07
Payer: MEDICARE

## 2025-08-07 LAB
25(OH)D3 SERPL-MCNC: 18.5 NG/ML (ref 30–100)
ALBUMIN SERPL BCG-MCNC: 4.2 G/DL (ref 3.5–5)
ALP SERPL-CCNC: 101 U/L (ref 34–104)
ALT SERPL W P-5'-P-CCNC: 48 U/L (ref 7–52)
ANION GAP SERPL CALCULATED.3IONS-SCNC: 7 MMOL/L (ref 4–13)
AST SERPL W P-5'-P-CCNC: 26 U/L (ref 13–39)
BASOPHILS # BLD AUTO: 0.05 THOUSANDS/ÂΜL (ref 0–0.1)
BASOPHILS NFR BLD AUTO: 1 % (ref 0–1)
BILIRUB SERPL-MCNC: 0.28 MG/DL (ref 0.2–1)
BUN SERPL-MCNC: 9 MG/DL (ref 5–25)
CALCIUM SERPL-MCNC: 9.4 MG/DL (ref 8.4–10.2)
CHLORIDE SERPL-SCNC: 102 MMOL/L (ref 96–108)
CK SERPL-CCNC: 53 U/L (ref 26–192)
CO2 SERPL-SCNC: 29 MMOL/L (ref 21–32)
CREAT SERPL-MCNC: 0.69 MG/DL (ref 0.6–1.3)
CRP SERPL QL: 11.5 MG/L
EOSINOPHIL # BLD AUTO: 0.21 THOUSAND/ÂΜL (ref 0–0.61)
EOSINOPHIL NFR BLD AUTO: 2 % (ref 0–6)
ERYTHROCYTE [DISTWIDTH] IN BLOOD BY AUTOMATED COUNT: 13.6 % (ref 11.6–15.1)
ERYTHROCYTE [SEDIMENTATION RATE] IN BLOOD: 33 MM/HOUR (ref 0–19)
FERRITIN SERPL-MCNC: 21 NG/ML (ref 30–307)
GFR SERPL CREATININE-BSD FRML MDRD: 122 ML/MIN/1.73SQ M
GLUCOSE SERPL-MCNC: 80 MG/DL (ref 65–140)
HCT VFR BLD AUTO: 40.9 % (ref 34.8–46.1)
HGB BLD-MCNC: 13.4 G/DL (ref 11.5–15.4)
IMM GRANULOCYTES # BLD AUTO: 0.04 THOUSAND/UL (ref 0–0.2)
IMM GRANULOCYTES NFR BLD AUTO: 0 % (ref 0–2)
IRON SATN MFR SERPL: 20 % (ref 15–50)
IRON SERPL-MCNC: 79 UG/DL (ref 50–212)
LYMPHOCYTES # BLD AUTO: 2.71 THOUSANDS/ÂΜL (ref 0.6–4.47)
LYMPHOCYTES NFR BLD AUTO: 25 % (ref 14–44)
MCH RBC QN AUTO: 27.9 PG (ref 26.8–34.3)
MCHC RBC AUTO-ENTMCNC: 32.8 G/DL (ref 31.4–37.4)
MCV RBC AUTO: 85 FL (ref 82–98)
MONOCYTES # BLD AUTO: 0.49 THOUSAND/ÂΜL (ref 0.17–1.22)
MONOCYTES NFR BLD AUTO: 5 % (ref 4–12)
NEUTROPHILS # BLD AUTO: 7.42 THOUSANDS/ÂΜL (ref 1.85–7.62)
NEUTS SEG NFR BLD AUTO: 67 % (ref 43–75)
NRBC BLD AUTO-RTO: 0 /100 WBCS
PLATELET # BLD AUTO: 319 THOUSANDS/UL (ref 149–390)
PMV BLD AUTO: 10.1 FL (ref 8.9–12.7)
POTASSIUM SERPL-SCNC: 4.4 MMOL/L (ref 3.5–5.3)
PROT SERPL-MCNC: 7.1 G/DL (ref 6.4–8.4)
RBC # BLD AUTO: 4.8 MILLION/UL (ref 3.81–5.12)
RHEUMATOID FACT SERPL-ACNC: <10 IU/ML
SODIUM SERPL-SCNC: 138 MMOL/L (ref 135–147)
TIBC SERPL-MCNC: 392 UG/DL (ref 250–450)
TRANSFERRIN SERPL-MCNC: 280 MG/DL (ref 203–362)
UIBC SERPL-MCNC: 313 UG/DL (ref 155–355)
VIT B12 SERPL-MCNC: 598 PG/ML (ref 180–914)
WBC # BLD AUTO: 10.92 THOUSAND/UL (ref 4.31–10.16)

## 2025-08-08 LAB — ANA SER QL IF: NEGATIVE

## 2025-08-08 RX ORDER — UBROGEPANT 100 MG/1
100 TABLET ORAL DAILY PRN
Status: CANCELLED | OUTPATIENT
Start: 2025-08-08

## 2025-08-10 LAB — CCP AB SER IA-ACNC: 1.4 (ref ?–10)

## 2025-08-19 ENCOUNTER — TELEPHONE (OUTPATIENT)
Age: 25
End: 2025-08-19

## 2025-08-19 DIAGNOSIS — R10.84 GENERALIZED ABDOMINAL PAIN: ICD-10-CM

## 2025-08-19 RX ORDER — DICYCLOMINE HYDROCHLORIDE 10 MG/1
10 CAPSULE ORAL
Qty: 360 CAPSULE | Refills: 2 | Status: SHIPPED | OUTPATIENT
Start: 2025-08-19

## 2025-08-21 ENCOUNTER — TELEPHONE (OUTPATIENT)
Age: 25
End: 2025-08-21

## 2025-08-21 ENCOUNTER — TELEMEDICINE (OUTPATIENT)
Age: 25
End: 2025-08-21
Payer: MEDICARE

## 2025-08-21 DIAGNOSIS — E55.9 VITAMIN D DEFICIENCY: ICD-10-CM

## 2025-08-21 DIAGNOSIS — Q79.60 EHLERS-DANLOS SYNDROME: Primary | ICD-10-CM

## 2025-08-21 DIAGNOSIS — R79.0 LOW FERRITIN: ICD-10-CM

## 2025-08-21 DIAGNOSIS — R10.84 GENERALIZED ABDOMINAL PAIN: Primary | ICD-10-CM

## 2025-08-21 DIAGNOSIS — R19.7 DIARRHEA, UNSPECIFIED TYPE: ICD-10-CM

## 2025-08-21 PROCEDURE — 99214 OFFICE O/P EST MOD 30 MIN: CPT | Performed by: PHYSICIAN ASSISTANT

## (undated) DEVICE — SUT VICRYL 0 CT-1 27 IN J260H

## (undated) DEVICE — GLOVE INDICATOR PI UNDERGLOVE SZ 7.5 BLUE

## (undated) DEVICE — 1820 FOAM BLOCK NEEDLE COUNTER: Brand: DEVON

## (undated) DEVICE — TUBING SMOKE EVAC W/FILTRATION DEVICE PLUMEPORT ACTIV

## (undated) DEVICE — TROCAR: Brand: KII FIOS FIRST ENTRY

## (undated) DEVICE — Device

## (undated) DEVICE — TUBING SUCTION 5MM X 12 FT

## (undated) DEVICE — SYRINGE 50ML LL

## (undated) DEVICE — UTERINE MANIPULATOR RUMI 5.1 X 6 CM

## (undated) DEVICE — ELECTRODE LAP SPATULA CRV E-Z CLEAN 33CM -0018C

## (undated) DEVICE — TRAY FOLEY 16FR URIMETER SILICONE SURESTEP

## (undated) DEVICE — ALL PURPOSE SPONGES,NON-WOVEN, 3 PLY: Brand: CURITY

## (undated) DEVICE — PREMIUM DRY TRAY LF: Brand: MEDLINE INDUSTRIES, INC.

## (undated) DEVICE — MEDI-VAC YANK SUCT HNDL W/TPRD BULBOUS TIP: Brand: CARDINAL HEALTH

## (undated) DEVICE — SUT VICRYL 0 UR-6 27 IN J603H

## (undated) DEVICE — CHLORHEXIDINE 4PCT 4 OZ

## (undated) DEVICE — ENSEAL X1 STRAIGHT 37CM SHAFT: Brand: HARMONIC

## (undated) DEVICE — PLASTIC ADHESIVE BANDAGE: Brand: CURITY

## (undated) DEVICE — TROCAR: Brand: KII® SLEEVE

## (undated) DEVICE — INTENDED FOR TISSUE SEPARATION, AND OTHER PROCEDURES THAT REQUIRE A SHARP SURGICAL BLADE TO PUNCTURE OR CUT.: Brand: BARD-PARKER SAFETY BLADES SIZE 10, STERILE

## (undated) DEVICE — SUT MONOCRYL 4-0 PS-2 18 IN Y496G

## (undated) DEVICE — BETHLEHEM UNIVERSAL GYN LAP PK: Brand: CARDINAL HEALTH

## (undated) DEVICE — GLOVE PI ULTRA TOUCH SZ.7.5

## (undated) DEVICE — MAYO STAND COVER: Brand: CONVERTORS

## (undated) DEVICE — PENCIL ELECTROSURG E-Z CLEAN -0035H

## (undated) DEVICE — INSUFLATION TUBING INSUFLOW (LEXION)

## (undated) DEVICE — CHLORAPREP HI-LITE 26ML ORANGE

## (undated) DEVICE — INTENDED FOR TISSUE SEPARATION, AND OTHER PROCEDURES THAT REQUIRE A SHARP SURGICAL BLADE TO PUNCTURE OR CUT.: Brand: BARD-PARKER SAFETY BLADES SIZE 11, STERILE

## (undated) DEVICE — 3M™ TEGADERM™ TRANSPARENT FILM DRESSING FRAME STYLE, 1624W, 2-3/8 IN X 2-3/4 IN (6 CM X 7 CM), 100/CT 4CT/CASE: Brand: 3M™ TEGADERM™

## (undated) DEVICE — STRL COTTON TIP APPLCTR 6IN PK: Brand: CARDINAL HEALTH

## (undated) DEVICE — CYSTO TUBING SINGLE IRRIGATION

## (undated) DEVICE — 2, DISPOSABLE SUCTION/IRRIGATOR WITHOUT DISPOSABLE TIP: Brand: STRYKEFLOW

## (undated) DEVICE — SPONGE 4 X 4 XRAY 16 PLY STRL LF RFD